# Patient Record
Sex: MALE | Race: WHITE | NOT HISPANIC OR LATINO | Employment: FULL TIME | URBAN - METROPOLITAN AREA
[De-identification: names, ages, dates, MRNs, and addresses within clinical notes are randomized per-mention and may not be internally consistent; named-entity substitution may affect disease eponyms.]

---

## 2017-03-07 ENCOUNTER — OFFICE VISIT (OUTPATIENT)
Dept: MEDICAL GROUP | Age: 57
End: 2017-03-07
Payer: COMMERCIAL

## 2017-03-07 VITALS
OXYGEN SATURATION: 95 % | DIASTOLIC BLOOD PRESSURE: 76 MMHG | WEIGHT: 209 LBS | HEIGHT: 71 IN | BODY MASS INDEX: 29.26 KG/M2 | HEART RATE: 102 BPM | TEMPERATURE: 98.4 F | SYSTOLIC BLOOD PRESSURE: 134 MMHG

## 2017-03-07 DIAGNOSIS — E03.9 HYPOTHYROIDISM, UNSPECIFIED TYPE: ICD-10-CM

## 2017-03-07 DIAGNOSIS — M25.532 LEFT WRIST PAIN: ICD-10-CM

## 2017-03-07 DIAGNOSIS — K58.0 IRRITABLE BOWEL SYNDROME WITH DIARRHEA: ICD-10-CM

## 2017-03-07 DIAGNOSIS — F51.01 PRIMARY INSOMNIA: ICD-10-CM

## 2017-03-07 DIAGNOSIS — J30.1 SEASONAL ALLERGIC RHINITIS DUE TO POLLEN: ICD-10-CM

## 2017-03-07 DIAGNOSIS — K90.0 CELIAC DISEASE: ICD-10-CM

## 2017-03-07 DIAGNOSIS — K21.9 GASTROESOPHAGEAL REFLUX DISEASE WITHOUT ESOPHAGITIS: ICD-10-CM

## 2017-03-07 PROCEDURE — 99204 OFFICE O/P NEW MOD 45 MIN: CPT | Performed by: FAMILY MEDICINE

## 2017-03-07 RX ORDER — PRAVASTATIN SODIUM 40 MG
40 TABLET ORAL NIGHTLY
COMMUNITY
End: 2017-04-03 | Stop reason: SDUPTHER

## 2017-03-07 RX ORDER — UBIDECARENONE 75 MG
100 CAPSULE ORAL DAILY
COMMUNITY
End: 2021-03-23

## 2017-03-07 RX ORDER — FLUTICASONE PROPIONATE 50 MCG
1 SPRAY, SUSPENSION (ML) NASAL DAILY
COMMUNITY
End: 2018-01-29

## 2017-03-07 RX ORDER — FENOFIBRATE 160 MG/1
160 TABLET ORAL DAILY
COMMUNITY
End: 2017-03-29

## 2017-03-07 RX ORDER — SODIUM PHOSPHATE,MONO-DIBASIC 19G-7G/118
500 ENEMA (ML) RECTAL 2 TIMES DAILY WITH MEALS
COMMUNITY

## 2017-03-07 RX ORDER — LEVOTHYROXINE SODIUM 0.1 MG/1
100 TABLET ORAL
COMMUNITY
End: 2017-03-29 | Stop reason: SDUPTHER

## 2017-03-07 RX ORDER — ALBUTEROL SULFATE 90 UG/1
2 AEROSOL, METERED RESPIRATORY (INHALATION) EVERY 6 HOURS PRN
COMMUNITY
End: 2018-01-29

## 2017-03-07 RX ORDER — NAPROXEN 500 MG/1
500 TABLET ORAL 2 TIMES DAILY WITH MEALS
Qty: 28 TAB | Refills: 0 | Status: SHIPPED | OUTPATIENT
Start: 2017-03-07 | End: 2017-03-21

## 2017-03-07 RX ORDER — LORATADINE 10 MG/1
10 CAPSULE, LIQUID FILLED ORAL DAILY
Qty: 60 CAP | Refills: 0 | Status: SHIPPED | OUTPATIENT
Start: 2017-03-07 | End: 2018-01-29

## 2017-03-07 ASSESSMENT — PATIENT HEALTH QUESTIONNAIRE - PHQ9: CLINICAL INTERPRETATION OF PHQ2 SCORE: 1

## 2017-03-07 NOTE — MR AVS SNAPSHOT
"        Rafy Morrison   3/7/2017 3:00 PM   Office Visit   MRN: [id number]    Department:  [address] [place] [address]   Dept Phone:  [phone]    Description:  Male : 1960   Provider:  Mary Carmen Monroy M.D.           Reason for Visit     Establish Care left wrist pain off and on for a couple of years but this past week it has been worse    Cough dry cough x 3 months      Allergies as of 3/7/2017     Allergen Noted Reactions    Nkda [No Known Drug Allergy] 2011         You were diagnosed with     Celiac disease   [579.0.ICD-9-CM]       Seasonal allergic rhinitis due to pollen   [3939554]       Gastroesophageal reflux disease without esophagitis   [091540]       Irritable bowel syndrome with diarrhea   [685685]       Hypothyroidism, unspecified type   [5992107]       Primary insomnia   [799892]       Left wrist pain   [335813]         Vital Signs     Blood Pressure Pulse Temperature Height Weight Body Mass Index    134/76 mmHg 102 36.9 °C (98.4 °F) 1.803 m (5' 11\") 94.802 kg (209 lb) 29.16 kg/m2    Oxygen Saturation Smoking Status                95% Never Smoker           Basic Information     Date Of Birth Sex Race Ethnicity Preferred Language    1960 Male White Non- English      Your appointments     Mar 29, 2017  2:40 PM   Established Patient with Raul Rowell M.D.   [address]    [address] [facility] [address] [phone]           You will be receiving a confirmation call a few days before your appointment from our automated call confirmation system.            May 11, 2017  2:35 PM   NEW TO YOU with GAUTAM Sahu   [facility])    [address] [place] [address], [address]  [facility] [address] [phone]              Problem List              ICD-10-CM Priority Class Noted - Resolved    Celiac disease K90.0   3/7/2017 - Present    Seasonal allergic rhinitis due to pollen J30.1   3/7/2017 - Present   " Gastroesophageal reflux disease without esophagitis K21.9   3/7/2017 - Present    Irritable bowel syndrome with diarrhea K58.0   3/7/2017 - Present    Hypothyroidism E03.9   3/7/2017 - Present    Primary insomnia F51.01   3/7/2017 - Present    Left wrist pain M25.532   3/7/2017 - Present      Health Maintenance        Date Due Completion Dates    IMM DTaP/Tdap/Td Vaccine (1 - Tdap) 5/16/1981 ---    COLONOSCOPY 5/16/2012 ---    IMM INFLUENZA (1) 9/1/2016 9/1/2013            Current Immunizations     Influenza TIV (IM) 9/1/2013      Below and/or attached are the medications your provider expects you to take. Review all of your home medications and newly ordered medications with your provider and/or pharmacist. Follow medication instructions as directed by your provider and/or pharmacist. Please keep your medication list with you and share with your provider. Update the information when medications are discontinued, doses are changed, or new medications (including over-the-counter products) are added; and carry medication information at all times in the event of emergency situations     Allergies:  NKDA - (reactions not documented)               Medications  Valid as of: March 23, 2017 - 12:20 PM    Generic Name Brand Name Tablet Size Instructions for use    Albuterol Sulfate (Aero Soln) albuterol 108 (90 BASE) MCG/ACT Inhale 2 Puffs by mouth every 6 hours as needed for Shortness of Breath.        Ascorbic Acid (Tab) VITAMIN C 500 MG Take 1 Tab by mouth every day.        Cholecalciferol   Take  by mouth.        Cyanocobalamin (Tab) VITAMIN B-12 100 MCG Take 100 mcg by mouth every day.        Fenofibrate (Tab) TRIGLIDE 160 MG Take 160 mg by mouth every day.        Fluticasone Propionate (Suspension) FLONASE 50 MCG/ACT Spray 1 Spray in nose every day.        Glucosamine Sulfate (Cap) glucosamine Sulfate 500 MG Take 500 mg by mouth 3 times a day, with meals.        Inulin   Take  by mouth.        Iron-Vitamin C   Take  by  mouth.        Levothyroxine Sodium (Tab) SYNTHROID 100 MCG Take 100 mcg by mouth Every morning on an empty stomach.        Loratadine (Cap) Loratadine 10 MG Take 10 mg by mouth every day.        Magnesium Citrate (Solution) magnesium citrate  Take 300 mL by mouth Once.        Omeprazole (CAPSULE DELAYED RELEASE) PRILOSEC 20 MG Take 1 Cap by mouth 2 times daily, before breakfast and dinner.        Pravastatin Sodium (Tab) PRAVACHOL 40 MG Take 40 mg by mouth every evening.        TraZODone HCl (Tab) DESYREL 100 MG Take 1 Tab by mouth every bedtime.        .                 Medicines prescribed today were sent to:     [facility] [address]  [place], [place] - [address] AT [facility] [employer]    [address] [place] [address]    Phone: [phone] Fax: [phone]    Open 24 Hours?: No      Medication refill instructions:       If your prescription bottle indicates you have medication refills left, it is not necessary to call your provider’s office. Please contact your pharmacy and they will refill your medication.    If your prescription bottle indicates you do not have any refills left, you may request refills at any time through one of the following ways: The online [employer] system (except Urgent Care), by calling your provider’s office, or by asking your pharmacy to contact your provider’s office with a refill request. Medication refills are processed only during regular business hours and may not be available until the next business day. Your provider may request additional information or to have a follow-up visit with you prior to refilling your medication.   *Please Note: Medication refills are assigned a new Rx number when refilled electronically. Your pharmacy may indicate that no refills were authorized even though a new prescription for the same medication is available at the pharmacy. Please request the medicine by name with the pharmacy before contacting your provider for a refill.                Ideedock Access Code: [email or web address]  Expires: 4/6/2017  4:27 PM    Ideedock  A secure, online tool to manage your health information     [employer]’s Ideedock® is a secure, online tool that connects you to your personalized health information from the privacy of your home -- day or night - making it very easy for you to manage your healthcare. Once the activation process is completed, you can even access your medical information using the Ideedock kyara, which is available for free in the Apple Kyara store or Google Play store.     Ideedock provides the following levels of access (as shown below):   My Chart Features   [facility] Primary Care Doctor [facility]  Specialists [facility]  Urgent  Care Non-[facility]  Primary Care  Doctor   Email your healthcare team securely and privately 24/7 X X X    Manage appointments: schedule your next appointment; view details of past/upcoming appointments X      Request prescription refills. X      View recent personal medical records, including lab and immunizations X X X X   View health record, including health history, allergies, medications X X X X   Read reports about your outpatient visits, procedures, consult and ER notes X X X X   See your discharge summary, which is a recap of your hospital and/or ER visit that includes your diagnosis, lab results, and care plan. X X       How to register for Ideedock:  1. Go to  [email or web address].  2. Click on the Sign Up Now box, which takes you to the New Member Sign Up page. You will need to provide the following information:  a. Enter your Ideedock Access Code exactly as it appears at the top of this page. (You will not need to use this code after you’ve completed the sign-up process. If you do not sign up before the expiration date, you must request a new code.)   b. Enter your date of birth.   c. Enter your home email address.   d. Click Submit, and follow the next screen’s instructions.  3. Create a Ideedock ID. This will be your  [employer] login ID and cannot be changed, so think of one that is secure and easy to remember.  4. Create a FishBrain password. You can change your password at any time.  5. Enter your Password Reset Question and Answer. This can be used at a later time if you forget your password.   6. Enter your e-mail address. This allows you to receive e-mail notifications when new information is available in FishBrain.  7. Click Sign Up. You can now view your health information.    For assistance activating your FishBrain account, call [phone]

## 2017-03-07 NOTE — PROGRESS NOTES
CC: Establish [facility], left wrist pain    HPI:     Rafy Morrison is a 56 y.o. male, new patient to the clinic, presents to [facility]. Pt has the following concerns:     1. Celiac disease  Chronic, diagnosed by blood tests and biopsy, diet controlled. Doing well.    2. Seasonal allergic rhinitis due to pollen  Chronic, worsening the winter, complaining of chronic cough for the past couple months from copious postnasal drip.   Patient is taking Flonase with mild to moderate symptom relief.    3. Gastroesophageal reflux disease without esophagitis  Chronic, has been on PPI for 15 years. Patient attempted to wean off couple times but went into relapses right after.  Denies abdominal pain, weight loss, nausea, hematochezia, melena. He had history of gastric ulcer bleeding ulcer from excessive consumption of NSAID.   Denies any excessive consumption of alcohol, spicy food, acidic food, caffeine. He sleeps on an incline, and usually has dinner at least 4 hours prior to bed time.    4. Irritable bowel syndrome with diarrhea  Chronic, diet controlled, currently asymptomatic.    5. Hypothyroidism, unspecified type  Chronic, stable on levothyroxine 100 µg per day, last thyroid function test was normal in November 2016    6. Primary insomnia  Chronic, responding well to trazodone. Patient is working on weaning off trazodone. He has been taking half a pill. He states that he does not sleep as well but he is doing okay.    7. Left wrist pain  Patient developed acute onset of left wrist pain. Symptom is worse with wrist extension. Nothing makes it better.  Pain is described as achy, non-radiating, 9/10 at its worse. Pain feels like a band around the left wrist.   Patient is right-handed, he used to work in construction and building, he used to use both hands extensively.  He denies elbow or neck pain, fever, chills, left fingers paresthesia.       Current medicines (including changes today)  Current Outpatient Prescriptions    Medication Sig Dispense Refill   • pravastatin (PRAVACHOL) 40 MG tablet Take 40 mg by mouth every evening.     • levothyroxine (SYNTHROID) 100 MCG Tab Take 100 mcg by mouth Every morning on an empty stomach.     • fenofibrate (TRIGLIDE) 160 MG tablet Take 160 mg by mouth every day.     • glucosamine Sulfate 500 MG Cap Take 500 mg by mouth 3 times a day, with meals.     • Inulin (FIBER CHOICE PO) Take  by mouth.     • Iron-Vitamin C (IRON 100/C PO) Take  by mouth.     • cyanocobalamin (VITAMIN B-12) 100 MCG Tab Take 100 mcg by mouth every day.     • VITAMIN D, CHOLECALCIFEROL, PO Take  by mouth.     • magnesium citrate Solution Take 300 mL by mouth Once.     • fluticasone (FLONASE) 50 MCG/ACT nasal spray Spray 1 Spray in nose every day.     • albuterol (PROAIR HFA) 108 (90 BASE) MCG/ACT Aero Soln inhalation aerosol Inhale 2 Puffs by mouth every 6 hours as needed for Shortness of Breath.     • naproxen (NAPROSYN) 500 MG Tab Take 1 Tab by mouth 2 times a day, with meals for 14 days. 28 Tab 0   • Loratadine 10 MG Cap Take 10 mg by mouth every day. 60 Cap 0   • omeprazole (PRILOSEC) 20 MG CPDR Take 1 Cap by mouth 2 times daily, before breakfast and dinner. 30 Cap 0   • trazodone (DESYREL) 100 MG TABS Take 1 Tab by mouth every bedtime. 30 Tab 0   • ascorbic acid (VITAMIN C) 500 MG tablet Take 1 Tab by mouth every day. 30 Tab      No current facility-administered medications for this visit.     He  has a past medical history of Arthritis; Unspecified disorder of thyroid; Indigestion; Pain; Heart burn; and Pain (5/10/12).  He  has past surgical history that includes hernia rep ing bilat (2005); rotator cuff repair (2004); debridement (11/23/2009); nerve ulnar transfer (2001); other orthopedic surgery (1979); hip arthroscopy (11/23/2009); hip arthroscopy (3/9/2011); knee arthroscopy (6/2010); hand surgery (2007); recovery (8/8/2011); hip arthroplasty total (8/15/2011); hip arthroscopy (5/18/2012); acetabular osteotomy  "(2012); synovectomy (2012); tibia plateau orif (2014); and ankle orif (2014).  Social History   Substance Use Topics   • Smoking status: Never Smoker    • Smokeless tobacco: None   • Alcohol Use: No     Social History     Social History Narrative     Family History   Problem Relation Age of Onset   • Diabetes     • Hypertension     • Cancer     • No Known Problems Mother    • Kidney Disease Father      kidney failure on dialysis   • Diabetes Sister      type 1   • GI Sister      celiac disease   • Cancer Paternal Uncle      ?   • Cancer Maternal Grandmother    • No Known Problems Maternal Grandfather    • No Known Problems Paternal Grandmother    • No Known Problems Paternal Grandfather      Family Status   Relation Status Death Age   • Mother     • Father     • Sister Alive    • Maternal Grandmother     • Maternal Grandfather     • Paternal Grandmother     • Paternal Grandfather         I personally reviewed patient's problem list, allergies, medications, family hx, social hx with patient and update EPIC.     REVIEW OF SYSTEMS:  CONSTITUTIONAL:  Denies night sweats, fatigue, malaise, lethargy, fever or chills.  RESPIRATORY:  Denies cough, wheeze, hemoptysis, or shortness of breath.  CARDIOVASCULAR:  Denies chest pains, palpitations, pedal edema  GASTROINTESTINAL:  Denies abdominal pain, nausea or vomiting, diarrhea, constipation, hematemesis, hematochezia, melena.     All other systems reviewed and are negative     Objective:     Blood pressure 134/76, pulse 102, temperature 36.9 °C (98.4 °F), height 1.803 m (5' 11\"), weight 94.802 kg (209 lb), SpO2 95 %. Body mass index is 29.16 kg/(m^2).  Physical Exam:    Constitutional: Awake, alert, in no apparent distress.  Skin: Warm, dry, good turgor, no rashes/jaundice in visible areas.  Eye: intact EOM, conjunctiva clear, lids normal.  ENMT: TM and auditory canal wnl, inflamed nasal with mild-moderate " mucoid discharge. Lips without lesions, good dentition, hyperemic oropharynx without tonsillar hypertrophy. Posterior pharynx has cobblestone appearance.   Neck: Trachea midline, no masses, no thyromegaly. No cervical or supraclavicular lymphadenopathy.  Respiratory: Unlabored respiratory effort, lungs clear to auscultation, no wheezes, no rhonchi.  Cardiovascular: Normal S1, S2, no murmur, no rubs, no gallops, no pedal edema.  Abdomen: Soft, active bowel sounds, non-tender to palpation, no masses, no hepatosplenomegaly.  Neuro: CN 2-12 grossly intact, no focal weakness/numbness.   Psych: Alert and oriented x3, affect and mood wnl, intact judgement and insight.   MSK: left wrist is non-erythematous, non-edematous. Mild-moderate pain with resisted left wrist extension. Active ROM mildly limited due to pain.   Left elbow and neck exam unremarkable.       Assessment and Plan:   The following treatment plan was discussed    1. Celiac disease  Chronic, diet controlled, currently asymptomatic, will monitor    2. Seasonal allergic rhinitis due to pollen  Chronic, worse in the winter, not improving with Flonase. Plan  - continue Flonase  - nasal saline irrigation 3-4 times per day  - Loratadine 10 MG Cap; Take 10 mg by mouth every day.  Dispense: 60 Cap; Refill: 0  - f/u in 2-3 weeks, if not improve, might need Kenalog injection.     3. Gastroesophageal reflux disease without esophagitis  Chronic, stable on Omeprazole 20 mg BID, unable to wean off due to relapses. Denies abdominal pain, weight loss, nausea, hematochezia, melena. He had history of gastric ulcer bleeding ulcer from excessive consumption of NSAID. Denies any excessive consumption of alcohol, spicy food, acidic food, caffeine. He sleeps on an incline, and usually has dinner at least 4 hours prior to bed time.Plan:   - continue Omeprazole 20 mg BID  - weight loss  - continue dietary and behavioral modification.     4. Irritable bowel syndrome with  diarrhea  Chronic, currently asymptomatic, will monitor.     5. Hypothyroidism, unspecified type  Chronic, stable on Levothyroxine 100 mcg qd, last thyroid function test in 11/2016 was normal. Plan:   - continue Levothyroxine 100 mcg qd    6. Primary insomnia  Chronic, well controlled with trazodone, will continue.     7. Left wrist pain  Hx and exam suggest osteoarthritis and/or tendinopathy. Plan :  - naproxen (NAPROSYN) 500 MG Tab; Take 1 Tab by mouth 2 times a day, with meals for 14 days.  Dispense: 28 Tab; Refill: 0  - home wrist exercises provided  - avoid activities that exacerbate the pain, rest, icing PRN  - f/u in 2 weeks.       Records requested.  Followup: Return in about 2 weeks (around 3/21/2017) for Multiple issues, Long.

## 2017-03-29 ENCOUNTER — OFFICE VISIT (OUTPATIENT)
Dept: MEDICAL GROUP | Age: 57
End: 2017-03-29
Payer: COMMERCIAL

## 2017-03-29 VITALS
OXYGEN SATURATION: 96 % | BODY MASS INDEX: 29.34 KG/M2 | TEMPERATURE: 98.1 F | WEIGHT: 209.6 LBS | SYSTOLIC BLOOD PRESSURE: 130 MMHG | DIASTOLIC BLOOD PRESSURE: 82 MMHG | HEIGHT: 71 IN | HEART RATE: 85 BPM

## 2017-03-29 DIAGNOSIS — Z23 NEED FOR VACCINATION: ICD-10-CM

## 2017-03-29 DIAGNOSIS — K90.0 CELIAC DISEASE: ICD-10-CM

## 2017-03-29 DIAGNOSIS — E03.0 CONGENITAL HYPOTHYROIDISM WITH DIFFUSE GOITER: ICD-10-CM

## 2017-03-29 DIAGNOSIS — F51.01 PRIMARY INSOMNIA: ICD-10-CM

## 2017-03-29 DIAGNOSIS — M25.532 LEFT WRIST PAIN: ICD-10-CM

## 2017-03-29 DIAGNOSIS — E78.00 PURE HYPERCHOLESTEROLEMIA: ICD-10-CM

## 2017-03-29 PROCEDURE — 90715 TDAP VACCINE 7 YRS/> IM: CPT | Performed by: FAMILY MEDICINE

## 2017-03-29 PROCEDURE — 99214 OFFICE O/P EST MOD 30 MIN: CPT | Mod: 25 | Performed by: FAMILY MEDICINE

## 2017-03-29 PROCEDURE — 90471 IMMUNIZATION ADMIN: CPT | Performed by: FAMILY MEDICINE

## 2017-03-29 RX ORDER — PRAVASTATIN SODIUM 80 MG/1
80 TABLET ORAL
Qty: 90 TAB | Refills: 0 | Status: SHIPPED | OUTPATIENT
Start: 2017-03-29 | End: 2017-03-29

## 2017-03-29 RX ORDER — LEVOTHYROXINE SODIUM 0.1 MG/1
100 TABLET ORAL
Qty: 90 TAB | Refills: 0 | Status: SHIPPED | OUTPATIENT
Start: 2017-03-29 | End: 2017-06-23 | Stop reason: SDUPTHER

## 2017-03-29 RX ORDER — EZETIMIBE AND SIMVASTATIN 10; 40 MG/1; MG/1
1 TABLET ORAL
Qty: 90 TAB | Refills: 0 | Status: SHIPPED | OUTPATIENT
Start: 2017-03-29 | End: 2017-05-11 | Stop reason: CLARIF

## 2017-03-29 RX ORDER — TRAZODONE HYDROCHLORIDE 150 MG/1
150 TABLET ORAL
Qty: 90 TAB | Refills: 0 | Status: SHIPPED | OUTPATIENT
Start: 2017-03-29 | End: 2017-06-23 | Stop reason: SDUPTHER

## 2017-03-29 NOTE — ASSESSMENT & PLAN NOTE
Patient states that he's been taking trazodone 100 mg by mouth daily at bedtime with minimal benefit. He would like to increase the dose. Denies having any adverse events.

## 2017-03-29 NOTE — Clinical Note
Lynk  GAUTAM Sahu  [address] [facility] [address]  [place] [address]  Fax: [phone]   Authorization for Release/Disclosure of   Protected Health Information   Name: RAFY SMITH : 1960 SSN: [id number]   Address: [address] [place] [address]  [place] [id number] Phone:    [phone] (home)    I authorize the entity listed below to release/disclose the PHI below to:   Lynk/GAUTAM Sahu and Raul Rowell M.D.   Provider or Entity Name:  [facility]   Address   City, State, Zip   Phone:      Fax:     Reason for request: continuity of care   Information to be released:    [ XXXX ] LAST COLONOSCOPY,  including any PATH REPORT and follow-up  [  ] LAST FIT/COLOGUARD RESULT [  ] LAST DEXA  [  ] LAST MAMMOGRAM  [  ] LAST PAP  [  ] LAST LABS [  ] RETINA EXAM REPORT  [  ] IMMUNIZATION RECORDS  [  ] Release all info      [  ] Check here and initial the line next to each item to release ALL health information INCLUDING  _____ Care and treatment for drug and / or alcohol abuse  _____ HIV testing, infection status, or AIDS  _____ Genetic Testing    DATES OF SERVICE OR TIME PERIOD TO BE DISCLOSED: _____________  I understand and acknowledge that:  * This Authorization may be revoked at any time by you in writing, except if your health information has already been used or disclosed.  * Your health information that will be used or disclosed as a result of you signing this authorization could be re-disclosed by the recipient. If this occurs, your re-disclosed health information may no longer be protected by State or Federal laws.  * You may refuse to sign this Authorization. Your refusal will not affect your ability to obtain treatment.  * This Authorization becomes effective upon signing and will  on (date) __________.      If no date is indicated, this Authorization will  one (1) year from the signature date.    Name: Rafy Smith    Signature:   Date:          3/29/2017       PLEASE FAX REQUESTED RECORDS BACK TO: [phone]

## 2017-03-29 NOTE — ASSESSMENT & PLAN NOTE
Patient states that he is here to follow-up on his left wrist pain. It started about 2 weeks ago, he was seen and given activity modification with Naprosyn twice a day. He believes the pain has significantly improved. 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

## 2017-03-29 NOTE — MR AVS SNAPSHOT
"        Rafy Morrison   3/29/2017 2:40 PM   Office Visit   MRN: [id number]    Department:  [address] [place] [address]   Dept Phone:  [phone]    Description:  Male : 1960   Provider:  Raul Rowell M.D.           Allergies as of 3/29/2017     Allergen Noted Reactions    Nkda [No Known Drug Allergy] 2011         You were diagnosed with     Need for vaccination   [316781]       Left wrist pain   [940271]       Pure hypercholesterolemia   [272.0.ICD-9-CM]       Primary insomnia   [104611]       Congenital hypothyroidism with diffuse goiter   [364382]       Celiac disease   [579.0.ICD-9-CM]         Vital Signs     Blood Pressure Pulse Temperature Height Weight Body Mass Index    130/82 mmHg 85 36.7 °C (98.1 °F) 1.803 m (5' 10.98\") 95.074 kg (209 lb 9.6 oz) 29.25 kg/m2    Oxygen Saturation Smoking Status                96% Never Smoker           Basic Information     Date Of Birth Sex Race Ethnicity Preferred Language    1960 Male White Non- English      Your appointments     May 11, 2017  2:35 PM   NEW TO YOU with GAUTAM Sahu   Formerly [facility])    [address] [place] [address], [address]  [facility] [address] [phone]              Problem List              ICD-10-CM Priority Class Noted - Resolved    Celiac disease K90.0   3/7/2017 - Present    Seasonal allergic rhinitis due to pollen J30.1   3/7/2017 - Present    Gastroesophageal reflux disease without esophagitis K21.9   3/7/2017 - Present    Irritable bowel syndrome with diarrhea K58.0   3/7/2017 - Present    Hypothyroidism E03.9   3/7/2017 - Present    Primary insomnia F51.01   3/7/2017 - Present    Left wrist pain M25.532   3/7/2017 - Present    Pure hypercholesterolemia E78.00   3/29/2017 - Present      Health Maintenance        Date Due Completion Dates    IMM DTaP/Tdap/Td Vaccine (1 - Tdap) 1981 ---    COLONOSCOPY 2012 ---    IMM INFLUENZA (1) 2016            "   Current Immunizations     Influenza TIV (IM) 9/1/2013    Tdap Vaccine 3/29/2017      Below and/or attached are the medications your provider expects you to take. Review all of your home medications and newly ordered medications with your provider and/or pharmacist. Follow medication instructions as directed by your provider and/or pharmacist. Please keep your medication list with you and share with your provider. Update the information when medications are discontinued, doses are changed, or new medications (including over-the-counter products) are added; and carry medication information at all times in the event of emergency situations     Allergies:  NKDA - (reactions not documented)               Medications  Valid as of: March 29, 2017 -  3:44 PM    Generic Name Brand Name Tablet Size Instructions for use    Albuterol Sulfate (Aero Soln) albuterol 108 (90 BASE) MCG/ACT Inhale 2 Puffs by mouth every 6 hours as needed for Shortness of Breath.        Ascorbic Acid (Tab) VITAMIN C 500 MG Take 1 Tab by mouth every day.        Cholecalciferol   Take  by mouth.        Cyanocobalamin (Tab) VITAMIN B-12 100 MCG Take 100 mcg by mouth every day.        Ezetimibe-Simvastatin (Tab) VYTORIN 10-40 MG Take 1 Tab by mouth every bedtime.        Fluticasone Propionate (Suspension) FLONASE 50 MCG/ACT Spray 1 Spray in nose every day.        Glucosamine Sulfate (Cap) glucosamine Sulfate 500 MG Take 500 mg by mouth 3 times a day, with meals.        Inulin   Take  by mouth.        Iron-Vitamin C   Take  by mouth.        Levothyroxine Sodium (Tab) SYNTHROID 100 MCG Take 1 Tab by mouth Every morning on an empty stomach.        Loratadine (Cap) Loratadine 10 MG Take 10 mg by mouth every day.        Magnesium Citrate (Solution) magnesium citrate  Take 300 mL by mouth Once.        Omeprazole (CAPSULE DELAYED RELEASE) PRILOSEC 20 MG Take 1 Cap by mouth 2 times daily, before breakfast and dinner.        TraZODone HCl (Tab) DESYREL 150 MG  Take 1 Tab by mouth every bedtime.        .                 Medicines prescribed today were sent to:     [id number] - [place], [place] - [address] AT [email or web address] [address]    [address] [place] [address]    Phone: [phone] Fax: [phone]    Open 24 Hours?: No      Medication refill instructions:       If your prescription bottle indicates you have medication refills left, it is not necessary to call your provider’s office. Please contact your pharmacy and they will refill your medication.    If your prescription bottle indicates you do not have any refills left, you may request refills at any time through one of the following ways: The online [employer] system (except Urgent Care), by calling your provider’s office, or by asking your pharmacy to contact your provider’s office with a refill request. Medication refills are processed only during regular business hours and may not be available until the next business day. Your provider may request additional information or to have a follow-up visit with you prior to refilling your medication.   *Please Note: Medication refills are assigned a new Rx number when refilled electronically. Your pharmacy may indicate that no refills were authorized even though a new prescription for the same medication is available at the pharmacy. Please request the medicine by name with the pharmacy before contacting your provider for a refill.           [employer] Access Code: [email or web address]  Expires: 4/6/2017  4:27 PM    [employer]  A secure, online tool to manage your health information     [employer]’s [employer]® is a secure, online tool that connects you to your personalized health information from the privacy of your home -- day or night - making it very easy for you to manage your healthcare. Once the activation process is completed, you can even access your medical information using the [employer] kyara, which is available for free in the Apple Kyara store or  Google Play store.     Mimub provides the following levels of access (as shown below):   My Chart Features   [facility] Doctor [facility]  Specialists [facility]  Urgent  Care Non-[facility]  Primary Care  Doctor   Email your healthcare team securely and privately 24/7 X X X    Manage appointments: schedule your next appointment; view details of past/upcoming appointments X      Request prescription refills. X      View recent personal medical records, including lab and immunizations X X X X   View health record, including health history, allergies, medications X X X X   Read reports about your outpatient visits, procedures, consult and ER notes X X X X   See your discharge summary, which is a recap of your hospital and/or ER visit that includes your diagnosis, lab results, and care plan. X X       How to register for [employer]:  1. Go to  [email or web address].  2. Click on the Sign Up Now box, which takes you to the New Member Sign Up page. You will need to provide the following information:  a. Enter your Mimub Access Code exactly as it appears at the top of this page. (You will not need to use this code after you’ve completed the sign-up process. If you do not sign up before the expiration date, you must request a new code.)   b. Enter your date of birth.   c. Enter your home email address.   d. Click Submit, and follow the next screen’s instructions.  3. Create a Mimub ID. This will be your Mimub login ID and cannot be changed, so think of one that is secure and easy to remember.  4. Create a Mimub password. You can change your password at any time.  5. Enter your Password Reset Question and Answer. This can be used at a later time if you forget your password.   6. Enter your e-mail address. This allows you to receive e-mail notifications when new information is available in Mimub.  7. Click Sign Up. You can now view your health information.    For assistance activating your Mimub account, call  [phone]

## 2017-03-29 NOTE — ASSESSMENT & PLAN NOTE
Patient has been taking fenofibrate 160 mg by mouth daily, as well as pravastatin 40 mg by mouth daily at bedtime. However his cholesterol still is not at goal.    Results for HARDY SMITH (MRN [id number]) as of 3/29/2017 15:14   Ref. Range 11/23/2016 06:15   Cholesterol,Tot Latest Ref Range: 100-199 mg/dL 181   Triglycerides Latest Ref Range: 0-149 mg/dL 268 (H)   HDL Latest Ref Range: >=40 mg/dL 33 (A)   LDL Latest Ref Range: <100 mg/dL 94

## 2017-03-29 NOTE — PROGRESS NOTES
This medical record contains text that has been entered with the assistance of computer voice recognition and dictation software.  Therefore, it may contain unintended errors in text, spelling, punctuation, or grammar    No chief complaint on file.      Rafy Smith is a 56 y.o. male here evaluation and management of: Follow-up on wrist pain, hyperlipidemia, insomnia      HPI:     Left wrist pain  Patient states that he is here to follow-up on his left wrist pain. It started about 2 weeks ago, he was seen and given activity modification with Naprosyn twice a day. He believes the pain has significantly improved. 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    Primary insomnia  Patient states that he's been taking trazodone 100 mg by mouth daily at bedtime with minimal benefit. He would like to increase the dose. Denies having any adverse events.    Pure hypercholesterolemia  Patient has been taking fenofibrate 160 mg by mouth daily, as well as pravastatin 40 mg by mouth daily at bedtime. However his cholesterol still is not at goal.    Results for RAFY SMITH (MRN [id number]) as of 3/29/2017 15:14   Ref. Range 11/23/2016 06:15   Cholesterol,Tot Latest Ref Range: 100-199 mg/dL 181   Triglycerides Latest Ref Range: 0-149 mg/dL 268 (H)   HDL Latest Ref Range: >=40 mg/dL 33 (A)   LDL Latest Ref Range: <100 mg/dL 94         Current medicines (including changes today)  Current Outpatient Prescriptions   Medication Sig Dispense Refill   • trazodone (DESYREL) 150 MG Tab Take 1 Tab by mouth every bedtime. 90 Tab 0   • levothyroxine (SYNTHROID) 100 MCG Tab Take 1 Tab by mouth Every morning on an empty stomach. 90 Tab 0   • ezetimibe-simvastatin (VYTORIN) 10-40 MG per tablet Take 1 Tab by mouth every bedtime. 90 Tab 0   • glucosamine Sulfate 500 MG Cap Take 500 mg by mouth 3 times a day, with meals.     •  Iron-Vitamin C (IRON 100/C PO) Take  by mouth.     • cyanocobalamin (VITAMIN B-12) 100 MCG Tab Take 100 mcg by mouth every day.     • VITAMIN D, CHOLECALCIFEROL, PO Take  by mouth.     • magnesium citrate Solution Take 300 mL by mouth Once.     • fluticasone (FLONASE) 50 MCG/ACT nasal spray Spray 1 Spray in nose every day.     • albuterol (PROAIR HFA) 108 (90 BASE) MCG/ACT Aero Soln inhalation aerosol Inhale 2 Puffs by mouth every 6 hours as needed for Shortness of Breath.     • Loratadine 10 MG Cap Take 10 mg by mouth every day. 60 Cap 0   • omeprazole (PRILOSEC) 20 MG CPDR Take 1 Cap by mouth 2 times daily, before breakfast and dinner. 30 Cap 0   • ascorbic acid (VITAMIN C) 500 MG tablet Take 1 Tab by mouth every day. 30 Tab    • Inulin (FIBER CHOICE PO) Take  by mouth.       No current facility-administered medications for this visit.     He  has a past medical history of Arthritis; Unspecified disorder of thyroid; Indigestion; Pain; Heart burn; and Pain (5/10/12).  He  has past surgical history that includes hernia rep ing bilat (2005); rotator cuff repair (2004); debridement (11/23/2009); nerve ulnar transfer (2001); other orthopedic surgery (1979); hip arthroscopy (11/23/2009); hip arthroscopy (3/9/2011); knee arthroscopy (6/2010); hand surgery (2007); recovery (8/8/2011); hip arthroplasty total (8/15/2011); hip arthroscopy (5/18/2012); acetabular osteotomy (5/18/2012); synovectomy (5/18/2012); tibia plateau orif (5/24/2014); and ankle orif (5/27/2014).  Social History   Substance Use Topics   • Smoking status: Never Smoker    • Smokeless tobacco: Never Used   • Alcohol Use: No     Social History     Social History Narrative     Family History   Problem Relation Age of Onset   • Diabetes     • Hypertension     • Cancer     • No Known Problems Mother    • Kidney Disease Father      kidney failure on dialysis   • Diabetes Sister      type 1   • GI Sister      celiac disease   • Cancer Paternal Uncle      ?   •  "Cancer Maternal Grandmother    • No Known Problems Maternal Grandfather    • No Known Problems Paternal Grandmother    • No Known Problems Paternal Grandfather      Family Status   Relation Status Death Age   • Mother     • Father     • Sister Alive    • Maternal Grandmother     • Maternal Grandfather     • Paternal Grandmother     • Paternal Grandfather           ROS  Please see history of present illness    All other systems reviewed and are negative     Objective:     Blood pressure 130/82, pulse 85, temperature 36.7 °C (98.1 °F), height 1.803 m (5' 10.98\"), weight 95.074 kg (209 lb 9.6 oz), SpO2 96 %. Body mass index is 29.25 kg/(m^2).  Physical Exam:    Constitutional: Alert, no distress.  Skin: Warm, dry, good turgor, no rashes in visible areas.  Eye: Equal, round and reactive, conjunctiva clear, lids normal.  ENMT: Lips without lesions, good dentition, oropharynx clear.  Neck: Trachea midline, no masses, no thyromegaly. No cervical or supraclavicular lymphadenopathy.  Respiratory: Unlabored respiratory effort, lungs clear to auscultation, no wheezes, no ronchi.  Cardiovascular: Normal S1, S2, no murmur, no edema.  Abdomen: Soft, non-tender, no masses, no hepatosplenomegaly.  Psych: Alert and oriented x3, normal affect and mood.  MSK--negative Tinel sign, negative Phalen, negative Finkelstein, normal function normal extension of the wrist, normal         Assessment and Plan:   The following treatment plan was discussed, again this medical record contains text that has been entered with the assistance of computer voice recognition and dictation software.  Therefore, it may contain unintended errors in text, spelling, punctuation, or grammar      1. Need for vaccination  Given today    - TDAP VACCINE =>6YO IM    2. Left wrist pain  Considerably improved    3. Pure hypercholesterolemia  Patient would not mind having one pill as opposed to 2  So we will start " Vytorin and repeat labs in 3 months  Stop pravastatin, stop fenofibrate    - ezetimibe-simvastatin (VYTORIN) 10-40 MG per tablet; Take 1 Tab by mouth every bedtime.  Dispense: 90 Tab; Refill: 0    4. Primary insomnia  We will try a higher dose to see if it improves his insomnia    - trazodone (DESYREL) 150 MG Tab; Take 1 Tab by mouth every bedtime.  Dispense: 90 Tab; Refill: 0    5. Congenital hypothyroidism with diffuse goiter  Patient has been stable with current management  We will make no changes for now    - levothyroxine (SYNTHROID) 100 MCG Tab; Take 1 Tab by mouth Every morning on an empty stomach.  Dispense: 90 Tab; Refill: 0    6. Celiac disease  Patient has been stable with current management  We will make no changes for now          Followup: Return in about 3 months (around 6/29/2017) for Reevaluation.

## 2017-03-31 ENCOUNTER — TELEPHONE (OUTPATIENT)
Dept: MEDICAL GROUP | Age: 57
End: 2017-03-31

## 2017-03-31 DIAGNOSIS — E78.00 PURE HYPERCHOLESTEROLEMIA: ICD-10-CM

## 2017-03-31 NOTE — TELEPHONE ENCOUNTER
1. Caller Name: Patient                                       Call Back Number: [phone] (home)       Patient approves a detailed voicemail message: yes    Patient is calling because insurance will not cover the Vytorin as it is written, it needs to be re-written for the generic. Please advise.

## 2017-04-03 RX ORDER — PRAVASTATIN SODIUM 40 MG
40 TABLET ORAL
Qty: 90 TAB | Refills: 0 | Status: SHIPPED | OUTPATIENT
Start: 2017-04-03 | End: 2018-04-12

## 2017-04-03 RX ORDER — FENOFIBRATE 145 MG/1
145 TABLET, COATED ORAL DAILY
Qty: 90 TAB | Refills: 1 | Status: SHIPPED | OUTPATIENT
Start: 2017-04-03 | End: 2018-08-10

## 2017-05-11 ENCOUNTER — OFFICE VISIT (OUTPATIENT)
Dept: MEDICAL GROUP | Facility: PHYSICIAN GROUP | Age: 57
End: 2017-05-11
Payer: COMMERCIAL

## 2017-05-11 VITALS
DIASTOLIC BLOOD PRESSURE: 72 MMHG | BODY MASS INDEX: 28.84 KG/M2 | OXYGEN SATURATION: 94 % | WEIGHT: 206 LBS | RESPIRATION RATE: 16 BRPM | HEART RATE: 80 BPM | TEMPERATURE: 98.2 F | HEIGHT: 71 IN | SYSTOLIC BLOOD PRESSURE: 118 MMHG

## 2017-05-11 DIAGNOSIS — K21.9 GASTROESOPHAGEAL REFLUX DISEASE WITHOUT ESOPHAGITIS: ICD-10-CM

## 2017-05-11 DIAGNOSIS — E03.9 HYPOTHYROIDISM, ACQUIRED: ICD-10-CM

## 2017-05-11 DIAGNOSIS — J30.1 SEASONAL ALLERGIC RHINITIS DUE TO POLLEN: ICD-10-CM

## 2017-05-11 DIAGNOSIS — Z76.89 ENCOUNTER TO ESTABLISH CARE: ICD-10-CM

## 2017-05-11 DIAGNOSIS — K90.0 CELIAC DISEASE: ICD-10-CM

## 2017-05-11 DIAGNOSIS — F51.01 PRIMARY INSOMNIA: ICD-10-CM

## 2017-05-11 DIAGNOSIS — E78.00 PURE HYPERCHOLESTEROLEMIA: ICD-10-CM

## 2017-05-11 PROCEDURE — 99215 OFFICE O/P EST HI 40 MIN: CPT | Performed by: NURSE PRACTITIONER

## 2017-05-11 NOTE — ASSESSMENT & PLAN NOTE
Managed with pravastatin 40 mg and fenofibrate 145 mg until insurance changed, not covered under current plan, neither was new RX for Vytorin.

## 2017-05-11 NOTE — MR AVS SNAPSHOT
"        Rafy Morrison   2017 2:35 PM   Office Visit   MRN: [id number]    Department:  [facility]   Dept Phone:  [phone]    Description:  Male : 1960   Provider:  GAUTAM Sahu           Reason for Visit     Establish Care NU2U prior PCP Dr. [facility] & Dr. Monroy     Foot Problem (L) foot injury x3 years still causing pain      Allergies as of 2017     Allergen Noted Reactions    Nkda [No Known Drug Allergy] 2011         You were diagnosed with     Pure hypercholesterolemia   [272.0.ICD-9-CM]       Seasonal allergic rhinitis due to pollen   [5260907]       Hypothyroidism, acquired   [658804]       Gastroesophageal reflux disease without esophagitis   [410367]       Celiac disease   [579.0.ICD-9-CM]       Primary insomnia   [144737]       Encounter to establish care   [436001]         Vital Signs     Blood Pressure Pulse Temperature Respirations Height Weight    118/72 mmHg 80 36.8 °C (98.2 °F) 16 1.803 m (5' 10.98\") 93.441 kg (206 lb)    Body Mass Index Oxygen Saturation Smoking Status             28.74 kg/m2 94% Never Smoker          Basic Information     Date Of Birth Sex Race Ethnicity Preferred Language    1960 Male White Non- English      Problem List              ICD-10-CM Priority Class Noted - Resolved    Celiac disease K90.0   3/7/2017 - Present    Seasonal allergic rhinitis due to pollen J30.1   3/7/2017 - Present    Gastroesophageal reflux disease without esophagitis K21.9   3/7/2017 - Present    Irritable bowel syndrome with diarrhea K58.0   3/7/2017 - Present    Hypothyroidism, acquired E03.9   3/7/2017 - Present    Primary insomnia F51.01   3/7/2017 - Present    Left wrist pain M25.532   3/7/2017 - Present    Pure hypercholesterolemia E78.00   3/29/2017 - Present      Health Maintenance        Date Due Completion Dates    COLONOSCOPY 2012 ---    IMM DTaP/Tdap/Td Vaccine (2 - Td) 3/29/2027 3/29/2017            Current " Immunizations     Influenza TIV (IM) 9/1/2013    Tdap Vaccine 3/29/2017      Below and/or attached are the medications your provider expects you to take. Review all of your home medications and newly ordered medications with your provider and/or pharmacist. Follow medication instructions as directed by your provider and/or pharmacist. Please keep your medication list with you and share with your provider. Update the information when medications are discontinued, doses are changed, or new medications (including over-the-counter products) are added; and carry medication information at all times in the event of emergency situations     Allergies:  NKDA - (reactions not documented)               Medications  Valid as of: May 11, 2017 -  3:23 PM    Generic Name Brand Name Tablet Size Instructions for use    Albuterol Sulfate (Aero Soln) albuterol 108 (90 BASE) MCG/ACT Inhale 2 Puffs by mouth every 6 hours as needed for Shortness of Breath.        Ascorbic Acid (Tab) VITAMIN C 500 MG Take 1 Tab by mouth every day.        Cholecalciferol   Take  by mouth.        Cyanocobalamin (Tab) VITAMIN B-12 100 MCG Take 100 mcg by mouth every day.        Fenofibrate (Tab) TRICOR 145 MG Take 1 Tab by mouth every day.        Fluticasone Propionate (Suspension) FLONASE 50 MCG/ACT Spray 1 Spray in nose every day.        Glucosamine Sulfate (Cap) glucosamine Sulfate 500 MG Take 500 mg by mouth 3 times a day, with meals.        Inulin   Take  by mouth.        Iron-Vitamin C   Take  by mouth.        Levothyroxine Sodium (Tab) SYNTHROID 100 MCG Take 1 Tab by mouth Every morning on an empty stomach.        Loratadine (Cap) Loratadine 10 MG Take 10 mg by mouth every day.        Magnesium Citrate (Solution) magnesium citrate  Take 300 mL by mouth Once.        Omeprazole (CAPSULE DELAYED RELEASE) PRILOSEC 20 MG Take 1 Cap by mouth 2 times daily, before breakfast and dinner.        Pravastatin Sodium (Tab) PRAVACHOL 40 MG Take 1 Tab by mouth every  bedtime.        TraZODone HCl (Tab) DESYREL 150 MG Take 1 Tab by mouth every bedtime.        .                 Medicines prescribed today were sent to:     [facility] [address] [place] [address] [place], [place] - [address] [facility] [address]    [facility] [address] [place] [address]    Phone: [phone] Fax: [phone]    Open 24 Hours?: No      Medication refill instructions:       If your prescription bottle indicates you have medication refills left, it is not necessary to call your provider’s office. Please contact your pharmacy and they will refill your medication.    If your prescription bottle indicates you do not have any refills left, you may request refills at any time through one of the following ways: The online [employer] system (except Urgent Care), by calling your provider’s office, or by asking your pharmacy to contact your provider’s office with a refill request. Medication refills are processed only during regular business hours and may not be available until the next business day. Your provider may request additional information or to have a follow-up visit with you prior to refilling your medication.   *Please Note: Medication refills are assigned a new Rx number when refilled electronically. Your pharmacy may indicate that no refills were authorized even though a new prescription for the same medication is available at the pharmacy. Please request the medicine by name with the pharmacy before contacting your provider for a refill.        Your To Do List     Future Labs/Procedures Complete By Expires    COMP METABOLIC PANEL  As directed 5/12/2018    Comments:    8 hour fast, plain water only    FREE THYROXINE  As directed 5/11/2018    LIPID PROFILE  As directed 5/12/2018    Comments:    10 hour fast, plain water only    MAGNESIUM  As directed 5/11/2018    TSH  As directed 5/12/2018         [employer] Access Code: Activation code not generated  Current [employer] Status: Active

## 2017-05-11 NOTE — PROGRESS NOTES
Chief Complaint   Patient presents with   • Establish Care     NU2U prior PCP Dr. Rukhsana Gonzalez [facility] & Dr. Monroy    • Foot Problem     (L) foot injury x3 years still causing pain     Rafy Morrison is a 56 y.o. male here to establish care and for evaluation and management of the following:  HPI:    Previous PCP Rukhsana Gonzalez MD    Left foot work related injury - advised to follow up with surgeon.      Pure hypercholesterolemia  Managed with pravastatin 40 mg and fenofibrate 145 mg until insurance changed, not covered under current plan, neither was new RX for Vytorin.      Seasonal allergic rhinitis due to pollen  Managed with OTC antihistamine and Flonase.     Hypothyroidism, acquired  Managed with levothyroxine 100 mcg.    Gastroesophageal reflux disease without esophagitis  Managed with omeprazole 20 mg bid.    Celiac disease  Initially diagnosed by lab results, confirmed on colonoscopy about 5 years ago. Not following gluten free diet.      Primary insomnia  Managed with trazodone 100 mg every night. Long history of sleep problem.      Current medicines (including changes today)  Current Outpatient Prescriptions   Medication Sig Dispense Refill   • fenofibrate (TRICOR) 145 MG Tab Take 1 Tab by mouth every day. 90 Tab 1   • pravastatin (PRAVACHOL) 40 MG tablet Take 1 Tab by mouth every bedtime. 90 Tab 0   • trazodone (DESYREL) 150 MG Tab Take 1 Tab by mouth every bedtime. 90 Tab 0   • levothyroxine (SYNTHROID) 100 MCG Tab Take 1 Tab by mouth Every morning on an empty stomach. 90 Tab 0   • glucosamine Sulfate 500 MG Cap Take 500 mg by mouth 3 times a day, with meals.     • Inulin (FIBER CHOICE PO) Take  by mouth.     • Iron-Vitamin C (IRON 100/C PO) Take  by mouth.     • cyanocobalamin (VITAMIN B-12) 100 MCG Tab Take 100 mcg by mouth every day.     • VITAMIN D, CHOLECALCIFEROL, PO Take  by mouth.     • magnesium citrate Solution Take 300 mL by mouth Once.     • fluticasone (FLONASE) 50 MCG/ACT nasal spray  Spray 1 Spray in nose every day.     • albuterol (PROAIR HFA) 108 (90 BASE) MCG/ACT Aero Soln inhalation aerosol Inhale 2 Puffs by mouth every 6 hours as needed for Shortness of Breath.     • omeprazole (PRILOSEC) 20 MG CPDR Take 1 Cap by mouth 2 times daily, before breakfast and dinner. 30 Cap 0   • ascorbic acid (VITAMIN C) 500 MG tablet Take 1 Tab by mouth every day. 30 Tab    • Loratadine 10 MG Cap Take 10 mg by mouth every day. 60 Cap 0     No current facility-administered medications for this visit.     He  has a past medical history of Arthritis; Unspecified disorder of thyroid; Indigestion; Pain; Heart burn; and Pain (5/10/12).  He  has past surgical history that includes hernia rep ing bilat (2005); rotator cuff repair (2004); debridement (11/23/2009); nerve ulnar transfer (2001); other orthopedic surgery (1979); hip arthroscopy (11/23/2009); hip arthroscopy (3/9/2011); knee arthroscopy (6/2010); hand surgery (2007); recovery (8/8/2011); hip arthroplasty total (8/15/2011); hip arthroscopy (5/18/2012); acetabular osteotomy (5/18/2012); synovectomy (5/18/2012); tibia plateau orif (5/24/2014); and ankle orif (5/27/2014).  Social History     Social History   • Marital Status:      Spouse Name: N/A   • Number of Children: 3   • Years of Education: N/A     Social History Main Topics   • Smoking status: Never Smoker    • Smokeless tobacco: Never Used   • Alcohol Use: No   • Drug Use: No   • Sexual Activity:     Partners: Female     Birth Control/ Protection: Surgical      Comment: wife has hysterectomy     Other Topics Concern   • None     Social History Narrative     Family History   Problem Relation Age of Onset   • Diabetes     • Hypertension     • Cancer     • No Known Problems Mother    • Kidney Disease Father      kidney failure on dialysis   • Diabetes Sister      type 1   • GI Sister      celiac disease   • Cancer Paternal Uncle      ?   • Cancer Maternal Grandmother    • No Known Problems Maternal  "Grandfather    • No Known Problems Paternal Grandmother    • No Known Problems Paternal Grandfather      Family Status   Relation Status Death Age   • Mother  82     old age   • Father  73     kidney failure   • Sister Alive    • Maternal Grandmother     • Maternal Grandfather     • Paternal Grandmother     • Paternal Grandfather     • Brother Alive    • Daughter Alive    • Daughter Alive    • Son Alive        Health maintenance reviewed and updated.     ROS  Constitutional: Negative for fever, chills, and unexplained weight loss.   HENT: Negative for ear pain, nosebleeds, and sore throat.  Eyes: Negative for blurred vision.   Respiratory: Negative for cough, sputum production, and wheezing.   Cardiovascular: Negative for chest pain, palpitations.   Gastrointestinal: Negative for nausea, vomiting, abdominal pain, constipation, diarrhea.   Genitourinary: Negative for dysuria, urgency, and frequency.   Musculoskeletal: Negative for myalgias and + joint pain.   Skin: Negative for rash and itching.   Neurological: Negative for dizziness, tremors, focal weakness.   Endo/Heme/Allergies: Does not bruise/bleed easily.   All other systems reviewed and are negative except as in HPI.     Objective:   Blood pressure 118/72, pulse 80, temperature 36.8 °C (98.2 °F), resp. rate 16, height 1.803 m (5' 10.98\"), weight 93.441 kg (206 lb), SpO2 94 %. Body mass index is 28.74 kg/(m^2).  Physical Exam:  Constitutional: Alert, oriented, in no acute distress.  Pleasant and cooperative with the examination.  Psych: Eye contact is good, speech goal directed, affect calm, normal judgement and insight.  Skin: Warm, dry, no rashes in visible areas.  HEENT: PERRL, conjunctiva and sclera clear.  Nares patent with no significant congestion or drainage.  Normal pinnae and external auditory canals. TM intact.  Oral mucous membranes pink and moist. Oropharynx clear.  Neck: Supple, trachea midline. "   Lungs: Normal effort and respirations. Clear to auscultation bilaterally.  CV: Regular rate and rhythm.  Abdomen: Soft, non-tender, no palpable masses. No CVAT  Lower extremities: no edema.  Neurological:  Grossly intact.    Pulses:  Intact    Assessment and Plan:   The following treatment plan was discussed    1. Pure hypercholesterolemia  Not currently treated, reviewed lifestyle modifications and medication options. He will contact insurance regarding available options. Will continue to monitor.    2. Seasonal allergic rhinitis due to pollen  Stable. Continue current medicines. Monitor labs regularly.        3. Hypothyroidism, acquired  Stable. Continue current medicines. Monitor labs regularly.        4. Gastroesophageal reflux disease without esophagitis  Stable. Continue current medicines. Monitor labs regularly.        5. Celiac disease  Counseled regarding importance of gluten free diet. Records requested.    6. Insomnia  Continue trazodone. Stable.         7. Encounter to establish care    He will follow up with surgeon regarding left foot pain.    Records requested. Will be reviewed upon receipt.   Followup: Return in about 3 months (around 8/11/2017).  Sooner if needed or with any new problems.       Patient was seen for 40 minutes, more than 50% of time spent in face to face review, consultation, counseling, and arranging future evaluation and follow up of medical conditions and care.     Take all medications as directed.  Report any side effects of medications.   Report any new symptoms.  Follow up as advised.  Have labs or other studies as ordered done as directed prior to follow up.  Please keep all appointments for any referrals given.    Please note this dictation was created using voice recognition software. Every reasonable attempt has been made to correct obvious errors, however there may be errors of grammar and possibly content that were not discovered before finalizing the note.

## 2017-05-11 NOTE — ASSESSMENT & PLAN NOTE
Initially diagnosed by lab results, confirmed on colonoscopy about 5 years ago. Not following gluten free diet.

## 2017-05-13 ENCOUNTER — TELEPHONE (OUTPATIENT)
Dept: MEDICAL GROUP | Facility: PHYSICIAN GROUP | Age: 57
End: 2017-05-13

## 2017-06-23 DIAGNOSIS — E03.9 HYPOTHYROIDISM, ACQUIRED: ICD-10-CM

## 2017-06-23 DIAGNOSIS — F51.01 PRIMARY INSOMNIA: ICD-10-CM

## 2017-06-23 RX ORDER — TRAZODONE HYDROCHLORIDE 150 MG/1
TABLET ORAL
Qty: 90 TAB | Refills: 0 | Status: SHIPPED | OUTPATIENT
Start: 2017-06-23 | End: 2017-09-18 | Stop reason: SDUPTHER

## 2017-06-23 RX ORDER — LEVOTHYROXINE SODIUM 0.1 MG/1
TABLET ORAL
Qty: 90 TAB | Refills: 0 | Status: SHIPPED | OUTPATIENT
Start: 2017-06-23 | End: 2017-09-18 | Stop reason: SDUPTHER

## 2017-06-24 ENCOUNTER — HOSPITAL ENCOUNTER (OUTPATIENT)
Dept: LAB | Facility: MEDICAL CENTER | Age: 57
End: 2017-06-24
Attending: NURSE PRACTITIONER
Payer: COMMERCIAL

## 2017-06-24 DIAGNOSIS — E78.00 PURE HYPERCHOLESTEROLEMIA: ICD-10-CM

## 2017-06-24 DIAGNOSIS — E03.9 HYPOTHYROIDISM, ACQUIRED: ICD-10-CM

## 2017-06-24 DIAGNOSIS — K21.9 GASTROESOPHAGEAL REFLUX DISEASE WITHOUT ESOPHAGITIS: ICD-10-CM

## 2017-06-24 LAB
ALBUMIN SERPL BCP-MCNC: 4.5 G/DL (ref 3.2–4.9)
ALBUMIN/GLOB SERPL: 1.8 G/DL
ALP SERPL-CCNC: 44 U/L (ref 30–99)
ALT SERPL-CCNC: 24 U/L (ref 2–50)
ANION GAP SERPL CALC-SCNC: 8 MMOL/L (ref 0–11.9)
AST SERPL-CCNC: 20 U/L (ref 12–45)
BILIRUB SERPL-MCNC: 0.4 MG/DL (ref 0.1–1.5)
BUN SERPL-MCNC: 15 MG/DL (ref 8–22)
CALCIUM SERPL-MCNC: 9.2 MG/DL (ref 8.5–10.5)
CHLORIDE SERPL-SCNC: 109 MMOL/L (ref 96–112)
CHOLEST SERPL-MCNC: 157 MG/DL (ref 100–199)
CO2 SERPL-SCNC: 24 MMOL/L (ref 20–33)
CREAT SERPL-MCNC: 1.07 MG/DL (ref 0.5–1.4)
GFR SERPL CREATININE-BSD FRML MDRD: >60 ML/MIN/1.73 M 2
GLOBULIN SER CALC-MCNC: 2.5 G/DL (ref 1.9–3.5)
GLUCOSE SERPL-MCNC: 95 MG/DL (ref 65–99)
HDLC SERPL-MCNC: 33 MG/DL
LDLC SERPL CALC-MCNC: 69 MG/DL
MAGNESIUM SERPL-MCNC: 2 MG/DL (ref 1.5–2.5)
POTASSIUM SERPL-SCNC: 4.2 MMOL/L (ref 3.6–5.5)
PROT SERPL-MCNC: 7 G/DL (ref 6–8.2)
SODIUM SERPL-SCNC: 141 MMOL/L (ref 135–145)
T4 FREE SERPL-MCNC: 0.88 NG/DL (ref 0.53–1.43)
TRIGL SERPL-MCNC: 277 MG/DL (ref 0–149)
TSH SERPL DL<=0.005 MIU/L-ACNC: 0.57 UIU/ML (ref 0.3–3.7)

## 2017-06-24 PROCEDURE — 80061 LIPID PANEL: CPT

## 2017-06-24 PROCEDURE — 84439 ASSAY OF FREE THYROXINE: CPT

## 2017-06-24 PROCEDURE — 84443 ASSAY THYROID STIM HORMONE: CPT

## 2017-06-24 PROCEDURE — 80053 COMPREHEN METABOLIC PANEL: CPT

## 2017-06-24 PROCEDURE — 83735 ASSAY OF MAGNESIUM: CPT

## 2017-06-24 PROCEDURE — 36415 COLL VENOUS BLD VENIPUNCTURE: CPT

## 2017-09-18 DIAGNOSIS — E03.9 HYPOTHYROIDISM, ACQUIRED: ICD-10-CM

## 2017-09-18 DIAGNOSIS — F51.01 PRIMARY INSOMNIA: ICD-10-CM

## 2017-09-19 ENCOUNTER — TELEPHONE (OUTPATIENT)
Dept: MEDICAL GROUP | Age: 57
End: 2017-09-19

## 2017-09-19 DIAGNOSIS — F51.01 PRIMARY INSOMNIA: ICD-10-CM

## 2017-09-19 RX ORDER — LEVOTHYROXINE SODIUM 0.1 MG/1
TABLET ORAL
Qty: 90 TAB | Refills: 0 | Status: SHIPPED | OUTPATIENT
Start: 2017-09-19 | End: 2017-12-13 | Stop reason: SDUPTHER

## 2017-09-19 RX ORDER — TRAZODONE HYDROCHLORIDE 150 MG/1
TABLET ORAL
Qty: 90 TAB | Refills: 0 | Status: SHIPPED | OUTPATIENT
Start: 2017-09-19 | End: 2017-12-13 | Stop reason: SDUPTHER

## 2017-09-19 RX ORDER — TRAZODONE HYDROCHLORIDE 150 MG/1
TABLET ORAL
Qty: 90 TAB | Refills: 0 | Status: SHIPPED | OUTPATIENT
Start: 2017-09-19 | End: 2017-09-19 | Stop reason: SDUPTHER

## 2017-12-13 DIAGNOSIS — E03.9 HYPOTHYROIDISM, ACQUIRED: ICD-10-CM

## 2017-12-13 DIAGNOSIS — F51.01 PRIMARY INSOMNIA: ICD-10-CM

## 2017-12-14 RX ORDER — LEVOTHYROXINE SODIUM 0.1 MG/1
TABLET ORAL
Qty: 90 TAB | Refills: 0 | Status: SHIPPED | OUTPATIENT
Start: 2017-12-14 | End: 2018-03-21 | Stop reason: SDUPTHER

## 2017-12-14 RX ORDER — TRAZODONE HYDROCHLORIDE 150 MG/1
TABLET ORAL
Qty: 90 TAB | Refills: 2 | Status: SHIPPED | OUTPATIENT
Start: 2017-12-14 | End: 2018-03-21 | Stop reason: SDUPTHER

## 2018-01-29 ENCOUNTER — RESOLUTE PROFESSIONAL BILLING HOSPITAL PROF FEE (OUTPATIENT)
Dept: HOSPITALIST | Facility: MEDICAL CENTER | Age: 58
End: 2018-01-29
Payer: COMMERCIAL

## 2018-01-29 ENCOUNTER — HOSPITAL ENCOUNTER (INPATIENT)
Facility: MEDICAL CENTER | Age: 58
LOS: 1 days | DRG: 918 | End: 2018-01-30
Attending: EMERGENCY MEDICINE | Admitting: HOSPITALIST
Payer: COMMERCIAL

## 2018-01-29 DIAGNOSIS — R45.851 SUICIDAL IDEATION: ICD-10-CM

## 2018-01-29 DIAGNOSIS — T39.1X2A INTENTIONAL ACETAMINOPHEN OVERDOSE, INITIAL ENCOUNTER (HCC): ICD-10-CM

## 2018-01-29 PROBLEM — T40.601A OPIATE OVERDOSE (HCC): Status: ACTIVE | Noted: 2018-01-29

## 2018-01-29 LAB
ALBUMIN SERPL BCP-MCNC: 4.8 G/DL (ref 3.2–4.9)
ALBUMIN/GLOB SERPL: 1.7 G/DL
ALP SERPL-CCNC: 58 U/L (ref 30–99)
ALT SERPL-CCNC: 25 U/L (ref 2–50)
AMPHET UR QL SCN: NEGATIVE
ANION GAP SERPL CALC-SCNC: 9 MMOL/L (ref 0–11.9)
APAP SERPL-MCNC: 20 UG/ML (ref 10–30)
AST SERPL-CCNC: 20 U/L (ref 12–45)
BARBITURATES UR QL SCN: NEGATIVE
BASOPHILS # BLD AUTO: 0.2 % (ref 0–1.8)
BASOPHILS # BLD: 0.02 K/UL (ref 0–0.12)
BENZODIAZ UR QL SCN: NEGATIVE
BILIRUB SERPL-MCNC: 0.7 MG/DL (ref 0.1–1.5)
BUN SERPL-MCNC: 17 MG/DL (ref 8–22)
BZE UR QL SCN: NEGATIVE
CALCIUM SERPL-MCNC: 9.9 MG/DL (ref 8.5–10.5)
CANNABINOIDS UR QL SCN: NEGATIVE
CHLORIDE SERPL-SCNC: 104 MMOL/L (ref 96–112)
CO2 SERPL-SCNC: 27 MMOL/L (ref 20–33)
CREAT SERPL-MCNC: 0.91 MG/DL (ref 0.5–1.4)
EOSINOPHIL # BLD AUTO: 0.01 K/UL (ref 0–0.51)
EOSINOPHIL NFR BLD: 0.1 % (ref 0–6.9)
ERYTHROCYTE [DISTWIDTH] IN BLOOD BY AUTOMATED COUNT: 43.6 FL (ref 35.9–50)
ETHANOL BLD-MCNC: 0 G/DL
GLOBULIN SER CALC-MCNC: 2.9 G/DL (ref 1.9–3.5)
GLUCOSE SERPL-MCNC: 145 MG/DL (ref 65–99)
HCT VFR BLD AUTO: 49 % (ref 42–52)
HGB BLD-MCNC: 16 G/DL (ref 14–18)
IMM GRANULOCYTES # BLD AUTO: 0.04 K/UL (ref 0–0.11)
IMM GRANULOCYTES NFR BLD AUTO: 0.4 % (ref 0–0.9)
INR PPP: 1.16 (ref 0.87–1.13)
LYMPHOCYTES # BLD AUTO: 0.64 K/UL (ref 1–4.8)
LYMPHOCYTES NFR BLD: 6.4 % (ref 22–41)
MCH RBC QN AUTO: 27.4 PG (ref 27–33)
MCHC RBC AUTO-ENTMCNC: 32.7 G/DL (ref 33.7–35.3)
MCV RBC AUTO: 84 FL (ref 81.4–97.8)
METHADONE UR QL SCN: NEGATIVE
MONOCYTES # BLD AUTO: 0.41 K/UL (ref 0–0.85)
MONOCYTES NFR BLD AUTO: 4.1 % (ref 0–13.4)
NEUTROPHILS # BLD AUTO: 8.86 K/UL (ref 1.82–7.42)
NEUTROPHILS NFR BLD: 88.8 % (ref 44–72)
NRBC # BLD AUTO: 0 K/UL
NRBC BLD-RTO: 0 /100 WBC
OPIATES UR QL SCN: POSITIVE
OXYCODONE UR QL SCN: POSITIVE
PCP UR QL SCN: NEGATIVE
PLATELET # BLD AUTO: 156 K/UL (ref 164–446)
PMV BLD AUTO: 10.7 FL (ref 9–12.9)
POTASSIUM SERPL-SCNC: 3.8 MMOL/L (ref 3.6–5.5)
PROPOXYPH UR QL SCN: NEGATIVE
PROT SERPL-MCNC: 7.7 G/DL (ref 6–8.2)
PROTHROMBIN TIME: 14.5 SEC (ref 12–14.6)
RBC # BLD AUTO: 5.83 M/UL (ref 4.7–6.1)
SALICYLATES SERPL-MCNC: 1 MG/DL (ref 15–25)
SODIUM SERPL-SCNC: 140 MMOL/L (ref 135–145)
WBC # BLD AUTO: 10 K/UL (ref 4.8–10.8)

## 2018-01-29 PROCEDURE — 770001 HCHG ROOM/CARE - MED/SURG/GYN PRIV*

## 2018-01-29 PROCEDURE — 96366 THER/PROPH/DIAG IV INF ADDON: CPT

## 2018-01-29 PROCEDURE — 99223 1ST HOSP IP/OBS HIGH 75: CPT | Performed by: INTERNAL MEDICINE

## 2018-01-29 PROCEDURE — 700105 HCHG RX REV CODE 258: Performed by: EMERGENCY MEDICINE

## 2018-01-29 PROCEDURE — 700102 HCHG RX REV CODE 250 W/ 637 OVERRIDE(OP): Performed by: INTERNAL MEDICINE

## 2018-01-29 PROCEDURE — 85610 PROTHROMBIN TIME: CPT

## 2018-01-29 PROCEDURE — 96365 THER/PROPH/DIAG IV INF INIT: CPT

## 2018-01-29 PROCEDURE — 36415 COLL VENOUS BLD VENIPUNCTURE: CPT

## 2018-01-29 PROCEDURE — 80053 COMPREHEN METABOLIC PANEL: CPT

## 2018-01-29 PROCEDURE — 80307 DRUG TEST PRSMV CHEM ANLYZR: CPT

## 2018-01-29 PROCEDURE — 85025 COMPLETE CBC W/AUTO DIFF WBC: CPT

## 2018-01-29 PROCEDURE — 93005 ELECTROCARDIOGRAM TRACING: CPT | Performed by: EMERGENCY MEDICINE

## 2018-01-29 PROCEDURE — 700111 HCHG RX REV CODE 636 W/ 250 OVERRIDE (IP): Performed by: EMERGENCY MEDICINE

## 2018-01-29 PROCEDURE — 700101 HCHG RX REV CODE 250: Performed by: EMERGENCY MEDICINE

## 2018-01-29 PROCEDURE — 96375 TX/PRO/DX INJ NEW DRUG ADDON: CPT

## 2018-01-29 PROCEDURE — A9270 NON-COVERED ITEM OR SERVICE: HCPCS | Performed by: INTERNAL MEDICINE

## 2018-01-29 PROCEDURE — 99285 EMERGENCY DEPT VISIT HI MDM: CPT

## 2018-01-29 RX ORDER — ONDANSETRON 2 MG/ML
4 INJECTION INTRAMUSCULAR; INTRAVENOUS EVERY 4 HOURS PRN
Status: DISCONTINUED | OUTPATIENT
Start: 2018-01-29 | End: 2018-01-30 | Stop reason: HOSPADM

## 2018-01-29 RX ORDER — LEVOTHYROXINE SODIUM 0.1 MG/1
100 TABLET ORAL
Status: DISCONTINUED | OUTPATIENT
Start: 2018-01-30 | End: 2018-01-30 | Stop reason: HOSPADM

## 2018-01-29 RX ORDER — ALBUTEROL SULFATE 90 UG/1
2 AEROSOL, METERED RESPIRATORY (INHALATION) EVERY 6 HOURS PRN
Status: DISCONTINUED | OUTPATIENT
Start: 2018-01-29 | End: 2018-01-30 | Stop reason: HOSPADM

## 2018-01-29 RX ORDER — UBIDECARENONE 75 MG
100 CAPSULE ORAL DAILY
Status: DISCONTINUED | OUTPATIENT
Start: 2018-01-30 | End: 2018-01-30 | Stop reason: HOSPADM

## 2018-01-29 RX ORDER — BISACODYL 10 MG
10 SUPPOSITORY, RECTAL RECTAL
Status: DISCONTINUED | OUTPATIENT
Start: 2018-01-29 | End: 2018-01-30 | Stop reason: HOSPADM

## 2018-01-29 RX ORDER — SODIUM CHLORIDE 9 MG/ML
INJECTION, SOLUTION INTRAVENOUS CONTINUOUS
Status: DISCONTINUED | OUTPATIENT
Start: 2018-01-29 | End: 2018-01-30 | Stop reason: HOSPADM

## 2018-01-29 RX ORDER — LIDOCAINE 50 MG/G
1 PATCH TOPICAL EVERY 24 HOURS
Status: DISCONTINUED | OUTPATIENT
Start: 2018-01-29 | End: 2018-01-30 | Stop reason: HOSPADM

## 2018-01-29 RX ORDER — FLUTICASONE PROPIONATE 50 MCG
1 SPRAY, SUSPENSION (ML) NASAL DAILY
Status: DISCONTINUED | OUTPATIENT
Start: 2018-01-29 | End: 2018-01-29

## 2018-01-29 RX ORDER — PROMETHAZINE HYDROCHLORIDE 25 MG/1
12.5-25 TABLET ORAL EVERY 4 HOURS PRN
Status: DISCONTINUED | OUTPATIENT
Start: 2018-01-29 | End: 2018-01-30 | Stop reason: HOSPADM

## 2018-01-29 RX ORDER — POLYETHYLENE GLYCOL 3350 17 G/17G
1 POWDER, FOR SOLUTION ORAL
Status: DISCONTINUED | OUTPATIENT
Start: 2018-01-29 | End: 2018-01-30 | Stop reason: HOSPADM

## 2018-01-29 RX ORDER — TRAZODONE HYDROCHLORIDE 150 MG/1
150 TABLET ORAL
Status: DISCONTINUED | OUTPATIENT
Start: 2018-01-29 | End: 2018-01-30 | Stop reason: HOSPADM

## 2018-01-29 RX ORDER — AMOXICILLIN 250 MG
2 CAPSULE ORAL 2 TIMES DAILY
Status: DISCONTINUED | OUTPATIENT
Start: 2018-01-29 | End: 2018-01-30 | Stop reason: HOSPADM

## 2018-01-29 RX ORDER — M-VIT,TX,IRON,MINS/CALC/FOLIC 27MG-0.4MG
1 TABLET ORAL DAILY
COMMUNITY

## 2018-01-29 RX ORDER — ONDANSETRON 2 MG/ML
4 INJECTION INTRAMUSCULAR; INTRAVENOUS ONCE
Status: COMPLETED | OUTPATIENT
Start: 2018-01-29 | End: 2018-01-29

## 2018-01-29 RX ORDER — PROMETHAZINE HYDROCHLORIDE 25 MG/1
12.5-25 SUPPOSITORY RECTAL EVERY 4 HOURS PRN
Status: DISCONTINUED | OUTPATIENT
Start: 2018-01-29 | End: 2018-01-30 | Stop reason: HOSPADM

## 2018-01-29 RX ORDER — ONDANSETRON 4 MG/1
4 TABLET, ORALLY DISINTEGRATING ORAL EVERY 4 HOURS PRN
Status: DISCONTINUED | OUTPATIENT
Start: 2018-01-29 | End: 2018-01-30 | Stop reason: HOSPADM

## 2018-01-29 RX ORDER — TRAZODONE HYDROCHLORIDE 150 MG/1
150 TABLET ORAL NIGHTLY
COMMUNITY
End: 2018-01-29

## 2018-01-29 RX ORDER — OMEPRAZOLE 20 MG/1
20 CAPSULE, DELAYED RELEASE ORAL
Status: DISCONTINUED | OUTPATIENT
Start: 2018-01-29 | End: 2018-01-30 | Stop reason: HOSPADM

## 2018-01-29 RX ADMIN — ACETYLCYSTEINE 13600 MG: 200 SOLUTION ORAL; RESPIRATORY (INHALATION) at 10:54

## 2018-01-29 RX ADMIN — ACETYLCYSTEINE 4540 MG: 200 SOLUTION ORAL; RESPIRATORY (INHALATION) at 11:53

## 2018-01-29 RX ADMIN — TRAZODONE HYDROCHLORIDE 150 MG: 150 TABLET ORAL at 21:52

## 2018-01-29 RX ADMIN — ACETYLCYSTEINE 9080 MG: 200 SOLUTION ORAL; RESPIRATORY (INHALATION) at 18:51

## 2018-01-29 RX ADMIN — STANDARDIZED SENNA CONCENTRATE AND DOCUSATE SODIUM 2 TABLET: 8.6; 5 TABLET, FILM COATED ORAL at 21:52

## 2018-01-29 RX ADMIN — ONDANSETRON 4 MG: 2 INJECTION INTRAMUSCULAR; INTRAVENOUS at 11:02

## 2018-01-29 ASSESSMENT — LIFESTYLE VARIABLES: EVER_SMOKED: NEVER

## 2018-01-29 ASSESSMENT — PAIN SCALES - WONG BAKER: WONGBAKER_NUMERICALRESPONSE: DOESN'T HURT AT ALL

## 2018-01-29 ASSESSMENT — PAIN SCALES - GENERAL
PAINLEVEL_OUTOF10: 0
PAINLEVEL_OUTOF10: 0

## 2018-01-29 NOTE — ED NOTES
"Pt immediately roomed to 25 for below complaint.   Chief Complaint   Patient presents with   • Suicidal Ideation     Pt took \"a lot of norco\". Pts wife handed over bottles and pt took unknown amount of oxycodone , morphine sulf 15 mg, norco 5-325. Pt took them yesterday at 1530 and was hoping to just sleep them off and not tell his wife. Pt was having worsening vomiting and told his wife, but did not vomit any pills. Pt is A&Ox4. Pt on 1.5 L of O2 via NC.      /73   Pulse 65   Temp 35.8 °C (96.5 °F)   Resp 16   Ht 1.829 m (6')   Wt 90.7 kg (200 lb)   SpO2 94%   BMI 27.12 kg/m²   Triage complete. Pt given gown. Pt/Family educated on NPO status. Pt  On monitor. No needs. Will continue to monitor.    "

## 2018-01-29 NOTE — ASSESSMENT & PLAN NOTE
ERP called poison control, started on acetylcysteine per protocol.  LFTs not elevated. INR is slightly elevated.  Plan is to continue acetylcysteine per protocol  Supportive care    Admit to the floor with suicidal precautions per protocol. Currently is not actively suicidal.  Legal hold started in emergency department  Psychiatry consult requested

## 2018-01-29 NOTE — H&P
" Hospital Medicine History and Physical    Date of Service  1/29/2018    Chief Complaint  Chief Complaint   Patient presents with   • Suicidal Ideation     Pt took \"a lot of norco\". Pts wife handed over bottles and pt took unknown amount of oxycodone , morphine sulf 15 mg, norco 5-325. Pt took them yesterday at 1530 and was hoping to just sleep them off and not tell his wife. Pt was having worsening vomiting and told his wife, but did not vomit any pills. Pt is A&Ox4. Pt on 1.5 L of O2 via NC.        History of Presenting Illness  57 y.o. Male with history of chronic pain, heartburn, hypothyroidism, celiac disease (not on a gluten-free diet), who presented 1/29/2018 with intentional overdose with pills (total about 30 pills  of Norco 5/325 , Percocet 10/325, morphine 15 mg). He took his pills yesterday at about 18 hours ago. He fell a sleep, and after she woke up he had multiple times vomiting with gastric content. He still feels nauseated. Additionally, ROS positive for nausea, cramping in the abdomen diffusely, orthostatic dizziness. He is alert and oriented ×4. Denies current suicidal plans. Calm, cooperative.  Patient states that this happened first time that she attempted suicide, and he does not have a history of depression or any other mood disorder, and is not following with any psychiatrist. he acknowledged going through some sort of crisis, but did not provide any detailed information about the cause of his behavior. His wife is present at bedside, given supplemental information.  Tylenol level is elevated at 18 hours after the ingestion. ER physician called poison control, acetylcysteine started per protocol. Legal hold started by ERP.      Primary Care Physician  JACINTA Sahu.    Consultants  psychiatry    Code Status  Full code    Review of Systems  ROS  Please see HPI, all other systems were reviewed and are negative (AMA/CMS criteria)       Past Medical History  Past Medical " History:   Diagnosis Date   • Pain 5/10/12    left hip   • Arthritis    • Heart burn    • Indigestion    • Pain     right hip   • Unspecified disorder of thyroid     hypothyroid       Surgical History  Past Surgical History:   Procedure Laterality Date   • ANKLE ORIF  5/27/2014    Performed by Ian Escalera M.D. at SURGERY [facility]   • TIBIA PLATEAU ORIF  5/24/2014    Performed by Ian Escalera M.D. at SURGERY [facility]   • HIP ARTHROSCOPY  5/18/2012    Performed by MERARY SPENCER at SURGERY [facility]   • ACETABULAR OSTEOTOMY  5/18/2012    Performed by MERARY SPENCER at SURGERY [facility]   • SYNOVECTOMY  5/18/2012    Performed by MERARY SPENCER at [facility]   • HIP ARTHROPLASTY TOTAL  8/15/2011    Performed by MERARY SPENCER at [facility]   • RECOVERY  8/8/2011    Performed by SURGERY, IR-RECOVERY at SURGERY SAME DAY [facility]   • HIP ARTHROSCOPY  3/9/2011    right; Performed by MERARY SPENCER at SURGERY [facility]   • KNEE ARTHROSCOPY  6/2010    right   • DEBRIDEMENT  11/23/2009    right   • HIP ARTHROSCOPY  11/23/2009    right   • HAND SURGERY  2007    cyst excision and debridement left hand   • HERNIA REP ING BILAT  2005    and umbilical hernia repair   • ROTATOR CUFF REPAIR  2004    left   • NERVE ULNAR TRANSFER  2001    right   • OTHER ORTHOPEDIC SURGERY  1979    left hand and small ring finger       Medications  No current facility-administered medications on file prior to encounter.      Current Outpatient Prescriptions on File Prior to Encounter   Medication Sig Dispense Refill   • levothyroxine (SYNTHROID) 100 MCG Tab TAKE 1 TABLET BY MOUTH ONCE DAILY IN THE MORNING ON AN EMPTY STOMACH 90 Tab 0   • trazodone (DESYREL) 150 MG Tab TAKE 1 TABLET BY MOUTH ONCE DAILY AT BEDTIME 90 Tab 2   • fenofibrate (TRICOR) 145 MG Tab Take 1 Tab by mouth every day. 90 Tab 1   • pravastatin (PRAVACHOL) 40 MG tablet Take 1 Tab by mouth  every bedtime. 90 Tab 0   • glucosamine Sulfate 500 MG Cap Take 500 mg by mouth 2 times a day, with meals.     • Inulin (FIBER CHOICE PO) Take 2 Caps by mouth every day.     • Iron-Vitamin C (IRON 100/C PO) Take 1 Tab by mouth every evening.     • cyanocobalamin (VITAMIN B-12) 100 MCG Tab Take 100 mcg by mouth every day.     • VITAMIN D, CHOLECALCIFEROL, PO Take 1 Cap by mouth every day.     • omeprazole (PRILOSEC) 20 MG CPDR Take 1 Cap by mouth 2 times daily, before breakfast and dinner. 30 Cap 0       Family History  Family History   Problem Relation Age of Onset   • No Known Problems Mother    • Kidney Disease Father      kidney failure on dialysis   • Cancer Maternal Grandmother    • No Known Problems Maternal Grandfather    • No Known Problems Paternal Grandmother    • No Known Problems Paternal Grandfather    • Diabetes     • Hypertension     • Cancer     • Diabetes Sister      type 1   • GI Sister      celiac disease   • Cancer Paternal Uncle      ?   Father had kidney disease, on dialysis.  Mother healthy.      Social History  Social History   Substance Use Topics   • Smoking status: Never Smoker   • Smokeless tobacco: Never Used   • Alcohol use No       Allergies  Allergies   Allergen Reactions   • Nkda [No Known Drug Allergy]         Physical Exam  Laboratory   Hemodynamics  Temp (24hrs), Av.3 °C (97.3 °F), Min:35.8 °C (96.5 °F), Max:36.7 °C (98 °F)   Temperature: 36.3 °C (97.3 °F)  Pulse  Av.4  Min: 64  Max: 79    Blood Pressure: 138/67, NIBP: 138/87      Respiratory      Respiration: 16, Pulse Oximetry: 97 %, O2 Daily Delivery Respiratory : Silicone Nasal Cannula     Work Of Breathing / Effort: Mild  RLL Breath Sounds: Diminished, LLL Breath Sounds: Diminished    Physical Exam  Vitals/ General Appearance:   Weight/BMI: Body mass index is 27.12 kg/m².  Blood pressure 138/67, pulse 79, temperature 36.3 °C (97.3 °F), resp. rate 16, height 1.829 m (6'), weight 90.7 kg (200 lb), SpO2 97 %. Vitals:     01/29/18 0905 01/29/18 1000 01/29/18 1052 01/29/18 1111   BP: 138/67      Pulse:  78 76 79   Resp: 16  16    Temp: 36.3 °C (97.3 °F)      SpO2:  98% 96% 97%   Weight:       Height:        Oxygen Therapy:  Pulse Oximetry: 97 %, O2 (LPM): 1.5, O2 Delivery: Nasal Cannula    Constitutional:  well developed, well nourished, non-toxic, no acute distress  HENMT: Normocephalic, atraumatic, b/l ears normal, nose normal  Eyes:  EOMI, conjunctiva normal, no discharge  Neck: no tracheal deviation, supple  Cardiovascular: normal heart rate,  Rhythm regular, no murmurs, no rubs or gallops; no cyanosis, clubbing or edema  Lungs: Respiratory effort is normal, normal breath sounds, breath sounds clear to auscultation b/l, no rales, rhonchi or wheezing  Abdomen: soft, non-tender, no guarding or rebound  Skin: warm, dry, no erythema, no rash  Neurologic: Alert and oriented, no focal deficits, CN II-XII normal  Psychiatric: Some anxiety or depression  Denies current suicidal ideations or plans. Denies hallucinations       Recent Labs      01/29/18   0800   SODIUM  140   POTASSIUM  3.8   CHLORIDE  104   CO2  27   GLUCOSE  145*   BUN  17   CREATININE  0.91   CALCIUM  9.9     Recent Labs      01/29/18   0800   ALTSGPT  25   ASTSGOT  20   ALKPHOSPHAT  58   TBILIRUBIN  0.7   GLUCOSE  145*     Recent Labs      01/29/18   0800   INR  1.16*             No results found for: TROPONINI  Urinalysis:    Lab Results  Component Value Date/Time   SPECGRAVITY 1.017 05/30/2014 0200   GLUCOSEUR Negative 05/30/2014 0200   KETONES Negative 05/30/2014 0200   NITRITE Negative 05/30/2014 0200        Imaging  No orders to display      Assessment/Plan     I anticipate this patient will require at least two midnights for appropriate medical management, necessitating inpatient admission.    Overdose on Tylenol, intentional self-harm, initial encounter (CMS-HCC)   Assessment & Plan    ERP called poison control, started on acetylcysteine per protocol.  LFTs not  elevated. INR is slightly elevated.  Plan is to continue acetylcysteine per protocol  Supportive care    Admit to the floor with suicidal precautions per protocol. Currently is not actively suicidal.  Legal hold started in emergency department  Psychiatry consult requested        Opiate overdose   Overview    Patient took leftovers of  his prescription medications, including oxycodone/Tylenol, hydrocodone/Tylenol, morphine, total dose is unclear, but probably around 30 pills total  Patient took a nap, but there are no signs of CNS depression 18 hours after ingestion. Most of the medication must be cleared up from the system by this time , therefore I didn't feel like he needs intensive care unit level  Pulse oximetry on the floor will suffice     Hypothyroidism, acquired- (present on admission)   Assessment & Plan    Continue home dose of levothyroxine  Recheck TSH        Gastroesophageal reflux disease without esophagitis- (present on admission)   Assessment & Plan    Stable  Continue omeprazole.        Celiac disease- (present on admission)   Assessment & Plan    Patient doesn't keep up with gluten-free diet.  I don't feel like he needs to follow gluten-free diet in the hospital            Prophylaxis: lovenox         Mr. Morrison was here with a confirmed overdose of T40.4 - Synthetic narcotics; he has other known overdoses: T40.2 - Other opioids.        I spent 80 minutes evaluating Rafy Morrison, reviewing the chart, vitals, labs and imaging, discussing the case with ED physician, medication reconciliation, placing orders and enacting the plan above.    Sections of this note have been dictated using Dragon Speech recognition software. While care and attention has been placed to ensure the accuracy of this note, there can be occasional typographical errors that may be missed and I apologize if this situation occurs. Please take these errors into context. If questions occur please do not hesitate to call or  notify the author of this note for clarification.      CC JACINTA Sahu.

## 2018-01-29 NOTE — ED NOTES
Wife previously called poison control and told to come to hospital. Case number [id number]. Speaking to poison control at this time.

## 2018-01-29 NOTE — ED NOTES
Pt began feeling warm and face reddened. No hives or swelling. Spoke to pharmacy who stated to slow down rate to 6 hour infusion. Family updated and agreeable.

## 2018-01-29 NOTE — ED NOTES
"Med rec updated and complete  Allergies reviewed  Pt states \"No antibiotics in the last 30 day\".    "

## 2018-01-29 NOTE — ASSESSMENT & PLAN NOTE
Patient doesn't keep up with gluten-free diet.  I don't feel like he needs to follow gluten-free diet in the hospital

## 2018-01-29 NOTE — ED PROVIDER NOTES
"ED Provider Note    CHIEF COMPLAINT  Chief Complaint   Patient presents with   • Suicidal Ideation     Pt took \"a lot of norco\". Pts wife handed over bottles and pt took unknown amount of oxycodone , morphine sulf 15 mg, norco 5-325. Pt took them yesterday at 1530 and was hoping to just sleep them off and not tell his wife. Pt was having worsening vomiting and told his wife, but did not vomit any pills. Pt is A&Ox4. Pt on 1.5 L of O2 via NC.        HPI  Rafy Morrison is a 57 y.o. male who presents after an overdose. The patient describes taking a lots of leftover Percocet and Vicodin yesterday. This was at 3:30. His intention had been to go to sleep and never wake up. He is presently undergoing marital issues with his wife. He describes having a addiction to pornography, and this is harmed his wife. He does not want to upset her anymore. However, he awoke. His wife points out that he seemed pretty groggy, nothing out of the ordinary for a nap. This morning he had quite a bit of nausea and vomiting. She did not think too much of it was treated with either Phenergan or Compazine. He is better after this. I will, he did finally describe what had happened. It sounds as if he took between 30 and 40 tablets of this medicine. An exact count is not possible as he did not have anywhere and it did begin with. Presently feels a little bit of nausea and is otherwise feeling fine. He presently does not feel like he wants to harm himself. He is regretful of what had happened. He has not had any previous attempts of self injury or suicide attempts. Denies any other ingestion. There is no other complaint.    PAST MEDICAL HISTORY  Past Medical History:   Diagnosis Date   • Arthritis    • Heart burn    • Indigestion    • Pain     right hip   • Pain 5/10/12    left hip   • Unspecified disorder of thyroid     hypothyroid       FAMILY HISTORY  Family History   Problem Relation Age of Onset   • No Known Problems Mother    • " Kidney Disease Father      kidney failure on dialysis   • Cancer Maternal Grandmother    • No Known Problems Maternal Grandfather    • No Known Problems Paternal Grandmother    • No Known Problems Paternal Grandfather    • Diabetes     • Hypertension     • Cancer     • Diabetes Sister      type 1   • GI Sister      celiac disease   • Cancer Paternal Uncle      ?       SOCIAL HISTORY  Social History   Substance Use Topics   • Smoking status: Never Smoker   • Smokeless tobacco: Never Used   • Alcohol use No         SURGICAL HISTORY  Past Surgical History:   Procedure Laterality Date   • ANKLE ORIF  5/27/2014    Performed by Ian Escalera M.D. at SURGERY [facility]   • TIBIA PLATEAU ORIF  5/24/2014    Performed by Ian Escalera M.D. at SURGERY [facility]   • HIP ARTHROSCOPY  5/18/2012    Performed by MERARY SPENCER at [facility]   • ACETABULAR OSTEOTOMY  5/18/2012    Performed by MERARY SPENCER at [facility]   • SYNOVECTOMY  5/18/2012    Performed by MERARY SPENCER at [facility]   • HIP ARTHROPLASTY TOTAL  8/15/2011    Performed by MERARY SPENCER at [facility]   • RECOVERY  8/8/2011    Performed by STACEY, IR-RECOVERY at SURGERY SAME DAY [facility]   • HIP ARTHROSCOPY  3/9/2011    right; Performed by MERARY SPENCER at [facility]   • KNEE ARTHROSCOPY  6/2010    right   • DEBRIDEMENT  11/23/2009    right   • HIP ARTHROSCOPY  11/23/2009    right   • HAND SURGERY  2007    cyst excision and debridement left hand   • HERNIA REP ING BILAT  2005    and umbilical hernia repair   • ROTATOR CUFF REPAIR  2004    left   • NERVE ULNAR TRANSFER  2001    right   • OTHER ORTHOPEDIC SURGERY  1979    left hand and small ring finger       CURRENT MEDICATIONS  Home Medications     Reviewed by Susan Bhatti R.N. (Registered Nurse) on 01/29/18 at 0790  Med List Status: Partial   Medication Last Dose Status   albuterol (PROAIR  HFA) 108 (90 BASE) MCG/ACT Aero Soln inhalation aerosol prn Active   ascorbic acid (VITAMIN C) 500 MG tablet  Active   cyanocobalamin (VITAMIN B-12) 100 MCG Tab 1/29/2018 Active   fenofibrate (TRICOR) 145 MG Tab  Active   fluticasone (FLONASE) 50 MCG/ACT nasal spray  Active   glucosamine Sulfate 500 MG Cap  Active   Inulin (FIBER CHOICE PO) 1/29/2018 Active   Iron-Vitamin C (IRON 100/C PO)  Active   levothyroxine (SYNTHROID) 100 MCG Tab 1/28/2018 Active   Loratadine 10 MG Cap unknown Active   magnesium citrate Solution 1/27/2018 Active   omeprazole (PRILOSEC) 20 MG CPDR 1/28/2018 Active   pravastatin (PRAVACHOL) 40 MG tablet  Active   trazodone (DESYREL) 150 MG Tab  Active   trazodone (DESYREL) 150 MG Tab 1/27/2018 Active   VITAMIN D, CHOLECALCIFEROL, PO 1/28/2018 Active                I have reviewed the nurses notes and/or the list brought with the patient.    ALLERGIES  Allergies   Allergen Reactions   • Nkda [No Known Drug Allergy]        REVIEW OF SYSTEMS  See HPI for further details. Review of systems as above, otherwise all other systems are negative.     PHYSICAL EXAM  VITAL SIGNS: /75   Pulse 64   Temp 36.7 °C (98 °F)   Resp 14   Ht 1.829 m (6')   Wt 90.7 kg (200 lb)   SpO2 94%   BMI 27.12 kg/m²   Constitutional: Well appearing patient in no acute distress.  Not toxic, nor ill in appearance.  HENT: Mucus membranes moist.  Oropharynx is clear.  Eyes: Pupils equally round.  No scleral icterus.   Neck: Full nontender range of motion.  Lymphatic: No cervical lymphadenopathy noted.   Cardiovascular: Regular heart rate and rhythm.  No murmurs, rubs, nor gallop appreciated.   Thorax & Lungs: Chest is nontender.  Lungs are clear to auscultation with good air movement bilaterally.  No wheeze, rhonchi, nor rales.   Abdomen: Soft, with no tenderness, rebound nor guarding.  No mass, pulsatile mass, nor hepatosplenomegaly appreciated.  Skin: No purpura nor petechia noted.  Extremities/Musculoskeletal: No  sign of trauma.  Calves are nontender with no cords nor edema.  No Cindy's sign.  Pulses are intact all around.   Neurologic: Alert & oriented.  Strength and sensation is intact all around.  Gait is normal.  Psychiatric: Normal affect appropriate for the clinical situation.    EKG  I interpreted this EKG myself.  This is a 12-lead study.  The rhythm is sinus with a rate of 64. Intervals are unremarkable.  There are no ST segment nor T wave abnormalities.  Interpretation: No ST segment elevation myocardial infarction.    LABS  Labs Reviewed   URINE DRUG SCREEN - Abnormal; Notable for the following:        Result Value    Opiates Positive (*)     Oxycodone Positive (*)     All other components within normal limits   COMP METABOLIC PANEL - Abnormal; Notable for the following:     Glucose 145 (*)     All other components within normal limits    Narrative:     Indicate which anticoagulants the patient is on:->NONE   SALICYLATE - Abnormal; Notable for the following:     Salicylates, Quant. 1 (*)     All other components within normal limits    Narrative:     Indicate which anticoagulants the patient is on:->NONE   PROTHROMBIN TIME - Abnormal; Notable for the following:     INR 1.16 (*)     All other components within normal limits    Narrative:     Indicate which anticoagulants the patient is on:->NONE   DIAGNOSTIC ALCOHOL    Narrative:     Indicate which anticoagulants the patient is on:->NONE   ACETAMINOPHEN    Narrative:     Indicate which anticoagulants the patient is on:->NONE   ESTIMATED GFR    Narrative:     Indicate which anticoagulants the patient is on:->NONE   CBC WITH DIFFERENTIAL       MEDICAL RECORD  I have reviewed patient's medical record and pertinent results are listed above.    COURSE & MEDICAL DECISION MAKING  I have reviewed any medical record information, laboratory studies and radiographic results as noted above.  This patient presents with a significant overdose. By my calculations is possible he  had over 140 mg/kg ingestion. He still has a markedly elevated Tylenol level, many hours after his ingestion. I spoke with poison control center, who agrees that the patient requires treatment with Mucomyst. We will initiate this. In the interim, the patient is placed on the start of the legal hold, obviously this point he is not medically cleared. His laboratories do show mild elevation of his INR, however his AST and ALT are normal. We will follow this. I discussed the patient's case with the pharmacist, as well as with the admitting hospitalist. He is admitted. Please note the Poison Control Center case number is [id number]      FINAL IMPRESSION  1. Intentional acetaminophen overdose, initial encounter (CMS-[facility])    2. Suicidal ideation    . Mr. Morrison was here with a confirmed overdose of T40.2 - Other opioids; he has no other known overdoses.       This dictation was created using voice recognition software.    Electronically signed by: Jj Maurer, 1/29/2018 8:22 AM

## 2018-01-30 VITALS
TEMPERATURE: 97.8 F | DIASTOLIC BLOOD PRESSURE: 66 MMHG | RESPIRATION RATE: 18 BRPM | WEIGHT: 195.11 LBS | SYSTOLIC BLOOD PRESSURE: 118 MMHG | HEIGHT: 72 IN | BODY MASS INDEX: 26.43 KG/M2 | OXYGEN SATURATION: 96 % | HEART RATE: 86 BPM

## 2018-01-30 PROBLEM — T40.601A OPIATE OVERDOSE (HCC): Status: RESOLVED | Noted: 2018-01-29 | Resolved: 2018-01-30

## 2018-01-30 PROBLEM — T39.1X2A OVERDOSE ON TYLENOL, INTENTIONAL SELF-HARM, INITIAL ENCOUNTER (HCC): Status: RESOLVED | Noted: 2018-01-29 | Resolved: 2018-01-30

## 2018-01-30 LAB
ALBUMIN SERPL BCP-MCNC: 4.5 G/DL (ref 3.2–4.9)
ALBUMIN SERPL BCP-MCNC: 5 G/DL (ref 3.2–4.9)
ALBUMIN/GLOB SERPL: 2 G/DL
ALBUMIN/GLOB SERPL: 2.6 G/DL
ALP SERPL-CCNC: 46 U/L (ref 30–99)
ALP SERPL-CCNC: 54 U/L (ref 30–99)
ALT SERPL-CCNC: 19 U/L (ref 2–50)
ALT SERPL-CCNC: 19 U/L (ref 2–50)
ANION GAP SERPL CALC-SCNC: 10 MMOL/L (ref 0–11.9)
ANION GAP SERPL CALC-SCNC: 11 MMOL/L (ref 0–11.9)
APAP SERPL-MCNC: <10 UG/ML (ref 10–30)
AST SERPL-CCNC: 15 U/L (ref 12–45)
AST SERPL-CCNC: 18 U/L (ref 12–45)
BASOPHILS # BLD AUTO: 0.2 % (ref 0–1.8)
BASOPHILS # BLD: 0.02 K/UL (ref 0–0.12)
BILIRUB SERPL-MCNC: 0.7 MG/DL (ref 0.1–1.5)
BILIRUB SERPL-MCNC: 0.7 MG/DL (ref 0.1–1.5)
BUN SERPL-MCNC: 11 MG/DL (ref 8–22)
BUN SERPL-MCNC: 12 MG/DL (ref 8–22)
CALCIUM SERPL-MCNC: 8.9 MG/DL (ref 8.5–10.5)
CALCIUM SERPL-MCNC: 9.4 MG/DL (ref 8.5–10.5)
CHLORIDE SERPL-SCNC: 103 MMOL/L (ref 96–112)
CHLORIDE SERPL-SCNC: 103 MMOL/L (ref 96–112)
CO2 SERPL-SCNC: 24 MMOL/L (ref 20–33)
CO2 SERPL-SCNC: 26 MMOL/L (ref 20–33)
CREAT SERPL-MCNC: 0.81 MG/DL (ref 0.5–1.4)
CREAT SERPL-MCNC: 0.82 MG/DL (ref 0.5–1.4)
EOSINOPHIL # BLD AUTO: 0.04 K/UL (ref 0–0.51)
EOSINOPHIL NFR BLD: 0.5 % (ref 0–6.9)
ERYTHROCYTE [DISTWIDTH] IN BLOOD BY AUTOMATED COUNT: 43.6 FL (ref 35.9–50)
GLOBULIN SER CALC-MCNC: 1.9 G/DL (ref 1.9–3.5)
GLOBULIN SER CALC-MCNC: 2.3 G/DL (ref 1.9–3.5)
GLUCOSE SERPL-MCNC: 103 MG/DL (ref 65–99)
GLUCOSE SERPL-MCNC: 107 MG/DL (ref 65–99)
HCT VFR BLD AUTO: 49.5 % (ref 42–52)
HGB BLD-MCNC: 16.3 G/DL (ref 14–18)
IMM GRANULOCYTES # BLD AUTO: 0.03 K/UL (ref 0–0.11)
IMM GRANULOCYTES NFR BLD AUTO: 0.4 % (ref 0–0.9)
INR PPP: 1.14 (ref 0.87–1.13)
LYMPHOCYTES # BLD AUTO: 1.39 K/UL (ref 1–4.8)
LYMPHOCYTES NFR BLD: 16.8 % (ref 22–41)
MCH RBC QN AUTO: 27.4 PG (ref 27–33)
MCHC RBC AUTO-ENTMCNC: 32.9 G/DL (ref 33.7–35.3)
MCV RBC AUTO: 83.2 FL (ref 81.4–97.8)
MONOCYTES # BLD AUTO: 0.51 K/UL (ref 0–0.85)
MONOCYTES NFR BLD AUTO: 6.2 % (ref 0–13.4)
NEUTROPHILS # BLD AUTO: 6.29 K/UL (ref 1.82–7.42)
NEUTROPHILS NFR BLD: 75.9 % (ref 44–72)
NRBC # BLD AUTO: 0 K/UL
NRBC BLD-RTO: 0 /100 WBC
PLATELET # BLD AUTO: 166 K/UL (ref 164–446)
PMV BLD AUTO: 9.8 FL (ref 9–12.9)
POTASSIUM SERPL-SCNC: 3.6 MMOL/L (ref 3.6–5.5)
POTASSIUM SERPL-SCNC: 3.6 MMOL/L (ref 3.6–5.5)
PROT SERPL-MCNC: 6.8 G/DL (ref 6–8.2)
PROT SERPL-MCNC: 6.9 G/DL (ref 6–8.2)
PROTHROMBIN TIME: 14.3 SEC (ref 12–14.6)
RBC # BLD AUTO: 5.95 M/UL (ref 4.7–6.1)
SODIUM SERPL-SCNC: 137 MMOL/L (ref 135–145)
SODIUM SERPL-SCNC: 140 MMOL/L (ref 135–145)
TSH SERPL DL<=0.005 MIU/L-ACNC: 2.84 UIU/ML (ref 0.38–5.33)
WBC # BLD AUTO: 8.3 K/UL (ref 4.8–10.8)

## 2018-01-30 PROCEDURE — 700102 HCHG RX REV CODE 250 W/ 637 OVERRIDE(OP): Performed by: INTERNAL MEDICINE

## 2018-01-30 PROCEDURE — 85610 PROTHROMBIN TIME: CPT

## 2018-01-30 PROCEDURE — 36415 COLL VENOUS BLD VENIPUNCTURE: CPT

## 2018-01-30 PROCEDURE — 700101 HCHG RX REV CODE 250: Performed by: INTERNAL MEDICINE

## 2018-01-30 PROCEDURE — 80053 COMPREHEN METABOLIC PANEL: CPT | Mod: 91

## 2018-01-30 PROCEDURE — 84443 ASSAY THYROID STIM HORMONE: CPT

## 2018-01-30 PROCEDURE — 700111 HCHG RX REV CODE 636 W/ 250 OVERRIDE (IP): Performed by: INTERNAL MEDICINE

## 2018-01-30 PROCEDURE — 99239 HOSP IP/OBS DSCHRG MGMT >30: CPT | Performed by: INTERNAL MEDICINE

## 2018-01-30 PROCEDURE — 80307 DRUG TEST PRSMV CHEM ANLYZR: CPT

## 2018-01-30 PROCEDURE — 700105 HCHG RX REV CODE 258: Performed by: INTERNAL MEDICINE

## 2018-01-30 PROCEDURE — 85025 COMPLETE CBC W/AUTO DIFF WBC: CPT

## 2018-01-30 PROCEDURE — A9270 NON-COVERED ITEM OR SERVICE: HCPCS | Performed by: INTERNAL MEDICINE

## 2018-01-30 RX ADMIN — LIDOCAINE 1 PATCH: 50 PATCH TOPICAL at 08:37

## 2018-01-30 RX ADMIN — ENOXAPARIN SODIUM 40 MG: 100 INJECTION SUBCUTANEOUS at 08:33

## 2018-01-30 RX ADMIN — SODIUM CHLORIDE: 9 INJECTION, SOLUTION INTRAVENOUS at 02:00

## 2018-01-30 RX ADMIN — OMEPRAZOLE 20 MG: 20 CAPSULE, DELAYED RELEASE ORAL at 06:00

## 2018-01-30 RX ADMIN — VITAMIN B12 0.1 MG ORAL TABLET 100 MCG: 0.1 TABLET ORAL at 09:00

## 2018-01-30 RX ADMIN — LEVOTHYROXINE SODIUM 100 MCG: 100 TABLET ORAL at 06:00

## 2018-01-30 RX ADMIN — STANDARDIZED SENNA CONCENTRATE AND DOCUSATE SODIUM 2 TABLET: 8.6; 5 TABLET, FILM COATED ORAL at 08:33

## 2018-01-30 ASSESSMENT — PATIENT HEALTH QUESTIONNAIRE - PHQ9
1. LITTLE INTEREST OR PLEASURE IN DOING THINGS: SEVERAL DAYS
SUM OF ALL RESPONSES TO PHQ QUESTIONS 1-9: 5
8. MOVING OR SPEAKING SO SLOWLY THAT OTHER PEOPLE COULD HAVE NOTICED. OR THE OPPOSITE, BEING SO FIGETY OR RESTLESS THAT YOU HAVE BEEN MOVING AROUND A LOT MORE THAN USUAL: NOT AT ALL
9. THOUGHTS THAT YOU WOULD BE BETTER OFF DEAD, OR OF HURTING YOURSELF: NOT AT ALL
3. TROUBLE FALLING OR STAYING ASLEEP OR SLEEPING TOO MUCH: NEARLY EVERY DAY
4. FEELING TIRED OR HAVING LITTLE ENERGY: NOT AT ALL
2. FEELING DOWN, DEPRESSED, IRRITABLE, OR HOPELESS: SEVERAL DAYS
SUM OF ALL RESPONSES TO PHQ9 QUESTIONS 1 AND 2: 2
5. POOR APPETITE OR OVEREATING: NOT AT ALL
7. TROUBLE CONCENTRATING ON THINGS, SUCH AS READING THE NEWSPAPER OR WATCHING TELEVISION: NOT AT ALL
6. FEELING BAD ABOUT YOURSELF - OR THAT YOU ARE A FAILURE OR HAVE LET YOURSELF OR YOUR FAMILY DOWN: NOT AL ALL

## 2018-01-30 ASSESSMENT — LIFESTYLE VARIABLES
ALCOHOL_USE: NO
EVER_SMOKED: NEVER

## 2018-01-30 ASSESSMENT — PAIN SCALES - GENERAL: PAINLEVEL_OUTOF10: 4

## 2018-01-30 NOTE — PROGRESS NOTES
Received report from night shift RN, assumed care. Pt. Is awake, on bed. A&Ox4, pleasant, denies SI. Up stand by assist , pt. denies pain. On continuous IV mucomyst at 63 cc/hr , tolerating well. Pt. On 1L O2 via NC, tolerating well. Plan of care was safety, comfort and rest. Discussed plan of care. Bed alarm in use, call light and personal belongings within reach, bed kept low, treaded socks on. Assisted as necessary. Kept rested and comfortable at all times.

## 2018-01-30 NOTE — ED NOTES
RECEIVED REPORT FROM Rn, PT IS AAOX4, PT IS IN NAD. PT IS ON A L2K HOLD, PT IS BEING ADMITTED. PT WAS TOLD OF POC. SERCUITY  WAS CALLED TO GET PTS BELONGS. PT WAS TOLD POC.

## 2018-01-30 NOTE — DISCHARGE SUMMARY
Hospital Medicine Discharge Note     Patient ID:  Rafy Morrison  [id number]  57 y.o.male  1960    Admit Date:  1/29/2018       Discharge Date:   1/30/2018    Primary Care Provider: GAUTAM Sahu    Admitting Physician: Alexy Hu M.D.  Discharging Physician: Ash Palacio M.D.    Chief Complaint: Overdose, SI     Discharge Diagnoses:   Active Problems:    Overdose on Tylenol, intentional self-harm, initial encounter (CMS-HCC)- patient evaluated by inpatient psychiatry, patient cleared from legal hold. Patient provided Mucomyst Iv.     Celiac disease- continue outpatient management follow-up with PCP    Gastroesophageal reflux disease without esophagitis- continue outpatient management follow-up with GI and PCP    Hypothyroidism, acquired- continue outpatient medication follow-up with PCP    Opiate overdose- psych evaluate patient, patient cleared at this time. Follow-up with PCP or pain management provider      Chronic Medical Problems:  Past Medical History:   Diagnosis Date   • Arthritis    • Heart burn    • Indigestion    • Pain     right hip   • Pain 5/10/12    left hip   • Unspecified disorder of thyroid     hypothyroid       Code Status: Full Code    Hospital Summary:       Please refer to H&P by Alexy Hu M.D. on 1/29/2018 for full details.      In brief, Rafy Morrison is a 57 y.o. male who was admitted 1/29/2018 for suicidal ideation and narcotic overdose.   Patient has history of arthritis, heartburn, indigestion, pain to his right hip and hypothyroid.   Patient presented to the emergency room with his wife after approximately taking 30 tablets of narcotics in an attempt to commit suicide. Patient was admitted, and poison control was notified. Patient's Tylenol level was elevated approximately 18 hours after ingestion therefore patient was started on acetylcysteine per protocol. The patient was placed on legal hold. Psychiatry was consulted. Patient's labs were  monitored closely as well as electrolytes. LFTs remained baseline. Patient was monitored on continuous pulse oximetry with frequent vital signs. Patient was continued on his home dose of omeprazole as well as levothyroxine. Lovenox was initiated for Prophylaxis for DVT.    Patient was evaluated by psychiatry who discontinued legal hold. Patient's electrolytes and labs remained normal with the exception of elevated glucose. Patient states he is under significant amount of stress at home and made a poor decision. Patient states he is not suicidal at this time. Patient will follow-up with his PCP. Patient is discharged at this time with close follow-up. Patient's lungs are clear bilaterally on auscultation, patient's heart rate regular on auscultation. Patient is adequately eating and drinking at this time with no complaints of nausea or emesis. Patient is adequately able to hydrate himself. Patient will follow-up with PCP for referral to psychiatrist.   Therefore, he is discharged in good and stable condition with close outpatient follow-up.    Consultants:      Psych-  Dr. Aguilar    Studies:    Imaging/ Testing:      No orders to display         Laboratory:   Recent Labs      01/29/18   0800  01/30/18   0153   WBC  10.0  8.3   RBC  5.83  5.95   HEMOGLOBIN  16.0  16.3   HEMATOCRIT  49.0  49.5   MCV  84.0  83.2   MCH  27.4  27.4   MCHC  32.7*  32.9*   RDW  43.6  43.6   PLATELETCT  156*  166   MPV  10.7  9.8       Recent Labs      01/29/18   0800  01/30/18   0153  01/30/18   0547   SODIUM  140  137  140   POTASSIUM  3.8  3.6  3.6   CHLORIDE  104  103  103   CO2  27  24  26   GLUCOSE  145*  103*  107*   BUN  17  12  11   CREATININE  0.91  0.82  0.81   CALCIUM  9.9  8.9  9.4       Recent Labs      01/29/18   0800  01/30/18   0153  01/30/18   0547   ALTSGPT  25  19  19   ASTSGOT  20  18  15   ALKPHOSPHAT  58  46  54   TBILIRUBIN  0.7  0.7  0.7   GLUCOSE  145*  103*  107*          Recent Labs      01/30/18   0153    TSHULTRASEN  2.840       Procedures/Surgeries:        None     Disposition:    Discharge home    Condition:  Stable    Instructions:   Activity: As tolerated.  Diet: as tolerated   Followup: with PCP   Instructions:  -Please follow up with PCP.   - Avoid Tylenol products for a few days.   -Given instructions to return to the ER if any new or worsening symptoms, worsening condition, or failure to improve  -Call PCP for followup  -No smoking, no alcohol, no caffeine  -Encourage risk factor reduction with tobacco and alcohol abstinence, diet changes, weight loss, and exercise.     Follow-Up  GAUTAM Sahu  [address]  [place] [id number] [phone]    Schedule an appointment as soon as possible for a visit in 2 weeks  for hospital follow up and referrals         Discharge Medications:           Medication List      ASK your doctor about these medications      Instructions   cyanocobalamin 100 MCG Tabs  Commonly known as:  VITAMIN B-12   Take 100 mcg by mouth every day.  Dose:  100 mcg     fenofibrate 145 MG Tabs  Commonly known as:  TRICOR   Take 1 Tab by mouth every day.  Dose:  145 mg     FIBER CHOICE PO   Take 2 Caps by mouth every day.  Dose:  2 Cap     glucosamine Sulfate 500 MG Caps   Take 500 mg by mouth 2 times a day, with meals.  Dose:  500 mg     IRON 100/C PO   Take 1 Tab by mouth every evening.  Dose:  1 Tab     levothyroxine 100 MCG Tabs  Commonly known as:  SYNTHROID   TAKE 1 TABLET BY MOUTH ONCE DAILY IN THE MORNING ON AN EMPTY STOMACH     MAGNESIUM PO   Take 1 Cap by mouth every evening.  Dose:  1 Cap     omeprazole 20 MG delayed-release capsule  Commonly known as:  PRILOSEC   Take 1 Cap by mouth 2 times daily, before breakfast and dinner.  Dose:  20 mg     pravastatin 40 MG tablet  Commonly known as:  PRAVACHOL   Take 1 Tab by mouth every bedtime.  Dose:  40 mg     therapeutic multivitamin-minerals Tabs   Take 1 Tab by mouth every day.  Dose:  1 Tab     trazodone  150 MG Tabs  Commonly known as:  DESYREL   TAKE 1 TABLET BY MOUTH ONCE DAILY AT BEDTIME     VITAMIN D (CHOLECALCIFEROL) PO   Take 1 Cap by mouth every day.  Dose:  1 Cap              Total time of the discharge process exceeds 38 minutes. This included face to face with the patient, medication reconciliation, care co ordination with Nursing  involved in patient care and discussion and co ordination with case management.     Please CC the above physicians    GAUTAM Deng  1/30/2018  12:57 PM

## 2018-01-30 NOTE — DISCHARGE INSTRUCTIONS
Discharge Instructions    Discharged to home by car with relative. Discharged via wheelchair, hospital escort: Refused.  Special equipment needed: Not Applicable    Be sure to schedule a follow-up appointment with your primary care doctor or any specialists as instructed.     Discharge Plan:   Diet Plan: Discussed (Hepatic)  Activity Level: Discussed (Activity as Tolerated)  Confirmed Follow up Appointment: Patient to Call and Schedule Appointment  Confirmed Symptoms Management: Discussed  Medication Reconciliation Updated: Yes  Influenza Vaccine Indication: Not indicated: Previously immunized this influenza season and > 8 years of age    I understand that a diet low in cholesterol, fat, and sodium is recommended for good health. Unless I have been given specific instructions below for another diet, I accept this instruction as my diet prescription.   Other diet: Hepatic    Special Instructions: None    · Is patient discharged on Warfarin / Coumadin?   No     Depression / Suicide Risk    As you are discharged from this [facility] facility, it is important to learn how to keep safe from harming yourself.    Recognize the warning signs:  · Abrupt changes in personality, positive or negative- including increase in energy   · Giving away possessions  · Change in eating patterns- significant weight changes-  positive or negative  · Change in sleeping patterns- unable to sleep or sleeping all the time   · Unwillingness or inability to communicate  · Depression  · Unusual sadness, discouragement and loneliness  · Talk of wanting to die  · Neglect of personal appearance   · Rebelliousness- reckless behavior  · Withdrawal from people/activities they love  · Confusion- inability to concentrate     If you or a loved one observes any of these behaviors or has concerns about self-harm, here's what you can do:  · Talk about it- your feelings and reasons for harming yourself  · Remove any means that you might use to hurt  "yourself (examples: pills, rope, extension cords, firearm)  · Get professional help from the community (Mental Health, Substance Abuse, psychological counseling)  · Do not be alone:Call your Safe Contact- someone whom you trust who will be there for you.  · Call your local CRISIS HOTLINE 893-3872 or 441-983-6510  · Call your local Children's Mobile Crisis Response Team [place] (816) 177-9796 or www[employer] [place]  · Call the toll free National Suicide Prevention Hotlines   · National Suicide Prevention Lifeline 232-546-SJLM (2177)  · [employer] Hope Line Network 800-SUICIDE (888-7104)      Overdose, Adult  A person can overdose on alcohol, drugs or both by accident or on purpose. If it was on purpose, it is a serious matter. Professional help should be sought. If the overdose was an accident, certain steps should be taken to make sure that it never happens again.  ACCIDENTAL OVERDOSE  Overdosing on prescription medications can be a result of:  · Not understanding the instructions.  · Misreading the label.  · Forgetting that you took a dose and then taking another by mistake. This situation happens a lot.  To make sure this does not happen again:  · Clarify the correct dosage with your caregiver.  · Place the correct dosage in a \"pill-minder\" container (labeled for each day and time of day).  · Have someone dispense your medicine.  Please be sure to follow-up with your primary care doctor as directed.  INTENTIONAL OVERDOSE  If the overdose was on purpose, it is a serious situation. Taking more than the prescribed amount of medications (including taking someone else's prescription), abusing street drugs or drinking an amount of alcohol that requires medical treatment can show a variety of possible problems. These may indicate you:  · Are depressed or suicidal.  · Are abusing drugs, took too much or combined different drugs to experiment with the effects.  · Mixed alcohol with drugs and did not realize the danger " "of doing so (this is drug abuse).  · Are suffering addiction to drugs and/or alcohol (also known as chemical dependency).  · Binge drink.  If you have not been referred to a mental health professional for help, it is important that you get help right away. Only a professional can determine which problems may exist and what the best course of treatment may be. It is your responsibility to follow-up with further evaluation or treatment as directed.   Alcohol is responsible for a large number of overdoses and unintended deaths among college-age young adults. Binge drinking is consuming 4-5 drinks in a short period of time. The amount of alcohol in standard servings of wine (5 oz.), beer (12 oz.) and distilled spirits (1.5 oz., 80 proof) is the same. Beer or wine can be just as dangerous to the binge drinker as \"hard\" liquor can be.   CONSEQUENCES OF BINGE DRINKING  Alcohol poisoning is the most serious consequence of binge drinking. This is a severe and potentially fatal physical reaction to an alcohol overdose. When too much alcohol is consumed, the brain does not get enough oxygen. The lack of oxygen will eventually cause the brain to shut down the voluntary functions that regulate breathing and heart rate. Symptoms of alcohol poisoning include:  · Vomiting.  · Passing out (unconsciousness).  · Cold, clammy, pale or bluish skin.  · Slow or irregular breathing.  WHAT SHOULD I DO NEXT?  If you have a history of drug abuse or suffer chemical dependency (alcoholism, drug addiction or both), you might consider the following:  · Talk with a qualified substance abuse counselor and consider entering a treatment program.  · Go to a detox facility if necessary.  · If you were attending self-help group meetings, consider returning to them and go often.  · Explore other resources located near you (see sources listed below).  If you are unsure if you have a substance abuse problem, ask yourself the following questions:  · Have " "you been told by friends or family that drugs/alcohol has become a problem?  · Do you get into fights when drinking or using drugs?  · Do you have blackouts (not remembering what you do while using)?  · Do you lie about use or amounts of drugs or alcohol you consume?  · Do you need chemicals to get you going?  · Do you suffer in work or school performance because of drug or alcohol use?  · Do you get sick from drug or alcohol use but continue to use anyway?  · Do you need drugs or alcohol to relate to people or feel comfortable in social situations?  · Do you use drugs or alcohol to forget problems?  If you answered \"Yes\" to any of the above questions, it means you show signs of chemical dependency and a professional evaluation is suggested. The longer the use of drugs and alcohol continues, the problems will become greater.  SEEK IMMEDIATE MEDICAL CARE IF:   · You feel like you might repeat your problematic behavior.  · You need someone to talk to and feel that it should not wait.  · You feel you are a danger to yourself or someone else.  · You feel like you are having a new reaction to medications you are taking, or you are getting worse after leaving a care center.  · You have an overwhelming urge to drink or use drugs.  Addiction cannot be cured, but it can be treated successfully. Treatment centers are listed in the yellow pages under: Cocaine, Narcotics, and Alcoholics Anonymous. Most hospitals and clinics can refer you to a specialized care center. The  government maintains a toll-free number for obtaining treatment referrals: 1-712.525.8654 or 1-833.403.7706 (TDD) and maintains a website: http://findtreatment.samhsa.gov. Other websites for additional information are: www.mentalhealth.samhsa.gov. and www.[place].gov.  In [place] treatment resources are listed in each Province with listings available under [employer] [facility] [employer] [facility] or similar titles.  Document Released: 12/20/2004 Document Revised: " 03/11/2013 Document Reviewed: 11/11/2009  [employer]® Patient Information ©2014 [employer].

## 2018-01-30 NOTE — CARE PLAN
Problem: Communication  Goal: The ability to communicate needs accurately and effectively will improve  Encouraging patient to voice feelings especially those of hurting himself     Problem: Safety  Goal: Will remain free from injury  q15m close obs, all un neccessary medical equipment removed from room, personal belongings removed from room, pt door open call light within reach, bed locked and in low position

## 2018-01-30 NOTE — CARE PLAN
Problem: Safety  Goal: Will remain free from injury  Outcome: PROGRESSING AS EXPECTED  Pt. Admitted because of OD. Denies SI. Q15 checks in place.     Problem: Discharge Barriers/Planning  Goal: Patient's continuum of care needs will be met  Outcome: PROGRESSING AS EXPECTED  Pt. On legal hold, pending psych consult.

## 2018-01-30 NOTE — ED NOTES
PT HAS A BED, REPORT WAS CALLED. RN WAS TOLD PT IS ON A L2K. PT WILL BE COMING WITH SECURITY AND PT HAS 2 BAGS OF BELONGS.

## 2018-01-30 NOTE — PROGRESS NOTES
2 RN skin check done with RN Rukhsana. All bony prominences are intact and blanching. Redness noted to the back but blanching. Healed surgical scars noted. No open wounds noted.

## 2018-01-30 NOTE — PROGRESS NOTES
Pt. With D/C order. Discussed D/C instructions. Removed PIV tip intact. Wife at bedside. Pt. Toileted before D/C. Refused flu vaccination.

## 2018-01-30 NOTE — PROGRESS NOTES
Late entry 2130: Received report and assumed care. Patient AAO x 4. Legal hold initiated in the ED and pt on close obs q15m. Removed belongings and security placed them in the locked closet. Only medically necessary equipment left in the room. 2nd IV to be started in order to run the continuous NS. Pt took medications whole with water and tolerated well. States he does not wear O2 at home but he was on 3L when he came up from the ED. Currently on 1 L of O2 via nasal cannula as his O2 saturation was going down to 87 % on RA. No other needs discussed. Bed locked and in low position, call light within reach, hourly rounding in place.

## 2018-01-30 NOTE — PROGRESS NOTES
Patient requesting that we call his wife and inform her of his location. Will call and leave a message if she doesn't answer.

## 2018-01-30 NOTE — PSYCHIATRY
"PSYCHIATRIC CONSULTATION:  Reason for admission: Overdose on Percocet and Vicodin   Reason for consult:  Suicide attempt    Requesting Physician:  Samuel Murillo M.D.  Supervising Physician:  Aliya Salcido MD   Legal status:  Legal hold discontinued  Chief Complaint:  \"I let my wife down\"    HPI:   Pt was hospitalized for a suicide attempt.  He took an unknown quantity of Percocet and Vicodin the afternoon of 1/28/18.  He states he fully meant to die, but it was an \"impulsive\" act with no prior plan.  He denies prior attempts or psychiatric history.  He currently denies suicidal ideation.    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  Approx 1 week ago, his wife kicked him out of their bedroom.  Since that time, he has been feeling \"worse and worse.\"  On the afternoon of 1/28, he states \"I just looked at my wife taking a nap, and I realized all the harm I had done to her.  I just didn't want to live so I took the pills.\"         Pt has spoken to his wife since his hospitalization.  She is supportive and will allow him to return home.  He is a Hinduism, and has been attending porn addiction groups through his [employer].  He has suffered from insomnia for many years but states \"last night I slept better than I have in years.  I feel like I have been living a lie.  I feel at peace now.\"  He acknowledges he is \"happy to be alive\" and \"has a lot to live for.\"  He is future oriented.  He is a full time student after a career changes and wishes to move with his wife to [place] after he graduates.      Psychiatric Review of Systems:current symptoms as reported by pt.  Depression:  Confirms symptoms of guilt and worthlessness       Krysta: denies     Anxiety/Panic Attacks denies     PTSD symptom: denies     Psychosis: denies        Medical Review of Systems: as reported by pt. All systems reviewed. Only those found to be + are noted below. All others " "are negative.   Neurological:    TBIs: denies      Szs: denies      Strokes: denies     Other medical symptoms:   Thyroid: confirms; under control with meds       Diabetes: denies      Cardiovascular disease: denies       Psychiatric Examination:  Vitals: Blood pressure 125/70, pulse 85, temperature 37.3 °C (99.2 °F), resp. rate 17, height 1.829 m (6'), weight 88.5 kg (195 lb 1.7 oz), SpO2 94 %.  Musculoskeletal: normal psychomotor activity, no tics or unusual mannerisms noted  Appearance: WDWN, appropriate dress and grooming. Behavior is calm, cooperative,  appropriate eye contact  Thoughts:  Linear, logical, no blocking of thought noted, no delusional content noted, not obviously responding to internal stimuli.  Speech: Normal rate and tahir, no pressuring of speech noted  Mood:  \"peaceful\"       Affect:  Euthymic; normal range of affect          SI/HI:  denies, no evidence of active SI/HI noted   Attention/Alertness: Alert and attentive   Memory: Recent and remote memory grossly intact     Orientation: A&Ox3     Fund of Knowledge:  Did not test   Insight/Judgement into symptoms:  Good   Neurological Testing: Not formally tested    Past Psychiatric Hx:   Diagnosis:  Denies   Medications:  Trazodone 150mg for sleep; Pt tried an unknown antidepressant over 10years ago for approx 1 week   Hospitalizations:  Denies   Suicide:  Denies   Outpatient:  Denies     Family Psychiatric Hx:  Denies     Social Hx:  Pt is originally from [place].  He moved to [place] approx 20 years ago for work.  He recently retired and went back to school for a career change.  He has 3 grown children.  He has been  30 years.  He is active in the [employer].      Drug/Alcohol/Tobacco Hx:  Drugs:  Denies   Alcohol:  Denies   Tobacco:  Denies     Medical Hx: labs, MARS, medications, etc were reviewed. Only those findings of potential interest to psychiatry are noted below:  Past Medical History:   Diagnosis Date   • Arthritis    • Heart " burn    • Indigestion    • Pain     right hip   • Pain 5/10/12    left hip   • Unspecified disorder of thyroid     hypothyroid     Allergies: Nkda [no known drug allergy]     Medications (currently prescribed at [facility]):    Labs:  TSH 2.840 uIU/mL     ECG:   Qtc 413    Cranial Imaging:   No recent imaging     ASSESSMENT:  Mr. Morrison is a 58 y/o who was hospitalized for a suicide attempt via overdose.  He primarily cites pornography addiction and recent fights with his spouse and the primary stressor.  Suicide attempt was not planned.  He currently denies suicidal ideation or symptoms of depression.      #Adjustment Disorder with disturbance in behavior and emotions   #Hypothyroidism   #pornography addiction     PLAN:  -Pt denies suicidal ideation, has full affect, is future oriented, and will discharge to care of his spouse  -Recommend providing script for zoloft 50mg Po qday on discharge   -counseled on outpatient follow-up     Legal status:  Legal hold discontinued    Signing off   Thank you for the consult.

## 2018-01-31 LAB — EKG IMPRESSION: NORMAL

## 2018-03-21 ENCOUNTER — OFFICE VISIT (OUTPATIENT)
Dept: MEDICAL GROUP | Facility: CLINIC | Age: 58
End: 2018-03-21
Payer: COMMERCIAL

## 2018-03-21 VITALS
SYSTOLIC BLOOD PRESSURE: 108 MMHG | BODY MASS INDEX: 29.68 KG/M2 | HEIGHT: 71 IN | TEMPERATURE: 98 F | RESPIRATION RATE: 16 BRPM | DIASTOLIC BLOOD PRESSURE: 64 MMHG | WEIGHT: 212 LBS | HEART RATE: 85 BPM | OXYGEN SATURATION: 94 %

## 2018-03-21 DIAGNOSIS — R10.2 PELVIC PAIN: ICD-10-CM

## 2018-03-21 DIAGNOSIS — R31.29 MICROHEMATURIA: ICD-10-CM

## 2018-03-21 LAB
APPEARANCE UR: CLEAR
BILIRUB UR STRIP-MCNC: ABNORMAL MG/DL
COLOR UR AUTO: YELLOW
GLUCOSE UR STRIP.AUTO-MCNC: ABNORMAL MG/DL
KETONES UR STRIP.AUTO-MCNC: ABNORMAL MG/DL
LEUKOCYTE ESTERASE UR QL STRIP.AUTO: ABNORMAL
NITRITE UR QL STRIP.AUTO: ABNORMAL
PH UR STRIP.AUTO: 5 [PH] (ref 5–8)
PROT UR QL STRIP: ABNORMAL MG/DL
RBC UR QL AUTO: ABNORMAL
SP GR UR STRIP.AUTO: 1.01
UROBILINOGEN UR STRIP-MCNC: 0.2 MG/DL

## 2018-03-21 PROCEDURE — 81002 URINALYSIS NONAUTO W/O SCOPE: CPT | Performed by: FAMILY MEDICINE

## 2018-03-21 PROCEDURE — 99213 OFFICE O/P EST LOW 20 MIN: CPT | Performed by: FAMILY MEDICINE

## 2018-03-21 RX ORDER — TRAZODONE HYDROCHLORIDE 150 MG/1
TABLET ORAL
Qty: 90 TAB | Refills: 0 | Status: SHIPPED | OUTPATIENT
Start: 2018-03-21 | End: 2019-02-13 | Stop reason: SDUPTHER

## 2018-03-21 RX ORDER — LORATADINE 10 MG/1
10 TABLET ORAL DAILY
COMMUNITY

## 2018-03-21 RX ORDER — LEVOTHYROXINE SODIUM 0.1 MG/1
TABLET ORAL
Qty: 90 TAB | Refills: 0 | Status: SHIPPED | OUTPATIENT
Start: 2018-03-21 | End: 2018-06-22 | Stop reason: SDUPTHER

## 2018-03-21 ASSESSMENT — PATIENT HEALTH QUESTIONNAIRE - PHQ9: CLINICAL INTERPRETATION OF PHQ2 SCORE: 0

## 2018-03-21 NOTE — PROGRESS NOTES
CC: Pelvic pain with urination for 2 weeks    HPI:   Rafy is a 57-year-old white male who presents today with the following.    Lower abdominal pain with urination for the past 2 weeks. He says it feels like sharp pains. He says he feels it when he is not urinating but it is very low grade. Does not keeping him from doing any of his usual activities. He denies change in bowel habits. Denies nausea or vomiting. Denies fever or chills. Denies groin pain or urethral burning with urination. Denies urgency or frequency. Denies urethral discharge.      Patient Active Problem List    Diagnosis Date Noted   • Pure hypercholesterolemia 03/29/2017   • Celiac disease 03/07/2017   • Seasonal allergic rhinitis due to pollen 03/07/2017   • Gastroesophageal reflux disease without esophagitis 03/07/2017   • Irritable bowel syndrome with diarrhea 03/07/2017   • Hypothyroidism, acquired 03/07/2017   • Primary insomnia 03/07/2017   • Left wrist pain 03/07/2017       Current Outpatient Prescriptions   Medication Sig Dispense Refill   • loratadine (CLARITIN) 10 MG Tab Take 10 mg by mouth every day.     • therapeutic multivitamin-minerals (THERAGRAN-M) Tab Take 1 Tab by mouth every day.     • MAGNESIUM PO Take 1 Cap by mouth every evening.     • levothyroxine (SYNTHROID) 100 MCG Tab TAKE 1 TABLET BY MOUTH ONCE DAILY IN THE MORNING ON AN EMPTY STOMACH 90 Tab 0   • trazodone (DESYREL) 150 MG Tab TAKE 1 TABLET BY MOUTH ONCE DAILY AT BEDTIME 90 Tab 2   • glucosamine Sulfate 500 MG Cap Take 500 mg by mouth 2 times a day, with meals.     • Inulin (FIBER CHOICE PO) Take 2 Caps by mouth every day.     • Iron-Vitamin C (IRON 100/C PO) Take 1 Tab by mouth every evening.     • cyanocobalamin (VITAMIN B-12) 100 MCG Tab Take 100 mcg by mouth every day.     • omeprazole (PRILOSEC) 20 MG CPDR Take 1 Cap by mouth 2 times daily, before breakfast and dinner. 30 Cap 0   • fenofibrate (TRICOR) 145 MG Tab Take 1 Tab by mouth every day. 90 Tab 1   •  "pravastatin (PRAVACHOL) 40 MG tablet Take 1 Tab by mouth every bedtime. 90 Tab 0   • VITAMIN D, CHOLECALCIFEROL, PO Take 1 Cap by mouth every day.       No current facility-administered medications for this visit.          Allergies as of 03/21/2018 - Reviewed 03/21/2018   Allergen Reaction Noted   • Nkda [no known drug allergy]  02/25/2011        /64   Pulse 85   Temp 36.7 °C (98 °F)   Resp 16   Ht 1.803 m (5' 11\")   Wt 96.2 kg (212 lb)   SpO2 94%   BMI 29.57 kg/m²       Physical Exam:  Gen:         Alert and oriented, No apparent distress.  Neck:        No Lymphadenopathy .  Lungs:     Clear to auscultation bilaterally  CV:          Regular rate and rhythm. No murmurs, rubs or gallops.   Abd:         Abdomen soft, scant tenderness to palpation across the lower abdomen, no guarding or rebound, positive bowel sounds, no hepatosplenomegaly.              Ext:          No clubbing, cyanosis, edema.    Office Visit on 03/21/2018   Component Date Value Ref Range Status   • POC Color 03/21/2018 yellow  Negative Final   • POC Appearance 03/21/2018 clear  Negative Final   • POC Leukocyte Esterase 03/21/2018 neg  Negative Final   • POC Nitrites 03/21/2018 neg  Negative Final   • POC Urobiligen 03/21/2018 0.2  Negative (0.2) mg/dL Final   • POC Protein 03/21/2018 neg  Negative mg/dL Final   • POC Urine PH 03/21/2018 5.0  5.0 - 8.0 Final   • POC Blood 03/21/2018 trace  Negative Final   • POC Specific Gravity 03/21/2018 1.010  <1.005 - >1.030 Final   • POC Ketones 03/21/2018 neg  Negative mg/dL Final   • POC Bilirubin 03/21/2018 neg  Negative mg/dL Final   • POC Glucose 03/21/2018 neg  Negative mg/dL Final     Trazodone      Assessment and Plan.   57 y.o. male with the following issues.    1. Pelvic pain    - URINALYSIS,CULTURE IF INDICATED; Future  - CHLAMYDIA/GC PCR URINE OR SWAB; Future    2. Microhematuria    - URINALYSIS,CULTURE IF INDICATED; Future      Follow-up pending results or when necessary.  We will " also schedule him a follow-up with his PCP.

## 2018-03-21 NOTE — LETTER
March 21, 2018     Rafy Morrison  [address]  [place] [address]      Dear Rafy:    Thank you for enrolling in [facility]. Please follow the instructions below to securely access your online medical record. [facility] allows you to send messages to your healthcare team, view certain test results, renew your prescriptions, schedule appointments, and more.     How Do I Sign Up?  1. In your Internet browser, go to  [email or web address]  2. Click on the Enter Code link in the Sign In box. You will see the New Member Sign Up page.  3. Enter your [facility] Access Code exactly as it appears below (case sensitive). You will not need to use this code after you’ve completed the sign-up process. If you do not sign up before the expiration date, you must request a new code.  [facility] Access Code: [email or web address]  Expires: 4/17/2018  3:47 AM    4. Enter your Email address and Date of Birth (mm/dd/yyyy) as indicated and click Submit. You will be taken to the next sign-up page.  5. Create a [facility] ID (case sensitive) . This will be your [facility] login ID and cannot be changed, so think of one that is secure and easy to remember.  6. Create a [facility] password  (case sensitive).   · Your password must be a length of at least 6 characters/digits.  · It must include at least 1 numeric.  · You can change your password at any time.  7. Enter your Password Reset Question and Answer. This can be used at a later time if you forget your password.   8. Enter your e-mail address. You will receive e-mail notification when new information is available in [facility].  9. Click Sign Up. You can now view your medical record.     Additional Information  Please contact [facility] Customer Support at [phone] for any questions . Remember, [facility] is NOT to be used for urgent needs. For medical emergencies, dial 911.          Introducing [facility]    [facility]’s Secure, Online Health Connection      [facility] now offers  convenient, online access to your healthcare team and personal health information.  [email or web address] makes managing your healthcare easier than ever, and allows you to:  • Email your healthcare provider securely and privately  • Access your test results  • Request prescription refills 24 hours a day  • View your personal medical records from home  • Schedule or change your appointments  • View your Insurance and Billing Information  • Pay bills online ? Coming soon!        Sign below to get started:  I hereby request access to [facility] application.  I agree to abide by the [email or web address] Terms and Conditions, which will be provided to me upon activating my account.       __________________________________        _________________________  Name (Please Print)          Date of Birth     __________________________________       _________________________  Signature          Primary Care Provider      _______________  Date                          *For Internal Use Only: Please scan this form into the patient’s chart. Click on  - Select Patient - Attach to Encounter:  - Document Type: Consent   - Document Description: MyChart Consent

## 2018-04-04 ENCOUNTER — TELEPHONE (OUTPATIENT)
Dept: MEDICAL GROUP | Facility: CLINIC | Age: 58
End: 2018-04-04

## 2018-04-04 DIAGNOSIS — R10.2 PELVIC PAIN: ICD-10-CM

## 2018-04-04 DIAGNOSIS — R31.29 MICROHEMATURIA: ICD-10-CM

## 2018-04-04 NOTE — TELEPHONE ENCOUNTER
I was working MA weekly in-clinic lab reports and came across a UA and C /GC that was ordered for clinic collect. I had to delete them and re-order for lab collect. May I have a them signed please? I will then prompt pt to complete them.

## 2018-04-12 ENCOUNTER — OFFICE VISIT (OUTPATIENT)
Dept: MEDICAL GROUP | Facility: PHYSICIAN GROUP | Age: 58
End: 2018-04-12
Payer: COMMERCIAL

## 2018-04-12 VITALS
SYSTOLIC BLOOD PRESSURE: 108 MMHG | WEIGHT: 200 LBS | HEIGHT: 71 IN | HEART RATE: 78 BPM | BODY MASS INDEX: 28 KG/M2 | OXYGEN SATURATION: 95 % | DIASTOLIC BLOOD PRESSURE: 62 MMHG | TEMPERATURE: 98.1 F

## 2018-04-12 DIAGNOSIS — R10.2 PELVIC PAIN: ICD-10-CM

## 2018-04-12 DIAGNOSIS — R10.32 LEFT GROIN PAIN: ICD-10-CM

## 2018-04-12 PROCEDURE — 99213 OFFICE O/P EST LOW 20 MIN: CPT | Performed by: NURSE PRACTITIONER

## 2018-04-13 NOTE — ASSESSMENT & PLAN NOTE
Patient has new constant pain over pelvic area for the past month. It came on one morning.     It can hurt worse during urination, but there is no actual dysuria. The pain is aching. There is improvement after urination.     States urine does seem darker. Otherwise denies any frequency, urgency, incontinence, or hematuria. No F/C, flank pain.     Denies any other abdominal pain, N/V, change in diarrhea baseline, constipation, or blood in stool or black stool.     UA normal 3 weeks ago. He is  with one sexual partner

## 2018-04-20 NOTE — PROGRESS NOTES
Subjective:     Chief Complaint   Patient presents with   • Pelvic Pain       HPI  Rafy Morrison is a 57 y.o. male here today for pelvic pain. With his wife today     Pelvic pain  Patient has new constant pain over pelvic area for the past month. It came on one morning.     It can hurt worse during urination, but there is no actual dysuria. The pain is aching. There is improvement after urination.     States urine does seem darker. Otherwise denies any frequency, urgency, incontinence, or hematuria. No F/C, flank pain.     Denies any other abdominal pain, N/V, change in diarrhea baseline, constipation, or blood in stool or black stool.     UA normal 3 weeks ago. He is  with one sexual partner       Diagnoses of Pelvic pain and Left groin pain were pertinent to this visit.    Allergies: Nkda [no known drug allergy]  Current medicines (including changes today)  Current Outpatient Prescriptions   Medication Sig Dispense Refill   • loratadine (CLARITIN) 10 MG Tab Take 10 mg by mouth every day.     • levothyroxine (SYNTHROID) 100 MCG Tab TAKE 1 TABLET BY MOUTH ONCE DAILY IN THE MORNING ON AN EMPTY STOMACH 90 Tab 0   • traZODone (DESYREL) 150 MG Tab TAKE 1 TABLET BY MOUTH ONCE DAILY AT BEDTIME 90 Tab 0   • therapeutic multivitamin-minerals (THERAGRAN-M) Tab Take 1 Tab by mouth every day.     • MAGNESIUM PO Take 1 Cap by mouth every evening.     • Inulin (FIBER CHOICE PO) Take 2 Caps by mouth every day.     • Iron-Vitamin C (IRON 100/C PO) Take 1 Tab by mouth every evening.     • cyanocobalamin (VITAMIN B-12) 100 MCG Tab Take 100 mcg by mouth every day.     • VITAMIN D, CHOLECALCIFEROL, PO Take 1 Cap by mouth every day.     • omeprazole (PRILOSEC) 20 MG CPDR Take 1 Cap by mouth 2 times daily, before breakfast and dinner. 30 Cap 0   • fenofibrate (TRICOR) 145 MG Tab Take 1 Tab by mouth every day. 90 Tab 1   • glucosamine Sulfate 500 MG Cap Take 500 mg by mouth 2 times a day, with meals.       No current  "facility-administered medications for this visit.        He  has a past medical history of Arthritis; Heart burn; Indigestion; Pain; Pain (5/10/12); and Unspecified disorder of thyroid.    Health Maintenance: UTD    ROS  As stated in HPI      Objective:     Blood pressure 108/62, pulse 78, temperature 36.7 °C (98.1 °F), height 1.803 m (5' 11\"), weight 90.7 kg (200 lb), SpO2 95 %. Body mass index is 27.89 kg/m².  Physical Exam:  General: Alert, oriented, in no acute distress.  Eye contact is good, speech goal directed, affect calm  CNs grossly intact.  Gross hearing intact.  Abdomen: Soft, ND, TTP over suprapubic area, and more over left groin region. No hepatosplenomegaly. Bowel sounds active.   : No visible hernia. Scrotal sac without edema, erythema, warmth, or palpable masses   Ext: no edema, normal color and temperature.   Skin: No rashes or lesions in visible areas  Gait steady.     Assessment and Plan:   Assessment/Plan:  1-2: On exam, symptoms actually appear to be more left groin pain that then pelvic pain. Urinalysis normal today, but we will culture it just to be on the safe side. Patient declined prostate exam today. We will obtain ultrasound as I am curious as to whether there is a hernia present here. Monitor for results.  1. Pelvic pain  - POCT Urinalysis  - URINE CULTURE(NEW); Future  - US-RENAL  - US-INGUINAL HERNIA; Future    2. Left groin pain  - POCT Urinalysis  - URINE CULTURE(NEW); Future  - US-RENAL  - US-INGUINAL HERNIA; Future       Follow up:  Return pending results.    Educated in proper administration of medication(s) ordered today including safety, possible SE, risks, benefits, rationale and alternatives to therapy.   Supportive care, differential diagnoses, and indications for immediate follow-up discussed with patient.    Pathogenesis of diagnosis discussed including typical length and natural progression.    Instructed to return to clinic or nearest emergency department for any " change in condition, further concerns, or worsening of symptoms.  Patient states understanding of the plan of care and discharge instructions.      Please note that this dictation was created using voice recognition software. I have made every reasonable attempt to correct obvious errors, but I expect that there are errors of grammar and possibly content that I did not discover before finalizing the note.    Followup: Return pending results. sooner should new symptoms or problems arise.

## 2018-04-23 ENCOUNTER — HOSPITAL ENCOUNTER (OUTPATIENT)
Dept: RADIOLOGY | Facility: MEDICAL CENTER | Age: 58
End: 2018-04-23
Attending: NURSE PRACTITIONER
Payer: COMMERCIAL

## 2018-04-23 DIAGNOSIS — R10.32 LEFT GROIN PAIN: ICD-10-CM

## 2018-04-23 DIAGNOSIS — R10.2 PELVIC PAIN: ICD-10-CM

## 2018-04-23 PROCEDURE — 76857 US EXAM PELVIC LIMITED: CPT

## 2018-04-23 PROCEDURE — 76775 US EXAM ABDO BACK WALL LIM: CPT

## 2018-04-25 ENCOUNTER — PATIENT MESSAGE (OUTPATIENT)
Dept: MEDICAL GROUP | Facility: PHYSICIAN GROUP | Age: 58
End: 2018-04-25

## 2018-04-25 DIAGNOSIS — R10.2 PELVIC PAIN: ICD-10-CM

## 2018-04-25 DIAGNOSIS — R10.32 LLQ PAIN: ICD-10-CM

## 2018-05-08 ENCOUNTER — HOSPITAL ENCOUNTER (OUTPATIENT)
Dept: RADIOLOGY | Facility: MEDICAL CENTER | Age: 58
End: 2018-05-08
Attending: NURSE PRACTITIONER
Payer: COMMERCIAL

## 2018-05-08 DIAGNOSIS — R10.2 PELVIC PAIN: ICD-10-CM

## 2018-05-08 DIAGNOSIS — R10.32 LLQ PAIN: ICD-10-CM

## 2018-05-08 PROCEDURE — 700117 HCHG RX CONTRAST REV CODE 255: Performed by: NURSE PRACTITIONER

## 2018-05-08 PROCEDURE — 74177 CT ABD & PELVIS W/CONTRAST: CPT

## 2018-05-08 RX ADMIN — IOHEXOL 50 ML: 240 INJECTION, SOLUTION INTRATHECAL; INTRAVASCULAR; INTRAVENOUS; ORAL at 15:11

## 2018-05-08 RX ADMIN — IOHEXOL 100 ML: 350 INJECTION, SOLUTION INTRAVENOUS at 15:11

## 2018-05-09 ENCOUNTER — PATIENT MESSAGE (OUTPATIENT)
Dept: MEDICAL GROUP | Facility: PHYSICIAN GROUP | Age: 58
End: 2018-05-09

## 2018-05-09 DIAGNOSIS — R39.89 BLADDER PAIN: ICD-10-CM

## 2018-05-18 ENCOUNTER — PATIENT MESSAGE (OUTPATIENT)
Dept: MEDICAL GROUP | Facility: PHYSICIAN GROUP | Age: 58
End: 2018-05-18

## 2018-05-18 DIAGNOSIS — M25.551 RIGHT HIP PAIN: ICD-10-CM

## 2018-05-18 DIAGNOSIS — Z96.641 HISTORY OF TOTAL RIGHT HIP REPLACEMENT: ICD-10-CM

## 2018-06-06 ENCOUNTER — PATIENT MESSAGE (OUTPATIENT)
Dept: MEDICAL GROUP | Facility: PHYSICIAN GROUP | Age: 58
End: 2018-06-06

## 2018-06-06 DIAGNOSIS — M25.551 RIGHT HIP PAIN: ICD-10-CM

## 2018-06-22 RX ORDER — LEVOTHYROXINE SODIUM 0.1 MG/1
TABLET ORAL
Qty: 90 TAB | Refills: 3 | Status: SHIPPED | OUTPATIENT
Start: 2018-06-22

## 2018-06-28 ENCOUNTER — OFFICE VISIT (OUTPATIENT)
Dept: MEDICAL GROUP | Facility: PHYSICIAN GROUP | Age: 58
End: 2018-06-28
Payer: COMMERCIAL

## 2018-06-28 VITALS
DIASTOLIC BLOOD PRESSURE: 74 MMHG | BODY MASS INDEX: 28.42 KG/M2 | HEIGHT: 71 IN | OXYGEN SATURATION: 98 % | HEART RATE: 82 BPM | WEIGHT: 203 LBS | SYSTOLIC BLOOD PRESSURE: 102 MMHG | TEMPERATURE: 98.5 F

## 2018-06-28 DIAGNOSIS — F43.10 PTSD (POST-TRAUMATIC STRESS DISORDER): ICD-10-CM

## 2018-06-28 DIAGNOSIS — F32.9 REACTIVE DEPRESSION: ICD-10-CM

## 2018-06-28 PROBLEM — F32.A DEPRESSION: Status: ACTIVE | Noted: 2018-06-28

## 2018-06-28 PROCEDURE — 99214 OFFICE O/P EST MOD 30 MIN: CPT | Performed by: NURSE PRACTITIONER

## 2018-06-28 RX ORDER — FLUOXETINE 20 MG/1
TABLET, FILM COATED ORAL
Qty: 45 TAB | Refills: 0 | Status: SHIPPED | OUTPATIENT
Start: 2018-06-28 | End: 2018-08-06 | Stop reason: SDUPTHER

## 2018-06-28 ASSESSMENT — PATIENT HEALTH QUESTIONNAIRE - PHQ9
1. LITTLE INTEREST OR PLEASURE IN DOING THINGS: 2
SUM OF ALL RESPONSES TO PHQ QUESTIONS 1-9: 11
8. MOVING OR SPEAKING SO SLOWLY THAT OTHER PEOPLE COULD HAVE NOTICED. OR THE OPPOSITE, BEING SO FIGETY OR RESTLESS THAT YOU HAVE BEEN MOVING AROUND A LOT MORE THAN USUAL: 0
4. FEELING TIRED OR HAVING LITTLE ENERGY: 2
6. FEELING BAD ABOUT YOURSELF - OR THAT YOU ARE A FAILURE OR HAVE LET YOURSELF OR YOUR FAMILY DOWN: 2
5. POOR APPETITE OR OVEREATING: 0
2. FEELING DOWN, DEPRESSED, IRRITABLE, OR HOPELESS: 1
SUM OF ALL RESPONSES TO PHQ9 QUESTIONS 1 AND 2: 3
9. THOUGHTS THAT YOU WOULD BE BETTER OFF DEAD, OR OF HURTING YOURSELF: 2
3. TROUBLE FALLING OR STAYING ASLEEP OR SLEEPING TOO MUCH: 1
7. TROUBLE CONCENTRATING ON THINGS, SUCH AS READING THE NEWSPAPER OR WATCHING TELEVISION: 1

## 2018-06-28 NOTE — ASSESSMENT & PLAN NOTE
Symptoms initially started with nightmares over the past few years after a work related injury with broken right knee, broken ankles, and  shoulder. This occurred May 2014. He is now on 19% disability, but is in constant pain related to this accident. He requested to go to see a therapist, and workman's comp denied this.   Symptoms worsened in January this year. States he has addiction that he is overcoming separate from his PTSD from his accident. This caused problems in his personal life with family members and depression worsened. This was trigger for suicide attempt in January 2018. After hospitalization he did not have a PCP therefore there was no follow-up  He continues to experience depression. He feels down, like he has let family member down. Nightmares are worsening.   Has not had any suicidal thoughts or thoughts of hurting himself since March. They do have weapons in the home, the patient reports he has never thought about weapons in the past when he has thought about suicide. He does have pain medication at home, but wife has this locked away where he cannot get to it.  In the past he was on Lexapro for a previous episode of situational depression and it made his body feel strange    NATANAEL 7 Score: 13    Patient Health Questionaire  Little interest or pleasure in doing things?: 2   Feeling down, depressed, or hopeless?: 1  Trouble falling or staying asleep, or sleeping too much? : 1  Feeling tired or having little energy? : 2  Poor appetite or overeating? : 0  Feeling bad about yourself - or that you are a failure or have let yourself or your family down? : 2  Trouble concentrating on things, such as reading the newspaper or watching television? : 1  Moving or speaking so slowly that other people could have noticed.  Or the opposite - being so fidgety or restless that you have been moving around alot more than usual. : 0  Thoughts that you would be better off dead, or of hurting yourself?:  2  Patient Health Questionnaire Score: 11    Interpretation of PHQ-9 Total Score   Score Severity   1-4 No Depression   5-9 Mild Depression   10-14 Moderate Depression   15-19 Moderately Severe Depression   20-27 Severe Depression

## 2018-06-28 NOTE — PROGRESS NOTES
Subjective:     Chief Complaint   Patient presents with   • Depression     x2 months but ongoing due to PST-       HPI  Rafy Morrison is a 58 y.o. male here today for PTSD and depression, here with his wife today     Reactive depression  Symptoms initially started with nightmares over the past few years after a work related injury with broken right knee, broken ankles, and  shoulder. This occurred May 2014. He is now on 19% disability, but is in constant pain related to this accident. He requested to go to see a therapist, and workman's comp denied this.   Symptoms worsened in January this year. States he has addiction that he is overcoming separate from his PTSD from his accident. This caused problems in his personal life with family members and depression worsened. This was trigger for suicide attempt in January 2018. After hospitalization he did not have a PCP therefore there was no follow-up  He continues to experience depression. He feels down, like he has let family member down. Nightmares are worsening.   Has not had any suicidal thoughts or thoughts of hurting himself since March. They do have weapons in the home, the patient reports he has never thought about weapons in the past when he has thought about suicide. He does have pain medication at home, but wife has this locked away where he cannot get to it.  In the past he was on Lexapro for a previous episode of situational depression and it made his body feel strange    NATANAEL 7 Score: 13    Patient Health Questionaire  Little interest or pleasure in doing things?: 2   Feeling down, depressed, or hopeless?: 1  Trouble falling or staying asleep, or sleeping too much? : 1  Feeling tired or having little energy? : 2  Poor appetite or overeating? : 0  Feeling bad about yourself - or that you are a failure or have let yourself or your family down? : 2  Trouble concentrating on things, such as reading the newspaper or watching television? : 1  Moving or  speaking so slowly that other people could have noticed.  Or the opposite - being so fidgety or restless that you have been moving around alot more than usual. : 0  Thoughts that you would be better off dead, or of hurting yourself?: 2  Patient Health Questionnaire Score: 11    Interpretation of PHQ-9 Total Score   Score Severity   1-4 No Depression   5-9 Mild Depression   10-14 Moderate Depression   15-19 Moderately Severe Depression   20-27 Severe Depression         Diagnoses of Reactive depression and PTSD (post-traumatic stress disorder) were pertinent to this visit.    Allergies: Nkda [no known drug allergy]  Current medicines (including changes today)  Current Outpatient Prescriptions   Medication Sig Dispense Refill   • fluoxetine (PROZAC) 20 MG tablet Week 1: take 0.5 tab po daily x7 days. Week 2: increase to 1 tab daily 45 Tab 0   • levothyroxine (SYNTHROID) 100 MCG Tab TAKE 1 TABLET BY MOUTH EVERY DAY IN THE MORNING ON AN EMPTY STOMACH 90 Tab 3   • loratadine (CLARITIN) 10 MG Tab Take 10 mg by mouth every day.     • traZODone (DESYREL) 150 MG Tab TAKE 1 TABLET BY MOUTH ONCE DAILY AT BEDTIME 90 Tab 0   • therapeutic multivitamin-minerals (THERAGRAN-M) Tab Take 1 Tab by mouth every day.     • MAGNESIUM PO Take 1 Cap by mouth every evening.     • fenofibrate (TRICOR) 145 MG Tab Take 1 Tab by mouth every day. 90 Tab 1   • glucosamine Sulfate 500 MG Cap Take 500 mg by mouth 2 times a day, with meals.     • Inulin (FIBER CHOICE PO) Take 2 Caps by mouth every day.     • Iron-Vitamin C (IRON 100/C PO) Take 1 Tab by mouth every evening.     • cyanocobalamin (VITAMIN B-12) 100 MCG Tab Take 100 mcg by mouth every day.     • VITAMIN D, CHOLECALCIFEROL, PO Take 1 Cap by mouth every day.     • omeprazole (PRILOSEC) 20 MG CPDR Take 1 Cap by mouth 2 times daily, before breakfast and dinner. 30 Cap 0     No current facility-administered medications for this visit.        He  has a past medical history of Arthritis; Heart  "burn; Indigestion; Pain; Pain (5/10/12); Suicide attempt (HCC); and Unspecified disorder of thyroid.    Health Maintenance: UTD    ROS  As stated in HPI      Objective:     Blood pressure 102/74, pulse 82, temperature 36.9 °C (98.5 °F), height 1.803 m (5' 11\"), weight 92.1 kg (203 lb), SpO2 98 %. Body mass index is 28.31 kg/m².  Physical Exam:  General: Alert, oriented, in no acute distress.  Eye contact is good, speech goal directed, affect calm and pleasant   CNs grossly intact.  Gait steady.     Assessment and Plan:   Assessment/Plan:  1. Reactive depression  Not controlled. Medium suicide risk. We will start with fluoxetine as this could benefit PTSD and depression with anxiety. I discussed that sertraline or Celexa may be other options to consider as well pending his response to Prozac. I am encouraged wife to remove all weapons from the home. I will see patient in 4 weeks  - fluoxetine (PROZAC) 20 MG tablet; Week 1: take 0.5 tab po daily x7 days. Week 2: increase to 1 tab daily  Dispense: 45 Tab; Refill: 0    2. PTSD (post-traumatic stress disorder)  We have discussed today that although medication can be very beneficial for PTSD, the main treatment is CBT. Have highly encouraged him to consider doing therapy for management of this. Their concern at this time is finances and whether insurance has this option to go to psychology for him. They are going to look into this over the next couple of weeks as well as speak with their [employer] and we will follow up in 4 weeks to determine whether they need any assistance from myself to get him into therapy    The total time spent seeing the patient in consultation, and formulating an action plan for this visit was 30 minutes.  Greater than 50% of this time was spent face to face counseling, discussing problems documented above, coordinating care and answering questions by the provider.        Follow up:  Return in about 4 weeks (around 7/26/2018).    Educated in proper " administration of medication(s) ordered today including safety, possible SE, risks, benefits, rationale and alternatives to therapy.   Supportive care, differential diagnoses, and indications for immediate follow-up discussed with patient.    Pathogenesis of diagnosis discussed including typical length and natural progression.    Instructed to return to clinic or nearest emergency department for any change in condition, further concerns, or worsening of symptoms.  Patient states understanding of the plan of care and discharge instructions.      Please note that this dictation was created using voice recognition software. I have made every reasonable attempt to correct obvious errors, but I expect that there are errors of grammar and possibly content that I did not discover before finalizing the note.    Followup: Return in about 4 weeks (around 7/26/2018). sooner should new symptoms or problems arise.

## 2018-06-28 NOTE — LETTER
June 28, 2018          Dear Rafy:    I have started you on fluoxetine, a medication to help with your mood. In 10-21 days, you may see improvement in sleep, appetite, and daytime energy levels. In 6 weeks, you should start to see improvement in mood and emotion control.  In the first 1-2 weeks you may experience flu-like symptoms of nausea, headache, diarrhea. This is your body's response to becoming used to the change in serotonin levels. These symptoms most often subside after 1-2 weeks. If you develop severe diarrhea, vomiting, or severe headache, please let me know.  It is very important to not discontinue the medication in the initial 6 weeks. If you do not see improvement in 6 weeks, we can discuss increasing the dose or changing the medication at this time.   The length of time we will treat you with this will be for a period of 6 months beyond the point of therapeutic response.   Antidepressants are not addictive. They do not cause tolerance; you will not require higher and higher doses once an adequate dose for you is determined.   You should not drink alcohol while taking antidepressants. It can block the effects of the medication, making them less effective. Alcohol is also a depressant, therefore this does not help your recovery from depression and/or anxiety.   Exercise is a synergistic effect. It makes your antidepressant more effective. A healthy diet, with moderate carbohydrate intake, low sugar intake, minimal caffeine, and eating every 4-6 hours, will augment your efforts to treat depression.  Good sleep hygiene will help as well, going to bed at a regular time, and sleeping 8 hours a night. You may try things such as yoga, meditation, pilates, or other cardiovascular exercise to help.   Do not stop taking this medication cold turkey. If you have a desire to stop, please contact me so we can plan a taper to decrease to avoid symptoms.   ER warning signs: If you experience any suicidal thoughts,  call 9-1-1 or go to ER immediately.        If you have any questions or concerns, please don't hesitate to call.        Sincerely,        JACINTA Valerio.    Electronically Signed

## 2018-08-02 ENCOUNTER — APPOINTMENT (OUTPATIENT)
Dept: MEDICAL GROUP | Facility: PHYSICIAN GROUP | Age: 58
End: 2018-08-02
Payer: COMMERCIAL

## 2018-08-06 DIAGNOSIS — F32.9 REACTIVE DEPRESSION: ICD-10-CM

## 2018-08-07 RX ORDER — FLUOXETINE 20 MG/1
20 TABLET, FILM COATED ORAL DAILY
Qty: 90 TAB | Refills: 3 | Status: SHIPPED | OUTPATIENT
Start: 2018-08-07 | End: 2019-03-27

## 2018-08-10 ENCOUNTER — OFFICE VISIT (OUTPATIENT)
Dept: MEDICAL GROUP | Facility: PHYSICIAN GROUP | Age: 58
End: 2018-08-10
Payer: COMMERCIAL

## 2018-08-10 VITALS
HEIGHT: 71 IN | OXYGEN SATURATION: 96 % | BODY MASS INDEX: 27.69 KG/M2 | DIASTOLIC BLOOD PRESSURE: 78 MMHG | TEMPERATURE: 98.3 F | SYSTOLIC BLOOD PRESSURE: 126 MMHG | WEIGHT: 197.8 LBS | HEART RATE: 81 BPM

## 2018-08-10 DIAGNOSIS — F51.01 PRIMARY INSOMNIA: ICD-10-CM

## 2018-08-10 DIAGNOSIS — F32.9 REACTIVE DEPRESSION: ICD-10-CM

## 2018-08-10 PROCEDURE — 99213 OFFICE O/P EST LOW 20 MIN: CPT | Performed by: NURSE PRACTITIONER

## 2018-08-10 ASSESSMENT — PATIENT HEALTH QUESTIONNAIRE - PHQ9
4. FEELING TIRED OR HAVING LITTLE ENERGY: 1
SUM OF ALL RESPONSES TO PHQ QUESTIONS 1-9: 8
2. FEELING DOWN, DEPRESSED, IRRITABLE, OR HOPELESS: 1
9. THOUGHTS THAT YOU WOULD BE BETTER OFF DEAD, OR OF HURTING YOURSELF: 0
8. MOVING OR SPEAKING SO SLOWLY THAT OTHER PEOPLE COULD HAVE NOTICED. OR THE OPPOSITE, BEING SO FIGETY OR RESTLESS THAT YOU HAVE BEEN MOVING AROUND A LOT MORE THAN USUAL: 1
5. POOR APPETITE OR OVEREATING: 0
3. TROUBLE FALLING OR STAYING ASLEEP OR SLEEPING TOO MUCH: 2
7. TROUBLE CONCENTRATING ON THINGS, SUCH AS READING THE NEWSPAPER OR WATCHING TELEVISION: 1
6. FEELING BAD ABOUT YOURSELF - OR THAT YOU ARE A FAILURE OR HAVE LET YOURSELF OR YOUR FAMILY DOWN: 1
1. LITTLE INTEREST OR PLEASURE IN DOING THINGS: 1
SUM OF ALL RESPONSES TO PHQ9 QUESTIONS 1 AND 2: 2

## 2018-08-10 NOTE — PROGRESS NOTES
"Subjective:     Chief Complaint   Patient presents with   • Depression     1 month fv       HPI  Rafy Morrison is a 58 y.o. male here today for 6 week f/u on depression. Alone today.     Reactive depression  Symptoms initially started with nightmares over the past few years after a work related injury with broken right knee, broken ankles, and  shoulder. This occurred May 2014. He is now on 19% disability, but is in constant pain related to this accident. He requested to go to see a therapist, and workman's comp denied this.   Symptoms worsened in January this year. States he has addiction that he is overcoming separate from his PTSD from his accident. This caused problems in his personal life with family members and depression worsened. This was trigger for suicide attempt in January 2018. After hospitalization he did not have a PCP therefore there was no follow-up  He presented to me 6 weeks ago with worsening nightmares and depression. There was no suicidal ideation or plan. I started him on fluoxetine, which would benefit both PTSD and depression. I had enc him to speak with his [employer] about therapy as they were concerned about finances for psychology referral for CBT.     He has been busy with school and looking for a job, so he has not spoken with his [employer] on therapy. He feels significantly better stating he is \"borderline perfect.\"  He is not having nightmares any longer. Sometimes having insomnia issues waking up about 3 am. (Still using trazodone for sleep). He is in his final 2 weeks of school and trying to line up a job. He accepted a job, and is in process of the paperwork in background check for completion.     NATANAEL 7 7 (down from 13)  PHQ 9 8 (down from 11)        Diagnoses of Reactive depression and Primary insomnia were pertinent to this visit.    Allergies: Nkda [no known drug allergy]  Current medicines (including changes today)  Current Outpatient Prescriptions   Medication Sig " "Dispense Refill   • fluoxetine (PROZAC) 20 MG tablet Take 1 Tab by mouth every day. 90 Tab 3   • levothyroxine (SYNTHROID) 100 MCG Tab TAKE 1 TABLET BY MOUTH EVERY DAY IN THE MORNING ON AN EMPTY STOMACH 90 Tab 3   • loratadine (CLARITIN) 10 MG Tab Take 10 mg by mouth every day.     • traZODone (DESYREL) 150 MG Tab TAKE 1 TABLET BY MOUTH ONCE DAILY AT BEDTIME 90 Tab 0   • therapeutic multivitamin-minerals (THERAGRAN-M) Tab Take 1 Tab by mouth every day.     • MAGNESIUM PO Take 1 Cap by mouth every evening.     • glucosamine Sulfate 500 MG Cap Take 500 mg by mouth 2 times a day, with meals.     • Inulin (FIBER CHOICE PO) Take 2 Caps by mouth every day.     • Iron-Vitamin C (IRON 100/C PO) Take 1 Tab by mouth every evening.     • cyanocobalamin (VITAMIN B-12) 100 MCG Tab Take 100 mcg by mouth every day.     • VITAMIN D, CHOLECALCIFEROL, PO Take 1 Cap by mouth every day.     • omeprazole (PRILOSEC) 20 MG CPDR Take 1 Cap by mouth 2 times daily, before breakfast and dinner. 30 Cap 0     No current facility-administered medications for this visit.        He  has a past medical history of Arthritis; Heart burn; Indigestion; Pain; Pain (5/10/12); Suicide attempt (HCC); and Unspecified disorder of thyroid.        ROS  As stated in HPI and additionally       Objective:     Blood pressure 126/78, pulse 81, temperature 36.8 °C (98.3 °F), height 1.803 m (5' 11\"), weight 89.7 kg (197 lb 12.8 oz), SpO2 96 %. Body mass index is 27.59 kg/m².  Physical Exam:  General: Alert, oriented, in no acute distress.  Eye contact is good, speech goal directed, affect calm  CNs grossly intact.  HEENT: conjunctiva non-injected, sclera non-icteric, EOMs intact.   Gross hearing intact.  Ext: no edema, normal color   Skin: No rashes or lesions in visible areas  Gait steady.     Assessment and Plan:   Assessment/Plan:  1. Reactive depression  Improving and stable. Discussed option of increasing fluoxetine, but we decided to have him complete school " and see what happens with the paperwork for the job. Once the school stressor is removed, we will see how he does over the next couple of weeks thereafter to determine if we do need to increase fluoxetine to 30 mg daily or not. I have encouraged him to still speak with his [employer] about therapy even though he is improving now, CBT is the #1 treatment     2. Primary insomnia  Borderline controlled. Continue trazodone. We will see how he is doing once the stressor of school is removed and then therefore determine if we need any further follow-up on insomnia or not       Follow up:  Return in about 6 months (around 2/10/2019).    Educated in proper administration of medication(s) ordered today including safety, possible SE, risks, benefits, rationale and alternatives to therapy.   Supportive care, differential diagnoses, and indications for immediate follow-up discussed with patient.    Pathogenesis of diagnosis discussed including typical length and natural progression.    Instructed to return to clinic or nearest emergency department for any change in condition, further concerns, or worsening of symptoms.  Patient states understanding of the plan of care and discharge instructions.      Please note that this dictation was created using voice recognition software. I have made every reasonable attempt to correct obvious errors, but I expect that there are errors of grammar and possibly content that I did not discover before finalizing the note.    Followup: Return in about 6 months (around 2/10/2019). sooner should new symptoms or problems arise.

## 2018-08-10 NOTE — ASSESSMENT & PLAN NOTE
"Symptoms initially started with nightmares over the past few years after a work related injury with broken right knee, broken ankles, and  shoulder. This occurred May 2014. He is now on 19% disability, but is in constant pain related to this accident. He requested to go to see a therapist, and workman's comp denied this.   Symptoms worsened in January this year. States he has addiction that he is overcoming separate from his PTSD from his accident. This caused problems in his personal life with family members and depression worsened. This was trigger for suicide attempt in January 2018. After hospitalization he did not have a PCP therefore there was no follow-up  He presented to me 6 weeks ago with worsening nightmares and depression. There was no suicidal ideation or plan. I started him on fluoxetine, which would benefit both PTSD and depression. I had enc him to speak with his [employer] about therapy as they were concerned about finances for psychology referral for CBT.     He has been busy with school and looking for a job, so he has not spoken with his [employer] on therapy. He feels significantly better stating he is \"borderline perfect.\"  He is not having nightmares any longer. Sometimes having insomnia issues waking up about 3 am. (Still using trazodone for sleep). He is in his final 2 weeks of school and trying to line up a job. He accepted a job, and is in process of the paperwork in background check for completion.     NATANAEL 7 7 (down from 13)  PHQ 9 8 (down from 11)   "

## 2018-11-07 ENCOUNTER — APPOINTMENT (OUTPATIENT)
Dept: RADIOLOGY | Facility: MEDICAL CENTER | Age: 58
End: 2018-11-07
Attending: EMERGENCY MEDICINE
Payer: COMMERCIAL

## 2018-11-07 ENCOUNTER — HOSPITAL ENCOUNTER (EMERGENCY)
Facility: MEDICAL CENTER | Age: 58
End: 2018-11-07
Attending: EMERGENCY MEDICINE
Payer: COMMERCIAL

## 2018-11-07 VITALS
TEMPERATURE: 98.6 F | RESPIRATION RATE: 18 BRPM | OXYGEN SATURATION: 96 % | HEART RATE: 74 BPM | SYSTOLIC BLOOD PRESSURE: 133 MMHG | HEIGHT: 71 IN | BODY MASS INDEX: 27.81 KG/M2 | WEIGHT: 198.63 LBS | DIASTOLIC BLOOD PRESSURE: 78 MMHG

## 2018-11-07 DIAGNOSIS — R60.9 DEPENDENT EDEMA: ICD-10-CM

## 2018-11-07 DIAGNOSIS — R60.9 PERIPHERAL EDEMA: ICD-10-CM

## 2018-11-07 LAB
ALBUMIN SERPL BCP-MCNC: 5.1 G/DL (ref 3.2–4.9)
ALBUMIN/GLOB SERPL: 2.7 G/DL
ALP SERPL-CCNC: 54 U/L (ref 30–99)
ALT SERPL-CCNC: 18 U/L (ref 2–50)
ANION GAP SERPL CALC-SCNC: 6 MMOL/L (ref 0–11.9)
AST SERPL-CCNC: 16 U/L (ref 12–45)
BASOPHILS # BLD AUTO: 0.5 % (ref 0–1.8)
BASOPHILS # BLD: 0.03 K/UL (ref 0–0.12)
BILIRUB SERPL-MCNC: 0.5 MG/DL (ref 0.1–1.5)
BUN SERPL-MCNC: 11 MG/DL (ref 8–22)
CALCIUM SERPL-MCNC: 9.2 MG/DL (ref 8.5–10.5)
CHLORIDE SERPL-SCNC: 105 MMOL/L (ref 96–112)
CO2 SERPL-SCNC: 27 MMOL/L (ref 20–33)
CREAT SERPL-MCNC: 1.04 MG/DL (ref 0.5–1.4)
EOSINOPHIL # BLD AUTO: 0.04 K/UL (ref 0–0.51)
EOSINOPHIL NFR BLD: 0.7 % (ref 0–6.9)
ERYTHROCYTE [DISTWIDTH] IN BLOOD BY AUTOMATED COUNT: 42.7 FL (ref 35.9–50)
GLOBULIN SER CALC-MCNC: 1.9 G/DL (ref 1.9–3.5)
GLUCOSE SERPL-MCNC: 98 MG/DL (ref 65–99)
HCT VFR BLD AUTO: 40.5 % (ref 42–52)
HGB BLD-MCNC: 13.5 G/DL (ref 14–18)
IMM GRANULOCYTES # BLD AUTO: 0.01 K/UL (ref 0–0.11)
IMM GRANULOCYTES NFR BLD AUTO: 0.2 % (ref 0–0.9)
LYMPHOCYTES # BLD AUTO: 1.08 K/UL (ref 1–4.8)
LYMPHOCYTES NFR BLD: 19.3 % (ref 22–41)
MCH RBC QN AUTO: 27.7 PG (ref 27–33)
MCHC RBC AUTO-ENTMCNC: 33.3 G/DL (ref 33.7–35.3)
MCV RBC AUTO: 83.2 FL (ref 81.4–97.8)
MONOCYTES # BLD AUTO: 0.3 K/UL (ref 0–0.85)
MONOCYTES NFR BLD AUTO: 5.4 % (ref 0–13.4)
NEUTROPHILS # BLD AUTO: 4.13 K/UL (ref 1.82–7.42)
NEUTROPHILS NFR BLD: 73.9 % (ref 44–72)
NRBC # BLD AUTO: 0 K/UL
NRBC BLD-RTO: 0 /100 WBC
PLATELET # BLD AUTO: 143 K/UL (ref 164–446)
PMV BLD AUTO: 10.1 FL (ref 9–12.9)
POTASSIUM SERPL-SCNC: 4.1 MMOL/L (ref 3.6–5.5)
PROT SERPL-MCNC: 7 G/DL (ref 6–8.2)
RBC # BLD AUTO: 4.87 M/UL (ref 4.7–6.1)
SODIUM SERPL-SCNC: 138 MMOL/L (ref 135–145)
WBC # BLD AUTO: 5.6 K/UL (ref 4.8–10.8)

## 2018-11-07 PROCEDURE — 80053 COMPREHEN METABOLIC PANEL: CPT

## 2018-11-07 PROCEDURE — 99284 EMERGENCY DEPT VISIT MOD MDM: CPT

## 2018-11-07 PROCEDURE — 93005 ELECTROCARDIOGRAM TRACING: CPT | Performed by: EMERGENCY MEDICINE

## 2018-11-07 PROCEDURE — 93971 EXTREMITY STUDY: CPT | Mod: RT

## 2018-11-07 PROCEDURE — 71045 X-RAY EXAM CHEST 1 VIEW: CPT

## 2018-11-07 PROCEDURE — 85025 COMPLETE CBC W/AUTO DIFF WBC: CPT

## 2018-11-07 RX ORDER — HYDROCHLOROTHIAZIDE 25 MG/1
25 TABLET ORAL DAILY
Qty: 10 TAB | Refills: 2 | Status: SHIPPED | OUTPATIENT
Start: 2018-11-07 | End: 2018-11-13 | Stop reason: SDUPTHER

## 2018-11-07 NOTE — LETTER
Emergency Services     November 7, 2018    Patient: Rafy Morrison   YOB: 1960   Date of Visit: 11/7/2018       To Whom It May Concern:    Rafy Morrison was seen and treated in our emergency department on 11/7/2018. He  Will need to keep his feet elevated at work as much as possible .    Sincerely,     PANTERA SANTACRUZ M.D.  [facility], EMERGENCY DEPT  Dept: [phone]

## 2018-11-07 NOTE — ED TRIAGE NOTES
Chief Complaint   Patient presents with   • Leg Swelling       Patient wheeled to room with above complaint. Patient states that last night after work his lower legs were swollen. Patient states that the swelling went down after he slept. Patient states that swelling started again after being at work. Numbness and tingling in both feet. +1 pitting edema in bilateral legs.   Patient placed back out in lobby and educated on triage process.

## 2018-11-08 LAB — EKG IMPRESSION: NORMAL

## 2018-11-08 NOTE — DISCHARGE INSTRUCTIONS
Edema     Elevate the legs when not walking.  Wear compression stockings.  Elevate the foot of the bed one inch if you do not have heartburn.  Minimize salt intake.  Take hydrochlorothiazide for 10 days.    You had a borderline or high normal blood pressure reading today.  This does not necessarily mean you have hypertension.  Please followup with your/a primary physician for comprehensive blood pressure evaluation and yearly fasting cholesterol assessment.  BP Readings from Last 3 Encounters:   11/07/18 143/92   08/10/18 126/78   06/28/18 102/74         Edema is an abnormal build-up of fluids in tissues. Because this is partly dependent on gravity (water flows to the lowest place), it is more common in the lower extremities (legs and thighs). It is also common in the looser tissues, like around the eyes. Painless swelling of the feet and ankles is common and increases as a person ages. It may affect both legs and may include the calves or even thighs. When squeezed, the fluid may move out of the affected area and may leave a dent for a few moments.      CAUSES  Ø Prolonged standing or sitting in one place for extended periods of time. Movement helps pump tissue fluid into the veins, and absence of movement prevents this, resulting in edema.   Ø Varicose veins. The valves in the veins do not work as well as they should. This causes fluid to leak into the tissues.  Ø Fluid and salt overload.  Ø Injury, burn, or surgery to the leg, ankle, or foot, may damage veins and allow fluid to leak out.  Ø Sunburn damages vessels. Leaky vessels allow fluid to go out into the sunburned tissues.  Ø Allergies (from insect bites or stings, medications or chemicals) cause swelling by allowing vessels to become leaky.  Ø Protein in the blood helps keep fluid in your vessels. Low protein, as in malnutrition, allows fluid to leak out.   Ø Hormonal changes, including pregnancy and menstruation, cause fluid retention. This fluid may leak  out of vessels and cause edema.  Ø Medications that cause fluid retention. Examples are sex hormones, blood pressure medications, steroid treatment, or anti-depressants.  Ø Some illnesses cause edema, especially congestive heart failure, kidney disease, or liver disease.   Ø Surgery that cuts veins or lymph nodes, such as surgery done for the heart or for breast cancer, may result in edema.      DIAGNOSIS  Your caregiver is usually easily able to determine what is causing your swelling (edema) by simply asking what is wrong (getting a history) and examining you (doing a physical). Sometimes x-rays, EKG (electrocardiogram or heart tracing), and blood work may be done to evaluate for underlying medical illness.     TREATMENT  General treatment includes:  Ø Leg elevation (or elevation of the affected body part).  Ø Restriction of fluid intake.   Ø Prevention of fluid overload.   Ø Compression of the affected body part. Compression with elastic bandages or support stockings squeezes the tissues, preventing fluid from entering and forcing it back into the blood vessels.   Ø Diuretics (also called water pills or fluid pills) pull fluid out of your body in the form of increased urination. These are effective in reducing the swelling, but can have side effects and must be used only under your caregiver?s supervision. Diuretics are appropriate only for some types of edema.     The specific treatment can be directed at any underlying causes discovered. Heart, liver, or kidney disease should be treated appropriately.      HOME CARE INSTRUCTIONS  Ø Elevate the legs (or affected body part) above the level of the heart, while lying down.  Ø Avoid sitting or standing still for prolonged periods of time.  Ø Avoid putting anything directly under the knees when lying down, and do not wear constricting clothing or garters on the upper legs.  Ø Exercising the legs causes the fluid to work back into the veins and lymphatic channels.  This may help the swelling go down.  Ø The pressure applied by elastic bandages or support stockings can help reduce ankle swelling.  Ø A low-salt diet may help reduce fluid retention and decrease the ankle swelling.  Ø Take any medications exactly as prescribed.     SEEK MEDICAL CARE IF:  Ø Your edema is not responding to recommended treatments.     SEEK IMMEDIATE MEDICAL CARE IF:  Ø You develop shortness of breath or chest pain.  Ø You cannot breathe when you lay down; or if, while lying down, you have to get up and go to the window to get your breath.  Ø You are having increasing swelling without relief from treatment.  Ø You develop a fever over 102°F (38.9°C).  Ø You develop pain or redness in the areas that are swollen.  Ø You notice a  weight gain of five pounds or more over several days.     Document Released: 04/07/2004  Document Re-Released: 10/14/2008  [employer]® Patient Information ©2009 [employer], [place].

## 2018-11-08 NOTE — ED NOTES
Discharge orders received from ERP. New prescriptions received x 1. Provided education and patient demonstrated understanding. Pt ambulatory off unit. All personal belonging with patient.   Pt given work note.  Pt understands need for follow up.

## 2018-11-08 NOTE — ED PROVIDER NOTES
ED Provider Note    Scribed for Walter Rosen M.D. by Santiago Keyes. 11/7/2018  4:22 PM    Primary care provider: GAUTAM Valerio  Means of arrival: Wheel chair  History obtained from: Patient  History limited by: None    CHIEF COMPLAINT  Chief Complaint   Patient presents with   • Leg Swelling       HPI  Rafy Morrison is a 58 y.o. male who presents to the Emergency Department complaining of bilateral leg swelling and associated pain onset yesterday evening. His swelling resolved after lying down for a while, but the swelling returned this morning and has remained constant. He states he stands and sits frequently at work. He had a subtalar effusion in his left foot 4.5 years ago after tibial plateau fracture. He denies any coughing, hemoptysis, shortness of breath, abdominal pain, chest pain, or other symptoms. He denies any history of heart failure, kidney disease, varicose veins, or cirrhosis. He states he takes Synthroid, Trazodone, Prozac, and is taking a course of Amoxicillin secondary to a root canal last week.    REVIEW OF SYSTEMS  Pertinent positives include: bilateral leg swelling and pain.  Pertinent negatives include: No coughing, hemoptysis, shortness of breath, abdominal pain, or chest pain.  10+ systems reviewed and negative.      PAST MEDICAL HISTORY  Past Medical History:   Diagnosis Date   • Arthritis    • Heart burn    • Indigestion    • Pain     right hip   • Pain 5/10/12    left hip   • Suicide attempt (HCC)    • Unspecified disorder of thyroid     hypothyroid       FAMILY HISTORY  Family History   Problem Relation Age of Onset   • No Known Problems Mother    • Kidney Disease Father         kidney failure on dialysis   • Cancer Maternal Grandmother    • No Known Problems Maternal Grandfather    • No Known Problems Paternal Grandmother    • No Known Problems Paternal Grandfather    • Diabetes Unknown    • Hypertension Unknown    • Cancer Unknown    • Diabetes Sister         type 1   •  GI Sister         celiac disease   • Cancer Paternal Uncle         ?       SOCIAL HISTORY  Social History   Substance Use Topics   • Smoking status: Never Smoker   • Smokeless tobacco: Never Used   • Alcohol use No     History   Drug Use No       SURGICAL HISTORY  Past Surgical History:   Procedure Laterality Date   • ANKLE ORIF  5/27/2014    Performed by Ian Escalera M.D. at SURGERY [facility]   • TIBIA PLATEAU ORIF  5/24/2014    Performed by Ian Escalera M.D. at SURGERY [facility]   • HIP ARTHROSCOPY  5/18/2012    Performed by MERARY SPENCER at SURGERY [facility]   • ACETABULAR OSTEOTOMY  5/18/2012    Performed by MERARY SPENCER at SURGERY [facility]   • SYNOVECTOMY  5/18/2012    Performed by MERARY SPENCER at [facility]   • HIP ARTHROPLASTY TOTAL  8/15/2011    Performed by MERARY SPENCER at [facility]   • RECOVERY  8/8/2011    Performed by SURGERY, IR-RECOVERY at SURGERY SAME DAY [facility]   • HIP ARTHROSCOPY  3/9/2011    right; Performed by MERARY SPENCER at SURGERY [facility]   • KNEE ARTHROSCOPY  6/2010    right   • DEBRIDEMENT  11/23/2009    right   • HIP ARTHROSCOPY  11/23/2009    right   • HAND SURGERY  2007    cyst excision and debridement left hand   • HERNIA REP ING BILAT  2005    and umbilical hernia repair   • ROTATOR CUFF REPAIR  2004    left   • NERVE ULNAR TRANSFER  2001    right   • OTHER ORTHOPEDIC SURGERY  1979    left hand and small ring finger       CURRENT MEDICATIONS  Home Medications     Reviewed by Sandra Dougherty R.N. (Registered Nurse) on 11/07/18 at 1327  Med List Status: Complete   Medication Last Dose Status   cyanocobalamin (VITAMIN B-12) 100 MCG Tab 11/7/2018 Active   fluoxetine (PROZAC) 20 MG tablet 11/6/2018 Active   glucosamine Sulfate 500 MG Cap 11/7/2018 Active   Inulin (FIBER CHOICE PO) 11/7/2018 Active   Iron-Vitamin C (IRON 100/C PO) 11/6/2018 Active   levothyroxine (SYNTHROID) 100 MCG Tab  "11/7/2018 Active   loratadine (CLARITIN) 10 MG Tab 11/7/2018 Active   MAGNESIUM PO not taking Active   omeprazole (PRILOSEC) 20 MG CPDR 11/7/2018 Active   therapeutic multivitamin-minerals (THERAGRAN-M) Tab 11/7/2018 Active   traZODone (DESYREL) 150 MG Tab 11/6/2018 Active   VITAMIN D, CHOLECALCIFEROL, PO 11/7/2018 Active                ALLERGIES  Allergies   Allergen Reactions   • Nkda [No Known Drug Allergy]        PHYSICAL EXAM  VITAL SIGNS: /92   Pulse 73   Temp 37.1 °C (98.8 °F)   Resp 19   Ht 1.803 m (5' 11\")   Wt 90.1 kg (198 lb 10.2 oz)   SpO2 96%   BMI 27.70 kg/m²   Reviewed and elevated blood pressure  Constitutional: Well developed, Well nourished.  HENT: Normocephalic, atraumatic, bilateral external ears normal, oropharynx moist, No exudates or erythema.   Eyes: PERRLA, conjunctiva pink, no scleral icterus.   Cardiovascular: Regular rate and rhythm. No murmurs, rubs or gallops.  Respiratory: Lungs clear to auscultation bilaterally. No wheezes, rales, or rhonchi.  Abdominal:  Abdomen soft, non-tender, non distended. No rebound, or guarding.   Skin: No erythema, no rash.   Genitourinary: No costovertebral angle tenderness.   Musculoskeletal: 2+ right, 1+ left pitting edema.   Neurologic: Alert & oriented x 3, cranial nerves 2-12 intact by passive exam.  No focal deficit noted.  Psychiatric: Affect normal, Judgment normal, Mood normal.     DIFFERENTIAL DIAGNOSIS:  DVT, low albumin, cirrhosis.    EKG Interpretation:  Interpreted by me    Rhythm:  Normal sinus rhythm   Rate: 64  Axis: normal  Ectopy: none  Conduction: normal  ST Segments: no acute change  T Waves: no acute change  Q Waves: none  Clinical Impression: Normal EKG without acute changes      RADIOLOGY/PROCEDURES  US-EXTREMITY VENOUS LOWER UNILAT RIGHT   Final Result      DX-CHEST-LIMITED (1 VIEW)   Final Result      No acute cardiopulmonary disease. No evidence of volume overload or congestive failure.      Ultrasound negative for " DVT  Radiologist interpretation have been reviewed by me.     LABORATORY:  Results for orders placed or performed during the hospital encounter of 11/07/18   CBC WITH DIFFERENTIAL   Result Value Ref Range    WBC 5.6 4.8 - 10.8 K/uL    RBC 4.87 4.70 - 6.10 M/uL    Hemoglobin 13.5 (L) 14.0 - 18.0 g/dL    Hematocrit 40.5 (L) 42.0 - 52.0 %    MCV 83.2 81.4 - 97.8 fL    MCH 27.7 27.0 - 33.0 pg    MCHC 33.3 (L) 33.7 - 35.3 g/dL    RDW 42.7 35.9 - 50.0 fL    Platelet Count 143 (L) 164 - 446 K/uL    MPV 10.1 9.0 - 12.9 fL    Neutrophils-Polys 73.90 (H) 44.00 - 72.00 %    Lymphocytes 19.30 (L) 22.00 - 41.00 %    Monocytes 5.40 0.00 - 13.40 %    Eosinophils 0.70 0.00 - 6.90 %    Basophils 0.50 0.00 - 1.80 %    Immature Granulocytes 0.20 0.00 - 0.90 %    Nucleated RBC 0.00 /100 WBC    Neutrophils (Absolute) 4.13 1.82 - 7.42 K/uL    Lymphs (Absolute) 1.08 1.00 - 4.80 K/uL    Monos (Absolute) 0.30 0.00 - 0.85 K/uL    Eos (Absolute) 0.04 0.00 - 0.51 K/uL    Baso (Absolute) 0.03 0.00 - 0.12 K/uL    Immature Granulocytes (abs) 0.01 0.00 - 0.11 K/uL    NRBC (Absolute) 0.00 K/uL   COMP METABOLIC PANEL   Result Value Ref Range    Sodium 138 135 - 145 mmol/L    Potassium 4.1 3.6 - 5.5 mmol/L    Chloride 105 96 - 112 mmol/L    Co2 27 20 - 33 mmol/L    Anion Gap 6.0 0.0 - 11.9    Glucose 98 65 - 99 mg/dL    Bun 11 8 - 22 mg/dL    Creatinine 1.04 0.50 - 1.40 mg/dL    Calcium 9.2 8.5 - 10.5 mg/dL    AST(SGOT) 16 12 - 45 U/L    ALT(SGPT) 18 2 - 50 U/L    Alkaline Phosphatase 54 30 - 99 U/L    Total Bilirubin 0.5 0.1 - 1.5 mg/dL    Albumin 5.1 (H) 3.2 - 4.9 g/dL    Total Protein 7.0 6.0 - 8.2 g/dL    Globulin 1.9 1.9 - 3.5 g/dL    A-G Ratio 2.7 g/dL      Lab results reviewed by me.         ED COURSE:    4:22 PM - Patient seen and examined at bedside. Ordered US Venous Bilateral Lower Extremities, X-ray Chest, Estimated GFR, CBC, CMP to evaluate.     6:56 PM Patient re-evaluated at bedside. Patient reports feeling improved. Discussed  patient's lab and imaging results, which do not indicate any sign of a clot or other acute processes. Patient is stable for discharge at this time with prescription for Hydrodiuril. Patient given strict return precautions with any new or worsening symptoms. Patient understands and agrees to plan of care and discharge at this time.        MEDICAL DECISION MAKING:  This patient presents with right greater than left leg edema and a history of trauma to the right leg.  DVT needed to be excluded and fortunately is not present.  There is no evidence of low albumin, cirrhosis, nephrotic syndrome or CHF.  This is likely dependent edema possibly related to venous insufficiency.    The patient is referred to a primary physician for blood pressure management, diabetic screening, and for all other preventive health concerns.     PLAN:  New Prescriptions    HYDROCHLOROTHIAZIDE (HYDRODIURIL) 25 MG TAB    Take 1 Tab by mouth every day.     Leg elevation  Compression stockings  Edema handout given  Return for shortness of breath, chest pain    KAMILA ValerioP.RKiaraN.  [address] [id number] [phone]    Schedule an appointment as soon as possible for a visit   As needed if not better one week      CONDITION: Stable.     FINAL IMPRESSION  1. Dependent edema          Santiago MOHAN (Scribe), am scribing for, and in the presence of, Walter Rosen M.D..    Electronically signed by: Santiago Keyes (Scribe), 11/7/2018    Walter MOHAN M.D. personally performed the services described in this documentation, as scribed by Santiago Keyes in my presence, and it is both accurate and complete. C.    The note accurately reflects work and decisions made by me.  Walter Rosen  11/7/2018  7:48 PM

## 2018-11-13 ENCOUNTER — OFFICE VISIT (OUTPATIENT)
Dept: MEDICAL GROUP | Facility: PHYSICIAN GROUP | Age: 58
End: 2018-11-13
Payer: COMMERCIAL

## 2018-11-13 VITALS
OXYGEN SATURATION: 96 % | BODY MASS INDEX: 26.18 KG/M2 | WEIGHT: 187 LBS | TEMPERATURE: 97.5 F | SYSTOLIC BLOOD PRESSURE: 124 MMHG | HEIGHT: 71 IN | DIASTOLIC BLOOD PRESSURE: 86 MMHG | HEART RATE: 83 BPM

## 2018-11-13 DIAGNOSIS — R60.0 BILATERAL LOWER EXTREMITY EDEMA: ICD-10-CM

## 2018-11-13 DIAGNOSIS — E03.9 HYPOTHYROIDISM, ACQUIRED: ICD-10-CM

## 2018-11-13 DIAGNOSIS — Z09 HOSPITAL DISCHARGE FOLLOW-UP: ICD-10-CM

## 2018-11-13 DIAGNOSIS — E78.00 PURE HYPERCHOLESTEROLEMIA: ICD-10-CM

## 2018-11-13 PROCEDURE — 99214 OFFICE O/P EST MOD 30 MIN: CPT | Performed by: NURSE PRACTITIONER

## 2018-11-13 RX ORDER — HYDROCHLOROTHIAZIDE 25 MG/1
25 TABLET ORAL
Qty: 30 TAB | Refills: 3 | Status: SHIPPED | OUTPATIENT
Start: 2018-11-13 | End: 2019-03-27

## 2018-11-13 NOTE — LETTER
November 13, 2018         Patient: Rafy Morrison   YOB: 1960   Date of Visit: 11/13/2018           To Whom it May Concern:    Rafy Morrison was seen in my clinic on 11/13/2018. He has chronic medical condition in his lower extremities that require frequent elevation of legs to prevent dependent edema.  I understand that patient is sitting for most of his time, so please make accommodations to allow for him to elevate both legs while seated to prevent worsening of this chronic condition.    If you have any questions or concerns, please don't hesitate to call.        Sincerely,           JACINTA Valerio.  Electronically Signed

## 2018-11-13 NOTE — PROGRESS NOTES
Subjective:     Chief Complaint   Patient presents with   • Hospital Follow-up     x1week //swelling in both legs        HPI  Rafy Morrison is a 58 y.o. male here today with his wife for f/u from ER.    Seen in [facility] ER 11/7/18 for bilateral leg swelling and associated pain onset 11/6. His swelling resolved after lying down for a while, but the swelling returned the following morning and remained constant. RLE edema > LLE edema, therefore US completed to r/o DVT. There was no evidence on w/u r/t low albumin, cirrhosis, nephrotic syndrome, or CHF. Chest x-ray was WNL. Provider assumed this was dependent edema r/t venous insufficiency. He was started on hctz daily, enc to elevate, and given compression stockings. Swelling has resolved.   He used to have legs up a lot, but his new desk did not allow for room to elevate legs. He has been wearing compression stockings at work, but not at home because he does elevate more.  Working 8-10 hour work days. There is more sitting than walking or standing. He knows that he needs to get up and walk around at least     Has hx of dyslipidemia managed with no mediation at this time. Due for annual labs.    Ref. Range 11/23/2016 06:15 6/24/2017 08:28   Cholesterol,Tot Latest Ref Range: 100 - 199 mg/dL 181 157   Triglycerides Latest Ref Range: 0 - 149 mg/dL 268 (H) 277 (H)   HDL Latest Ref Range: >=40 mg/dL 33 (A) 33 (A)   LDL Latest Ref Range: <100 mg/dL 94 69     On levothyroxine for replacement   Ref. Range 6/24/2017 08:28 1/30/2018 01:53   TSH Latest Ref Range: 0.380 - 5.330 uIU/mL 0.570 2.840   Free T-4 Latest Ref Range: 0.53 - 1.43 ng/dL 0.88        Diagnoses of Hospital discharge follow-up, Bilateral lower extremity edema, Pure hypercholesterolemia, and Hypothyroidism, acquired were pertinent to this visit.    Allergies: Nkda [no known drug allergy]  Current medicines (including changes today)  Current Outpatient Prescriptions   Medication Sig Dispense Refill   •  "hydroCHLOROthiazide (HYDRODIURIL) 25 MG Tab Take 1 Tab by mouth 1 time daily as needed (leg edema). 30 Tab 3   • fluoxetine (PROZAC) 20 MG tablet Take 1 Tab by mouth every day. 90 Tab 3   • levothyroxine (SYNTHROID) 100 MCG Tab TAKE 1 TABLET BY MOUTH EVERY DAY IN THE MORNING ON AN EMPTY STOMACH 90 Tab 3   • loratadine (CLARITIN) 10 MG Tab Take 10 mg by mouth every day.     • traZODone (DESYREL) 150 MG Tab TAKE 1 TABLET BY MOUTH ONCE DAILY AT BEDTIME 90 Tab 0   • therapeutic multivitamin-minerals (THERAGRAN-M) Tab Take 1 Tab by mouth every day.     • glucosamine Sulfate 500 MG Cap Take 500 mg by mouth 2 times a day, with meals.     • Inulin (FIBER CHOICE PO) Take 2 Caps by mouth every day.     • Iron-Vitamin C (IRON 100/C PO) Take 1 Tab by mouth every evening.     • cyanocobalamin (VITAMIN B-12) 100 MCG Tab Take 100 mcg by mouth every day.     • VITAMIN D, CHOLECALCIFEROL, PO Take 1 Cap by mouth every day.     • omeprazole (PRILOSEC) 20 MG CPDR Take 1 Cap by mouth 2 times daily, before breakfast and dinner. 30 Cap 0   • MAGNESIUM PO Take 1 Cap by mouth every evening.       No current facility-administered medications for this visit.        He  has a past medical history of Arthritis; Heart burn; Indigestion; Pain; Pain (5/10/12); Suicide attempt (HCC); and Unspecified disorder of thyroid.    ROS  As stated in HPI and additionally  Gen: No weight gain or fatigue  Neuro: No unusual HA or dizziness  CV: No chest pain, SIGALA, near-syncope, PND, nocturia  Pulm: No sob ror dyspnea     Objective:     Blood pressure 124/86, pulse 83, temperature 36.4 °C (97.5 °F), temperature source Temporal, height 1.803 m (5' 11\"), weight 84.8 kg (187 lb), SpO2 96 %. Body mass index is 26.08 kg/m².  Physical Exam:  General: Alert, oriented, in no acute distress.  Eye contact is good, speech goal directed, affect calm  CNs grossly intact.  HEENT: conjunctiva non-injected, sclera non-icteric, EOMs intact.    Gross hearing intact.  Neck: " Supple. No adenopathy or masses in the cervical or supraclavicular regions. No thyromegaly  Lungs: unlabored. clear to auscultation bilaterally with good excursion.  CV: regular rate and rhythm. No murmurs. No carotid bruits.   Ext: no edema, normal color and temperature.   Skin: No rashes or lesions in visible areas  Gait steady.     Assessment and Plan:   Assessment/Plan:  1. Hospital discharge follow-up  Stable. I have reviewed all hospital records including lab results, imaging studies, EKG, progress notes, and admission and discharge summaries. I have discussed with patient possible factors that contributed to hospitalization and how we can prevent patient from ending up in the hospital again. Educated patient on s/sx to observe for and what action to take when these occur. Reviewed current medications and educated on importance of compliance with these medications all appropriate follow-up visits are scheduled.     2. Bilateral lower extremity edema  Stable. Continue hctz on PRN basis. Elevate LE while seated. Letter provided for his place of work. Use compression stockings when prolonged standing or sitting will occur  - hydroCHLOROthiazide (HYDRODIURIL) 25 MG Tab; Take 1 Tab by mouth 1 time daily as needed (leg edema).  Dispense: 30 Tab; Refill: 3    3. Pure hypercholesterolemia  Check labs in January   - Lipid Profile; Future    4. Hypothyroidism, acquired  Check labs in January   - TSH WITH REFLEX TO FT4; Future       Follow up:  Return in about 8 months (around 7/13/2019).    Educated in proper administration of medication(s) ordered today including safety, possible SE, risks, benefits, rationale and alternatives to therapy.   Supportive care, differential diagnoses, and indications for immediate follow-up discussed with patient.    Pathogenesis of diagnosis discussed including typical length and natural progression.    Instructed to return to clinic or nearest emergency department for any change in  condition, further concerns, or worsening of symptoms.  Patient states understanding of the plan of care and discharge instructions.      Please note that this dictation was created using voice recognition software. I have made every reasonable attempt to correct obvious errors, but I expect that there are errors of grammar and possibly content that I did not discover before finalizing the note.    Followup: Return in about 8 months (around 7/13/2019). sooner should new symptoms or problems arise.

## 2018-12-28 ENCOUNTER — PATIENT MESSAGE (OUTPATIENT)
Dept: MEDICAL GROUP | Facility: PHYSICIAN GROUP | Age: 58
End: 2018-12-28

## 2019-01-02 ENCOUNTER — PATIENT MESSAGE (OUTPATIENT)
Dept: MEDICAL GROUP | Facility: PHYSICIAN GROUP | Age: 59
End: 2019-01-02

## 2019-01-02 DIAGNOSIS — M25.562 CHRONIC PAIN OF BOTH KNEES: ICD-10-CM

## 2019-01-02 DIAGNOSIS — M25.552 BILATERAL HIP PAIN: ICD-10-CM

## 2019-01-02 DIAGNOSIS — M54.50 CHRONIC LOW BACK PAIN WITHOUT SCIATICA, UNSPECIFIED BACK PAIN LATERALITY: ICD-10-CM

## 2019-01-02 DIAGNOSIS — G89.29 CHRONIC PAIN OF BOTH KNEES: ICD-10-CM

## 2019-01-02 DIAGNOSIS — M25.561 CHRONIC PAIN OF BOTH KNEES: ICD-10-CM

## 2019-01-02 DIAGNOSIS — M25.551 BILATERAL HIP PAIN: ICD-10-CM

## 2019-01-02 DIAGNOSIS — G89.29 CHRONIC LOW BACK PAIN WITHOUT SCIATICA, UNSPECIFIED BACK PAIN LATERALITY: ICD-10-CM

## 2019-01-26 ENCOUNTER — HOSPITAL ENCOUNTER (OUTPATIENT)
Dept: LAB | Facility: MEDICAL CENTER | Age: 59
End: 2019-01-26
Attending: NURSE PRACTITIONER
Payer: COMMERCIAL

## 2019-01-26 DIAGNOSIS — E03.9 HYPOTHYROIDISM, ACQUIRED: ICD-10-CM

## 2019-01-26 DIAGNOSIS — E78.00 PURE HYPERCHOLESTEROLEMIA: ICD-10-CM

## 2019-01-26 LAB
CHOLEST SERPL-MCNC: 172 MG/DL (ref 100–199)
FASTING STATUS PATIENT QL REPORTED: NORMAL
HDLC SERPL-MCNC: 34 MG/DL
LDLC SERPL CALC-MCNC: 64 MG/DL
TRIGL SERPL-MCNC: 369 MG/DL (ref 0–149)
TSH SERPL DL<=0.005 MIU/L-ACNC: 0.59 UIU/ML (ref 0.38–5.33)

## 2019-01-26 PROCEDURE — 80061 LIPID PANEL: CPT

## 2019-01-26 PROCEDURE — 36415 COLL VENOUS BLD VENIPUNCTURE: CPT

## 2019-01-26 PROCEDURE — 84443 ASSAY THYROID STIM HORMONE: CPT

## 2019-02-13 ENCOUNTER — OFFICE VISIT (OUTPATIENT)
Dept: MEDICAL GROUP | Facility: PHYSICIAN GROUP | Age: 59
End: 2019-02-13
Payer: COMMERCIAL

## 2019-02-13 VITALS
SYSTOLIC BLOOD PRESSURE: 132 MMHG | HEIGHT: 71 IN | OXYGEN SATURATION: 95 % | WEIGHT: 200 LBS | TEMPERATURE: 98.1 F | DIASTOLIC BLOOD PRESSURE: 82 MMHG | HEART RATE: 80 BPM | BODY MASS INDEX: 28 KG/M2

## 2019-02-13 DIAGNOSIS — F51.01 PRIMARY INSOMNIA: ICD-10-CM

## 2019-02-13 DIAGNOSIS — E78.00 PURE HYPERCHOLESTEROLEMIA: ICD-10-CM

## 2019-02-13 DIAGNOSIS — K21.9 GASTROESOPHAGEAL REFLUX DISEASE WITHOUT ESOPHAGITIS: ICD-10-CM

## 2019-02-13 PROCEDURE — 99213 OFFICE O/P EST LOW 20 MIN: CPT | Performed by: NURSE PRACTITIONER

## 2019-02-13 RX ORDER — PRAVASTATIN SODIUM 40 MG
40 TABLET ORAL
Qty: 90 TAB | Refills: 3 | Status: SHIPPED | OUTPATIENT
Start: 2019-02-13

## 2019-02-13 RX ORDER — TRAZODONE HYDROCHLORIDE 150 MG/1
TABLET ORAL
Qty: 90 TAB | Refills: 3 | Status: ON HOLD | OUTPATIENT
Start: 2019-02-13 | End: 2021-04-07

## 2019-02-13 RX ORDER — OMEPRAZOLE 20 MG/1
20 CAPSULE, DELAYED RELEASE ORAL
Qty: 180 CAP | Refills: 3 | Status: SHIPPED | OUTPATIENT
Start: 2019-02-13

## 2019-02-14 NOTE — ASSESSMENT & PLAN NOTE
Chronic issue. Not controlled. On pravastatin and fenofibrate before until insurance stopped covering this. Fish oil caused excessive burping. Often drinking 32 ounces/day. Not exercising.    Ref. Range 1/26/2019 07:44   Cholesterol,Tot Latest Ref Range: 100 - 199 mg/dL 172   Triglycerides Latest Ref Range: 0 - 149 mg/dL 369 (H)   HDL Latest Ref Range: >=40 mg/dL 34 (A)   LDL Latest Ref Range: <100 mg/dL 64

## 2019-02-14 NOTE — ASSESSMENT & PLAN NOTE
Long-standing problem that has been managed with omeprazole 20 mg BID for many years. It is working well for him. He did have EGD in the past but cannot remember if there was esophagitis.

## 2019-02-14 NOTE — PROGRESS NOTES
Subjective:     Chief Complaint   Patient presents with   • Medication Management     med refill//lab review       HPI  Rafy Morrison is a 58 y.o. male here today for lab review     Pure hypercholesterolemia  Chronic issue. Not controlled. On pravastatin and fenofibrate before until insurance stopped covering this. Fish oil caused excessive burping. Often drinking 32 ounces/day. Not exercising.    Ref. Range 1/26/2019 07:44   Cholesterol,Tot Latest Ref Range: 100 - 199 mg/dL 172   Triglycerides Latest Ref Range: 0 - 149 mg/dL 369 (H)   HDL Latest Ref Range: >=40 mg/dL 34 (A)   LDL Latest Ref Range: <100 mg/dL 64       Gastroesophageal reflux disease without esophagitis  Long-standing problem that has been managed with omeprazole 20 mg BID for many years. It is working well for him. He did have EGD in the past but cannot remember if there was esophagitis.     Primary insomnia  Long-standing problem managed with trazodone 150 mg every bedtime, which works well for him.        Diagnoses of Pure hypercholesterolemia, Gastroesophageal reflux disease without esophagitis, and Primary insomnia were pertinent to this visit.    Allergies: Nkda [no known drug allergy]  Current medicines (including changes today)  Current Outpatient Prescriptions   Medication Sig Dispense Refill   • pravastatin (PRAVACHOL) 40 MG tablet Take 1 Tab by mouth every bedtime. 90 Tab 3   • traZODone (DESYREL) 150 MG Tab TAKE 1 TABLET BY MOUTH ONCE DAILY AT BEDTIME 90 Tab 3   • omeprazole (PRILOSEC) 20 MG delayed-release capsule Take 1 Cap by mouth 2 times daily, before breakfast and dinner. 180 Cap 3   • hydroCHLOROthiazide (HYDRODIURIL) 25 MG Tab Take 1 Tab by mouth 1 time daily as needed (leg edema). 30 Tab 3   • fluoxetine (PROZAC) 20 MG tablet Take 1 Tab by mouth every day. 90 Tab 3   • levothyroxine (SYNTHROID) 100 MCG Tab TAKE 1 TABLET BY MOUTH EVERY DAY IN THE MORNING ON AN EMPTY STOMACH 90 Tab 3   • loratadine (CLARITIN) 10 MG Tab Take 10 mg  "by mouth every day.     • therapeutic multivitamin-minerals (THERAGRAN-M) Tab Take 1 Tab by mouth every day.     • MAGNESIUM PO Take 1 Cap by mouth every evening.     • glucosamine Sulfate 500 MG Cap Take 500 mg by mouth 2 times a day, with meals.     • Inulin (FIBER CHOICE PO) Take 2 Caps by mouth every day.     • Iron-Vitamin C (IRON 100/C PO) Take 1 Tab by mouth every evening.     • cyanocobalamin (VITAMIN B-12) 100 MCG Tab Take 100 mcg by mouth every day.     • VITAMIN D, CHOLECALCIFEROL, PO Take 1 Cap by mouth every day.       No current facility-administered medications for this visit.        He  has a past medical history of Arthritis; Depression; Heart burn; Indigestion; Pain; Pain (5/10/12); Suicide attempt (HCC); and Unspecified disorder of thyroid.      ROS  As stated in HPI   Gen: No weight loss or malaise   GI: No hoarseness, dysphagia, globus sensation, epigastric pain      Objective:     Blood pressure 132/82, pulse 80, temperature 36.7 °C (98.1 °F), temperature source Temporal, height 1.803 m (5' 11\"), weight 90.7 kg (200 lb), SpO2 95 %. Body mass index is 27.89 kg/m².  Physical Exam:  General: Alert, oriented, in no acute distress.  Eye contact is good, speech goal directed, affect calm  CNs grossly intact.  HEENT: conjunctiva non-injected, sclera non-icteric, EOMs intact.   Gross hearing intact.  Ext: no edema  Gait steady.     Assessment and Plan:   Assessment/Plan:  1. Pure hypercholesterolemia  Not controlled. Start pravastatin. Enc trial of freezing fish oil to prevent fish burps. Lipids due in 3 months   - pravastatin (PRAVACHOL) 40 MG tablet; Take 1 Tab by mouth every bedtime.  Dispense: 90 Tab; Refill: 3  - Lipid Profile; Future    2. Gastroesophageal reflux disease without esophagitis  Stable. Will request records from GI to see when EGD due next. Continue omeprazole   - omeprazole (PRILOSEC) 20 MG delayed-release capsule; Take 1 Cap by mouth 2 times daily, before breakfast and dinner.  " Dispense: 180 Cap; Refill: 3    3. Primary insomnia  Stable with trazodone        Follow up:  Return in about 1 year (around 2/13/2020).    Educated in proper administration of medication(s) ordered today including safety, possible SE, risks, benefits, rationale and alternatives to therapy.   Supportive care, differential diagnoses, and indications for immediate follow-up discussed with patient.    Pathogenesis of diagnosis discussed including typical length and natural progression.    Instructed to return to clinic or nearest emergency department for any change in condition, further concerns, or worsening of symptoms.  Patient states understanding of the plan of care and discharge instructions.      Please note that this dictation was created using voice recognition software. I have made every reasonable attempt to correct obvious errors, but I expect that there are errors of grammar and possibly content that I did not discover before finalizing the note.    Followup: Return in about 1 year (around 2/13/2020). sooner should new symptoms or problems arise.

## 2019-03-27 ENCOUNTER — OFFICE VISIT (OUTPATIENT)
Dept: MEDICAL GROUP | Facility: PHYSICIAN GROUP | Age: 59
End: 2019-03-27
Payer: COMMERCIAL

## 2019-03-27 VITALS
OXYGEN SATURATION: 95 % | HEIGHT: 72 IN | SYSTOLIC BLOOD PRESSURE: 102 MMHG | WEIGHT: 194 LBS | DIASTOLIC BLOOD PRESSURE: 66 MMHG | HEART RATE: 74 BPM | BODY MASS INDEX: 26.28 KG/M2 | TEMPERATURE: 97.3 F

## 2019-03-27 DIAGNOSIS — F32.A ANXIETY AND DEPRESSION: ICD-10-CM

## 2019-03-27 DIAGNOSIS — F41.9 ANXIETY AND DEPRESSION: ICD-10-CM

## 2019-03-27 PROBLEM — M54.50 CHRONIC LOWER BACK PAIN: Status: ACTIVE | Noted: 2019-03-27

## 2019-03-27 PROBLEM — G89.29 CHRONIC LOWER BACK PAIN: Status: ACTIVE | Noted: 2019-03-27

## 2019-03-27 PROCEDURE — 99213 OFFICE O/P EST LOW 20 MIN: CPT | Performed by: NURSE PRACTITIONER

## 2019-03-27 RX ORDER — PROPRANOLOL HYDROCHLORIDE 10 MG/1
10 TABLET ORAL
Qty: 10 TAB | Refills: 0 | Status: SHIPPED | OUTPATIENT
Start: 2019-03-27 | End: 2021-03-23

## 2019-03-27 RX ORDER — SERTRALINE HYDROCHLORIDE 100 MG/1
100 TABLET, FILM COATED ORAL DAILY
Qty: 60 TAB | Refills: 0 | Status: SHIPPED | OUTPATIENT
Start: 2019-03-27 | End: 2019-04-19 | Stop reason: SDUPTHER

## 2019-03-27 RX ORDER — HYDROXYZINE HYDROCHLORIDE 25 MG/1
25 TABLET, FILM COATED ORAL
Qty: 10 TAB | Refills: 0 | Status: SHIPPED | OUTPATIENT
Start: 2019-03-27

## 2019-03-27 NOTE — ASSESSMENT & PLAN NOTE
"Symptoms initially started with nightmares over the past few years after a work related injury with broken right knee, broken ankles, and  shoulder. This occurred May 2014. He is now on 19% disability, but is in constant pain related to this accident. He requested to go to see a therapist, and workman's comp denied this. He developed PTSD from this accident.   Symptoms of depression worsened in January 2018. He has addiction that he is overcoming separate from his PTSD accident issues. This caused problems in his personal life with family members and depression worsened. This was trigger for suicide attempt in January 2018. After hospitalization he did not have a PCP therefore there was no follow-up.    He presented to me 9 months ago with worsening nightmares and depression. There was no suicidal ideation or plan. I started him on fluoxetine, which would benefit both PTSD and depression. I had enc him to speak with his [employer] about therapy as they were concerned about finances for psychology referral for CBT.     Upon re-eval 6 weeks after starting fluoxetine, he reported feeling significantly better stating he was \"borderline perfect.\"  He was not having nightmares any longer.     Now today c/o worsening anxiety. States things have gotten out of control again with his addiction and there is guilt and shame. His [employer] is working with him, and he is starting therapy Monday with Etienne BENITEZ. Wife keeps medications locked up and there are no weapons in home.   "

## 2019-03-27 NOTE — PROGRESS NOTES
"Subjective:     Chief Complaint   Patient presents with   • Anxiety     fv getting worst       HPI  Rafy Morrison is a 58 y.o. male here today for anxiety. Here with wife today.     Reactive depression  Symptoms initially started with nightmares over the past few years after a work related injury with broken right knee, broken ankles, and  shoulder. This occurred May 2014. He is now on 19% disability, but is in constant pain related to this accident. He requested to go to see a therapist, and workman's comp denied this. He developed PTSD from this accident.   Symptoms of depression worsened in January 2018. He has addiction that he is overcoming separate from his PTSD accident issues. This caused problems in his personal life with family members and depression worsened. This was trigger for suicide attempt in January 2018. After hospitalization he did not have a PCP therefore there was no follow-up.    He presented to me 9 months ago with worsening nightmares and depression. There was no suicidal ideation or plan. I started him on fluoxetine, which would benefit both PTSD and depression. I had enc him to speak with his Mandaeism about therapy as they were concerned about finances for psychology referral for CBT.     Upon re-eval 6 weeks after starting fluoxetine, he reported feeling significantly better stating he was \"borderline perfect.\"  He was not having nightmares any longer.     Now today c/o worsening anxiety. States things have gotten out of control again with his addiction and there is guilt and shame. His [employer] is working with him, and he is starting therapy Monday with Etienne BENITEZ. Wife keeps medications locked up and there are no weapons in home.        The encounter diagnosis was Anxiety and depression.    Allergies: Nkda [no known drug allergy]  Current medicines (including changes today)  Current Outpatient Prescriptions   Medication Sig Dispense Refill   • sertraline (ZOLOFT) 50 MG " Tab Take 1 Tab by mouth every day. For one week then increase to 100 mg daily 7 Tab 0   • sertraline (ZOLOFT) 100 MG Tab Take 1 Tab by mouth every day. Needs re-eval for further refills 60 Tab 0   • hydrOXYzine HCl (ATARAX) 25 MG Tab Take 1 Tab by mouth 1 time daily as needed for Anxiety (try first for acute anxiety before propranolol). 10 Tab 0   • propranolol (INDERAL) 10 MG Tab Take 1 Tab by mouth 1 time daily as needed (anxiety). 10 Tab 0   • pravastatin (PRAVACHOL) 40 MG tablet Take 1 Tab by mouth every bedtime. 90 Tab 3   • traZODone (DESYREL) 150 MG Tab TAKE 1 TABLET BY MOUTH ONCE DAILY AT BEDTIME 90 Tab 3   • omeprazole (PRILOSEC) 20 MG delayed-release capsule Take 1 Cap by mouth 2 times daily, before breakfast and dinner. 180 Cap 3   • levothyroxine (SYNTHROID) 100 MCG Tab TAKE 1 TABLET BY MOUTH EVERY DAY IN THE MORNING ON AN EMPTY STOMACH 90 Tab 3   • therapeutic multivitamin-minerals (THERAGRAN-M) Tab Take 1 Tab by mouth every day.     • MAGNESIUM PO Take 1 Cap by mouth every evening.     • glucosamine Sulfate 500 MG Cap Take 500 mg by mouth 2 times a day, with meals.     • Inulin (FIBER CHOICE PO) Take 2 Caps by mouth every day.     • Iron-Vitamin C (IRON 100/C PO) Take 1 Tab by mouth every evening.     • cyanocobalamin (VITAMIN B-12) 100 MCG Tab Take 100 mcg by mouth every day.     • VITAMIN D, CHOLECALCIFEROL, PO Take 1 Cap by mouth every day.     • loratadine (CLARITIN) 10 MG Tab Take 10 mg by mouth every day.       No current facility-administered medications for this visit.        He  has a past medical history of Anxiety; Arthritis; Depression; Heart burn; Indigestion; Pain; Pain (5/10/12); Suicide attempt (HCC); and Unspecified disorder of thyroid.        ROS  As stated in HPI      Objective:     Blood pressure 102/66, pulse 74, temperature 36.3 °C (97.3 °F), temperature source Temporal, height 1.829 m (6'), weight 88 kg (194 lb), SpO2 95 %. Body mass index is 26.31 kg/m².  Physical  Exam:  General: Alert, oriented, in no acute distress.  Eye contact is good, speech goal directed, affect calm  CNs grossly intact.  HEENT: conjunctiva non-injected, sclera non-icteric, EOMs intact.  Gross hearing intact.  Gait steady.     Assessment and Plan:   Assessment/Plan:  1. Anxiety and depression  Not controlled. Stop fluoxetine. Start zoloft 50 mg daily for one week. Increase to 100 mg on week 2. Will discuss how he is doing with him in 4 weeks. Acute anxiety can be managed with hydroxyzine 25 mg once daily, or propranolol 10 mg daily. 10 tabs provided of each. Continue with plan for therapy. Monitor for suicidal thoughts.     The total time spent seeing the patient in consultation, and formulating an action plan for this visit was 20 minutes.  Greater than 50% of this time was spent face to face counseling, discussing problems documented above, coordinating care and answering questions by the provider.          Follow up:  Return in about 6 weeks (around 5/8/2019).    Educated in proper administration of medication(s) ordered today including safety, possible SE, risks, benefits, rationale and alternatives to therapy.   Supportive care, differential diagnoses, and indications for immediate follow-up discussed with patient.    Pathogenesis of diagnosis discussed including typical length and natural progression.    Instructed to return to clinic or nearest emergency department for any change in condition, further concerns, or worsening of symptoms.  Patient states understanding of the plan of care and discharge instructions.      Please note that this dictation was created using voice recognition software. I have made every reasonable attempt to correct obvious errors, but I expect that there are errors of grammar and possibly content that I did not discover before finalizing the note.    Followup: Return in about 6 weeks (around 5/8/2019). sooner should new symptoms or problems arise.

## 2019-04-06 DIAGNOSIS — R60.0 BILATERAL LOWER EXTREMITY EDEMA: ICD-10-CM

## 2019-04-08 RX ORDER — HYDROCHLOROTHIAZIDE 25 MG/1
25 TABLET ORAL
Qty: 90 TAB | Refills: 1 | Status: SHIPPED | OUTPATIENT
Start: 2019-04-08 | End: 2019-09-15

## 2019-04-19 ENCOUNTER — TELEPHONE (OUTPATIENT)
Dept: MEDICAL GROUP | Facility: PHYSICIAN GROUP | Age: 59
End: 2019-04-19

## 2019-04-19 RX ORDER — SERTRALINE HYDROCHLORIDE 100 MG/1
100 TABLET, FILM COATED ORAL DAILY
Qty: 90 TAB | Refills: 1 | Status: SHIPPED | OUTPATIENT
Start: 2019-04-19 | End: 2019-09-15

## 2019-04-19 RX ORDER — SERTRALINE HYDROCHLORIDE 100 MG/1
TABLET, FILM COATED ORAL
Qty: 60 TAB | Refills: 0 | OUTPATIENT
Start: 2019-04-19

## 2019-04-19 NOTE — TELEPHONE ENCOUNTER
Phone Number Called: [phone] (home)       Message: left message for pt to return my call.     Left Message for patient to call back: N\A

## 2019-04-19 NOTE — TELEPHONE ENCOUNTER
1. Caller Name: Rafy Morrison'                                         Call Back Number: [phone] (home)         Patient approves a detailed voicemail message: N\A    pt states he has had improvments with sertaline.

## 2019-04-19 NOTE — TELEPHONE ENCOUNTER
Please call patient to see if he has found any improvement yet with sertraline 100 mg daily     JACINTA Valerio.

## 2019-05-03 RX ORDER — FLUOXETINE 20 MG/1
TABLET, FILM COATED ORAL
Qty: 90 TAB | Refills: 3 | OUTPATIENT
Start: 2019-05-03

## 2019-07-30 ENCOUNTER — HOSPITAL ENCOUNTER (OUTPATIENT)
Dept: LAB | Facility: MEDICAL CENTER | Age: 59
End: 2019-07-30
Attending: FAMILY MEDICINE
Payer: COMMERCIAL

## 2019-07-30 LAB
25(OH)D3 SERPL-MCNC: 38 NG/ML (ref 30–100)
ALBUMIN SERPL BCP-MCNC: 4.9 G/DL (ref 3.2–4.9)
ALBUMIN/GLOB SERPL: 2.1 G/DL
ALP SERPL-CCNC: 59 U/L (ref 30–99)
ALT SERPL-CCNC: 22 U/L (ref 2–50)
ANION GAP SERPL CALC-SCNC: 10 MMOL/L (ref 0–11.9)
AST SERPL-CCNC: 23 U/L (ref 12–45)
BASOPHILS # BLD AUTO: 0.4 % (ref 0–1.8)
BASOPHILS # BLD: 0.02 K/UL (ref 0–0.12)
BILIRUB SERPL-MCNC: 0.4 MG/DL (ref 0.1–1.5)
BUN SERPL-MCNC: 12 MG/DL (ref 8–22)
CALCIUM SERPL-MCNC: 9 MG/DL (ref 8.5–10.5)
CHLORIDE SERPL-SCNC: 108 MMOL/L (ref 96–112)
CHOLEST SERPL-MCNC: 141 MG/DL (ref 100–199)
CO2 SERPL-SCNC: 24 MMOL/L (ref 20–33)
CREAT SERPL-MCNC: 0.93 MG/DL (ref 0.5–1.4)
EOSINOPHIL # BLD AUTO: 0.03 K/UL (ref 0–0.51)
EOSINOPHIL NFR BLD: 0.5 % (ref 0–6.9)
ERYTHROCYTE [DISTWIDTH] IN BLOOD BY AUTOMATED COUNT: 46.3 FL (ref 35.9–50)
FASTING STATUS PATIENT QL REPORTED: NORMAL
FOLATE SERPL-MCNC: >23.8 NG/ML
GLOBULIN SER CALC-MCNC: 2.3 G/DL (ref 1.9–3.5)
GLUCOSE SERPL-MCNC: 97 MG/DL (ref 65–99)
HCT VFR BLD AUTO: 45.7 % (ref 42–52)
HDLC SERPL-MCNC: 27 MG/DL
HGB BLD-MCNC: 14.6 G/DL (ref 14–18)
IMM GRANULOCYTES # BLD AUTO: 0.04 K/UL (ref 0–0.11)
IMM GRANULOCYTES NFR BLD AUTO: 0.7 % (ref 0–0.9)
LDLC SERPL CALC-MCNC: ABNORMAL MG/DL
LYMPHOCYTES # BLD AUTO: 1.09 K/UL (ref 1–4.8)
LYMPHOCYTES NFR BLD: 19.7 % (ref 22–41)
MCH RBC QN AUTO: 27.5 PG (ref 27–33)
MCHC RBC AUTO-ENTMCNC: 31.9 G/DL (ref 33.7–35.3)
MCV RBC AUTO: 86.2 FL (ref 81.4–97.8)
MONOCYTES # BLD AUTO: 0.33 K/UL (ref 0–0.85)
MONOCYTES NFR BLD AUTO: 6 % (ref 0–13.4)
NEUTROPHILS # BLD AUTO: 4.02 K/UL (ref 1.82–7.42)
NEUTROPHILS NFR BLD: 72.7 % (ref 44–72)
NRBC # BLD AUTO: 0 K/UL
NRBC BLD-RTO: 0 /100 WBC
PLATELET # BLD AUTO: 139 K/UL (ref 164–446)
PMV BLD AUTO: 10.6 FL (ref 9–12.9)
POTASSIUM SERPL-SCNC: 4.1 MMOL/L (ref 3.6–5.5)
PROT SERPL-MCNC: 7.2 G/DL (ref 6–8.2)
RBC # BLD AUTO: 5.3 M/UL (ref 4.7–6.1)
SODIUM SERPL-SCNC: 142 MMOL/L (ref 135–145)
T3FREE SERPL-MCNC: 3.75 PG/ML (ref 2.4–4.2)
T4 FREE SERPL-MCNC: 0.9 NG/DL (ref 0.53–1.43)
TESTOST SERPL-MCNC: 249 NG/DL (ref 175–781)
TRIGL SERPL-MCNC: 419 MG/DL (ref 0–149)
TSH SERPL DL<=0.005 MIU/L-ACNC: 0.59 UIU/ML (ref 0.38–5.33)
VIT B12 SERPL-MCNC: 529 PG/ML (ref 211–911)
WBC # BLD AUTO: 5.5 K/UL (ref 4.8–10.8)

## 2019-07-30 PROCEDURE — 84403 ASSAY OF TOTAL TESTOSTERONE: CPT

## 2019-07-30 PROCEDURE — 82746 ASSAY OF FOLIC ACID SERUM: CPT

## 2019-07-30 PROCEDURE — 86800 THYROGLOBULIN ANTIBODY: CPT

## 2019-07-30 PROCEDURE — 82306 VITAMIN D 25 HYDROXY: CPT

## 2019-07-30 PROCEDURE — 84443 ASSAY THYROID STIM HORMONE: CPT

## 2019-07-30 PROCEDURE — 80061 LIPID PANEL: CPT

## 2019-07-30 PROCEDURE — 84481 FREE ASSAY (FT-3): CPT

## 2019-07-30 PROCEDURE — 84439 ASSAY OF FREE THYROXINE: CPT

## 2019-07-30 PROCEDURE — 80053 COMPREHEN METABOLIC PANEL: CPT

## 2019-07-30 PROCEDURE — 82607 VITAMIN B-12: CPT

## 2019-07-30 PROCEDURE — 85025 COMPLETE CBC W/AUTO DIFF WBC: CPT

## 2019-07-30 PROCEDURE — 36415 COLL VENOUS BLD VENIPUNCTURE: CPT

## 2019-07-31 LAB
TESTOST FREE SERPL-MCNC: 55 PG/ML (ref 47–244)
THYROGLOB AB SERPL-ACNC: <0.9 IU/ML (ref 0–4)

## 2019-08-15 ENCOUNTER — OFFICE VISIT (OUTPATIENT)
Dept: URGENT CARE | Facility: PHYSICIAN GROUP | Age: 59
End: 2019-08-15
Payer: COMMERCIAL

## 2019-08-15 VITALS
SYSTOLIC BLOOD PRESSURE: 112 MMHG | BODY MASS INDEX: 27.06 KG/M2 | HEIGHT: 71 IN | OXYGEN SATURATION: 97 % | DIASTOLIC BLOOD PRESSURE: 68 MMHG | RESPIRATION RATE: 16 BRPM | HEART RATE: 69 BPM | TEMPERATURE: 98 F

## 2019-08-15 DIAGNOSIS — H00.011 HORDEOLUM EXTERNUM OF RIGHT UPPER EYELID: ICD-10-CM

## 2019-08-15 PROCEDURE — 99214 OFFICE O/P EST MOD 30 MIN: CPT | Performed by: FAMILY MEDICINE

## 2019-08-15 RX ORDER — POLYMYXIN B SULFATE AND TRIMETHOPRIM 1; 10000 MG/ML; [USP'U]/ML
1 SOLUTION OPHTHALMIC 4 TIMES DAILY
Qty: 1 BOTTLE | Refills: 0 | Status: SHIPPED | OUTPATIENT
Start: 2019-08-15 | End: 2019-09-15

## 2019-08-15 RX ORDER — SULFAMETHOXAZOLE AND TRIMETHOPRIM 800; 160 MG/1; MG/1
1 TABLET ORAL 2 TIMES DAILY
Qty: 14 TAB | Refills: 0 | Status: SHIPPED | OUTPATIENT
Start: 2019-08-15 | End: 2019-08-22

## 2019-08-15 RX ORDER — ESCITALOPRAM OXALATE 10 MG/1
10 TABLET ORAL
Refills: 1 | COMMUNITY
Start: 2019-07-09 | End: 2021-03-23

## 2019-08-18 ENCOUNTER — OFFICE VISIT (OUTPATIENT)
Dept: URGENT CARE | Facility: PHYSICIAN GROUP | Age: 59
End: 2019-08-18
Payer: COMMERCIAL

## 2019-08-18 VITALS
DIASTOLIC BLOOD PRESSURE: 62 MMHG | HEART RATE: 85 BPM | HEIGHT: 72 IN | TEMPERATURE: 97.9 F | WEIGHT: 202 LBS | BODY MASS INDEX: 27.36 KG/M2 | OXYGEN SATURATION: 94 % | SYSTOLIC BLOOD PRESSURE: 106 MMHG

## 2019-08-18 DIAGNOSIS — H00.023 MEIBOMIAN BLEPHARITIS, RIGHT: Primary | ICD-10-CM

## 2019-08-18 PROCEDURE — 99214 OFFICE O/P EST MOD 30 MIN: CPT | Performed by: PHYSICIAN ASSISTANT

## 2019-08-18 RX ORDER — CEPHALEXIN 500 MG/1
500 CAPSULE ORAL 4 TIMES DAILY
Qty: 40 CAP | Refills: 0 | Status: SHIPPED | OUTPATIENT
Start: 2019-08-18 | End: 2019-08-28

## 2019-08-18 RX ORDER — PREDNISONE 20 MG/1
TABLET ORAL
Qty: 23 TAB | Refills: 0 | Status: SHIPPED | OUTPATIENT
Start: 2019-08-18 | End: 2020-01-11

## 2019-08-18 NOTE — LETTER
August 18, 2019       Patient: Rafy Morrison   YOB: 1960   Date of Visit: 8/18/2019         To Whom It May Concern:    It is my medical opinion that Rafy Morrison may be excused from work for the dates of 8/19/19-8/20/19.      If you have any questions or concerns, please don't hesitate to call [phone]          Sincerely,          Roe Robles P.A.-C.  Electronically Signed

## 2019-08-19 NOTE — PROGRESS NOTES
Subjective:      Pt is a 59 y.o. male who presents with Eye Problem (Rt eye is red, painful and swollen, Tx on 8/15 is not helping )            HPI  This is a new problem. Pt notes right upper eyelid swelling with redness x 5 days. He was seen in the [facility] 3 days ago and given Polytrim and bactrim and notes no improvement. Pt states the pain is a 4/10, aching in nature and worse at night. Pt denies CP, SOB, NVD, paresthesias, headaches, dizziness, change in vision, hives, or other joint pain. The pt's medication list, problem list, and allergies have been evaluated and reviewed during today's visit.    PMH:  Past Medical History:   Diagnosis Date   • Anxiety    • Arthritis    • Depression    • Heart burn    • Indigestion    • Pain     right hip   • Pain 5/10/12    left hip   • Suicide attempt (HCC)    • Unspecified disorder of thyroid     hypothyroid       PSH:  Past Surgical History:   Procedure Laterality Date   • ANKLE ORIF  5/27/2014    Performed by Ian Escalera M.D. at SURGERY [facility]   • TIBIA PLATEAU ORIF  5/24/2014    Performed by Ian Escalera M.D. at SURGERY [facility]   • HIP ARTHROSCOPY  5/18/2012    Performed by MERARY SPENCER at [facility]   • ACETABULAR OSTEOTOMY  5/18/2012    Performed by MERARY SPENCER at [facility]   • SYNOVECTOMY  5/18/2012    Performed by MERARY SPENCER at [facility]   • HIP ARTHROPLASTY TOTAL  8/15/2011    Performed by MERARY SPENCER at [facility]   • RECOVERY  8/8/2011    Performed by SURGERY, IR-RECOVERY at SURGERY SAME DAY [facility]   • HIP ARTHROSCOPY  3/9/2011    right; Performed by MERARY SPENCER at [facility]   • KNEE ARTHROSCOPY  6/2010    right   • DEBRIDEMENT  11/23/2009    right   • HIP ARTHROSCOPY  11/23/2009    right   • HAND SURGERY  2007    cyst excision and debridement left hand   • HERNIA REP ING BILAT  2005    and umbilical hernia repair   • ROTATOR CUFF  REPAIR      left   • NERVE ULNAR TRANSFER  2001    right   • OTHER ORTHOPEDIC SURGERY  1979    left hand and small ring finger       Fam Hx:    family history includes Cancer in his maternal grandmother, paternal uncle, and another family member; Diabetes in his sister and another family member; GI Disease in his sister; Hypertension in an other family member; Kidney Disease in his father; No Known Problems in his maternal grandfather, mother, paternal grandfather, and paternal grandmother.  Family Status   Relation Name Status   • Mo   at age 82        old age   • Fa   at age 73        kidney failure   • Sis x 3 Alive   • MGMo     • MGFa     • PGMo     • PGFa     • Bro x 4 Alive   • Young  Alive   • Young  Alive   • Son  Alive   • OTHER  (Not Specified)   • Sis  (Not Specified)   • PUnc  (Not Specified)       Soc HX:  Social History     Socioeconomic History   • Marital status:      Spouse name: Not on file   • Number of children: 3   • Years of education: Not on file   • Highest education level: Not on file   Occupational History   • Not on file   Social Needs   • Financial resource strain: Not on file   • Food insecurity:     Worry: Not on file     Inability: Not on file   • Transportation needs:     Medical: Not on file     Non-medical: Not on file   Tobacco Use   • Smoking status: Never Smoker   • Smokeless tobacco: Never Used   Substance and Sexual Activity   • Alcohol use: No     Alcohol/week: 0.0 oz   • Drug use: No   • Sexual activity: Yes     Partners: Female     Birth control/protection: Surgical     Comment: wife has hysterectomy   Lifestyle   • Physical activity:     Days per week: Not on file     Minutes per session: Not on file   • Stress: Not on file   Relationships   • Social connections:     Talks on phone: Not on file     Gets together: Not on file     Attends [employer] service: Not on file     Active member of club or  organization: Not on file     Attends meetings of clubs or organizations: Not on file     Relationship status: Not on file   • Intimate partner violence:     Fear of current or ex partner: Not on file     Emotionally abused: Not on file     Physically abused: Not on file     Forced sexual activity: Not on file   Other Topics Concern   • Not on file   Social History Narrative   • Not on file         Medications:    Current Outpatient Medications:   •  cephALEXin (KEFLEX) 500 MG Cap, Take 1 Cap by mouth 4 times a day for 10 days., Disp: 40 Cap, Rfl: 0  •  predniSONE (DELTASONE) 20 MG Tab, Take 3 tabs at once PO daily x 5 days, then take 2 tabs at once daily x 3 days, then take 1 tab PO daily x 2 days, Disp: 23 Tab, Rfl: 0  •  escitalopram (LEXAPRO) 10 MG Tab, Take 10 mg by mouth every day., Disp: , Rfl: 1  •  polymixin-trimethoprim (POLYTRIM) 84111-3.1 UNIT/ML-% Solution, Place 1 Drop in right eye 4 times a day., Disp: 1 Bottle, Rfl: 0  •  sulfamethoxazole-trimethoprim (BACTRIM DS) 800-160 MG tablet, Take 1 Tab by mouth 2 times a day for 7 days., Disp: 14 Tab, Rfl: 0  •  sertraline (ZOLOFT) 100 MG Tab, Take 1 Tab by mouth every day. (Patient not taking: Reported on 8/15/2019), Disp: 90 Tab, Rfl: 1  •  hydroCHLOROthiazide (HYDRODIURIL) 25 MG Tab, TAKE 1 TAB BY MOUTH 1 TIME DAILY AS NEEDED (LEG EDEMA). (Patient not taking: Reported on 8/15/2019), Disp: 90 Tab, Rfl: 1  •  hydrOXYzine HCl (ATARAX) 25 MG Tab, Take 1 Tab by mouth 1 time daily as needed for Anxiety (try first for acute anxiety before propranolol)., Disp: 10 Tab, Rfl: 0  •  propranolol (INDERAL) 10 MG Tab, Take 1 Tab by mouth 1 time daily as needed (anxiety)., Disp: 10 Tab, Rfl: 0  •  pravastatin (PRAVACHOL) 40 MG tablet, Take 1 Tab by mouth every bedtime., Disp: 90 Tab, Rfl: 3  •  traZODone (DESYREL) 150 MG Tab, TAKE 1 TABLET BY MOUTH ONCE DAILY AT BEDTIME, Disp: 90 Tab, Rfl: 3  •  omeprazole (PRILOSEC) 20 MG delayed-release capsule, Take 1 Cap by mouth 2  times daily, before breakfast and dinner., Disp: 180 Cap, Rfl: 3  •  levothyroxine (SYNTHROID) 100 MCG Tab, TAKE 1 TABLET BY MOUTH EVERY DAY IN THE MORNING ON AN EMPTY STOMACH, Disp: 90 Tab, Rfl: 3  •  loratadine (CLARITIN) 10 MG Tab, Take 10 mg by mouth every day., Disp: , Rfl:   •  therapeutic multivitamin-minerals (THERAGRAN-M) Tab, Take 1 Tab by mouth every day., Disp: , Rfl:   •  MAGNESIUM PO, Take 1 Cap by mouth every evening., Disp: , Rfl:   •  glucosamine Sulfate 500 MG Cap, Take 500 mg by mouth 2 times a day, with meals., Disp: , Rfl:   •  Inulin (FIBER CHOICE PO), Take 2 Caps by mouth every day., Disp: , Rfl:   •  Iron-Vitamin C (IRON 100/C PO), Take 1 Tab by mouth every evening., Disp: , Rfl:   •  cyanocobalamin (VITAMIN B-12) 100 MCG Tab, Take 100 mcg by mouth every day., Disp: , Rfl:   •  VITAMIN D, CHOLECALCIFEROL, PO, Take 1 Cap by mouth every day., Disp: , Rfl:       Allergies:  Nkda [no known drug allergy]    ROS  Constitutional: Negative for fever, chills and malaise/fatigue.   HENT: Negative for congestion and sore throat.    Eyes: Negative for blurred vision, double vision and photophobia. +right upper eyelid redness and swelling  Respiratory: Negative for cough and shortness of breath.  Cardiovascular: Negative for chest pain and palpitations.   Gastrointestinal: Negative for heartburn, nausea, vomiting, abdominal pain, diarrhea and constipation.   Genitourinary: Negative for dysuria and flank pain.   Musculoskeletal: Negative for joint pain and myalgias.   Skin: Negative for itching and rash.   Neurological: Negative for dizziness, tingling and headaches.   Endo/Heme/Allergies: Does not bruise/bleed easily.   Psychiatric/Behavioral: Negative for depression. The patient is not nervous/anxious.           Objective:     /62 (BP Location: Right arm, Patient Position: Sitting, BP Cuff Size: Large adult)   Pulse 85   Temp 36.6 °C (97.9 °F) (Temporal)   Ht 1.829 m (6')   Wt 91.6 kg (202 lb)    SpO2 94%   BMI 27.40 kg/m²      Physical Exam   Eyes: Pupils are equal, round, and reactive to light. Conjunctivae and EOM are normal. Lids are everted and swept, no foreign bodies found. Right eye exhibits hordeolum. Right eye exhibits no chemosis, no discharge and no exudate. No foreign body present in the right eye. Left eye exhibits no chemosis, no discharge, no exudate and no hordeolum. No foreign body present in the left eye. No scleral icterus.              Constitutional: PT is oriented to person, place, and time. PT appears well-developed and well-nourished. No distress.   HENT:   Head: Normocephalic and atraumatic.   Mouth/Throat: Oropharynx is clear and moist. No oropharyngeal exudate.   Eyes: Conjunctivae normal and EOM are normal. Pupils are equal, round, and reactive to light. +right upper eyelid edema, erythema and TTP  Neck: Normal range of motion. Neck supple. No thyromegaly present.   Cardiovascular: Normal rate, regular rhythm, normal heart sounds and intact distal pulses.  Exam reveals no gallop and no friction rub.    No murmur heard.  Pulmonary/Chest: Effort normal and breath sounds normal. No respiratory distress. PT has no wheezes. PT has no rales. Pt exhibits no tenderness.   Abdominal: Soft. Bowel sounds are normal. PT exhibits no distension and no mass. There is no tenderness. There is no rebound and no guarding.   Musculoskeletal: Normal range of motion. PT exhibits no edema and no tenderness.   Neurological: PT is alert and oriented to person, place, and time. PT has normal reflexes. No cranial nerve deficit.   Skin: Skin is warm and dry. No rash noted. PT is not diaphoretic. No erythema.       Psychiatric: PT has a normal mood and affect. PT behavior is normal. Judgment and thought content normal.        Assessment/Plan:     1. Meibomian blepharitis, right    - cephALEXin (KEFLEX) 500 MG Cap; Take 1 Cap by mouth 4 times a day for 10 days.  Dispense: 40 Cap; Refill: 0  - predniSONE  (DELTASONE) 20 MG Tab; Take 3 tabs at once PO daily x 5 days, then take 2 tabs at once daily x 3 days, then take 1 tab PO daily x 2 days  Dispense: 23 Tab; Refill: 0    Hourly warm multi-compresses recommended  Rest, fluids encouraged.  AVS with medical info given.  Pt was in full understanding and agreement with the plan.  Differential diagnosis, natural history, supportive care, and indications for immediate follow-up discussed. All questions answered. Patient agrees with the plan of care.  Follow-up as needed if symptoms worsen or fail to improve.

## 2019-09-15 ENCOUNTER — OFFICE VISIT (OUTPATIENT)
Dept: URGENT CARE | Facility: PHYSICIAN GROUP | Age: 59
End: 2019-09-15
Payer: COMMERCIAL

## 2019-09-15 VITALS
WEIGHT: 202 LBS | BODY MASS INDEX: 27.36 KG/M2 | HEART RATE: 74 BPM | TEMPERATURE: 98.7 F | DIASTOLIC BLOOD PRESSURE: 74 MMHG | HEIGHT: 72 IN | SYSTOLIC BLOOD PRESSURE: 114 MMHG | OXYGEN SATURATION: 98 % | RESPIRATION RATE: 18 BRPM

## 2019-09-15 DIAGNOSIS — H00.023 MEIBOMIAN BLEPHARITIS, RIGHT: Primary | ICD-10-CM

## 2019-09-15 PROCEDURE — 99214 OFFICE O/P EST MOD 30 MIN: CPT | Performed by: PHYSICIAN ASSISTANT

## 2019-09-15 RX ORDER — MOXIFLOXACIN 5 MG/ML
1 SOLUTION/ DROPS OPHTHALMIC 3 TIMES DAILY
Qty: 1 BOTTLE | Refills: 0 | Status: SHIPPED | OUTPATIENT
Start: 2019-09-15 | End: 2019-09-20

## 2019-09-15 ASSESSMENT — ENCOUNTER SYMPTOMS
CONSTIPATION: 0
EYE PAIN: 1
EYE DISCHARGE: 1
COUGH: 0
PHOTOPHOBIA: 0
DIARRHEA: 0
VOMITING: 0
NAUSEA: 0
FEVER: 0
CHILLS: 0
SHORTNESS OF BREATH: 0
BLURRED VISION: 0
DOUBLE VISION: 0
ABDOMINAL PAIN: 0
EYE REDNESS: 0

## 2019-09-15 NOTE — PROGRESS NOTES
Subjective:   Rafy Morrison is a 59 y.o. male who presents for Stye (right eye, patient was seen about a month ago for this. the stye never went away. )        Eye Problem    The right eye is affected. This is a new problem. Episode onset: 1 month. The problem occurs constantly. The problem has been waxing and waning. Associated symptoms include an eye discharge. Pertinent negatives include no blurred vision, double vision, eye redness, fever, nausea, photophobia or vomiting.     Pt was seen on 8/15/19 tx for Hordeolum with Polytrim and Bactrim DS. He was seen again on 8/18/19 and add Keflex and prednisone. Pt completed full course of abx and steroid.     Review of Systems   Constitutional: Negative for chills, fever and malaise/fatigue.   Eyes: Positive for pain and discharge. Negative for blurred vision, double vision, photophobia and redness.   Respiratory: Negative for cough and shortness of breath.    Gastrointestinal: Negative for abdominal pain, constipation, diarrhea, nausea and vomiting.   All other systems reviewed and are negative.      PMH:  has a past medical history of Anxiety, Arthritis, Depression, Heart burn, Indigestion, Pain, Pain (5/10/12), Suicide attempt ([facility]), and Unspecified disorder of thyroid.  MEDS:   Current Outpatient Medications:   •  moxifloxacin (VIGAMOX) 0.5 % Solution, Place 1 Drop in right eye 3 times a day for 5 days., Disp: 1 Bottle, Rfl: 0  •  predniSONE (DELTASONE) 20 MG Tab, Take 3 tabs at once PO daily x 5 days, then take 2 tabs at once daily x 3 days, then take 1 tab PO daily x 2 days, Disp: 23 Tab, Rfl: 0  •  escitalopram (LEXAPRO) 10 MG Tab, Take 10 mg by mouth every day., Disp: , Rfl: 1  •  hydrOXYzine HCl (ATARAX) 25 MG Tab, Take 1 Tab by mouth 1 time daily as needed for Anxiety (try first for acute anxiety before propranolol)., Disp: 10 Tab, Rfl: 0  •  propranolol (INDERAL) 10 MG Tab, Take 1 Tab by mouth 1 time daily as needed (anxiety)., Disp: 10 Tab, Rfl: 0  •   pravastatin (PRAVACHOL) 40 MG tablet, Take 1 Tab by mouth every bedtime., Disp: 90 Tab, Rfl: 3  •  traZODone (DESYREL) 150 MG Tab, TAKE 1 TABLET BY MOUTH ONCE DAILY AT BEDTIME, Disp: 90 Tab, Rfl: 3  •  omeprazole (PRILOSEC) 20 MG delayed-release capsule, Take 1 Cap by mouth 2 times daily, before breakfast and dinner., Disp: 180 Cap, Rfl: 3  •  levothyroxine (SYNTHROID) 100 MCG Tab, TAKE 1 TABLET BY MOUTH EVERY DAY IN THE MORNING ON AN EMPTY STOMACH, Disp: 90 Tab, Rfl: 3  •  loratadine (CLARITIN) 10 MG Tab, Take 10 mg by mouth every day., Disp: , Rfl:   •  therapeutic multivitamin-minerals (THERAGRAN-M) Tab, Take 1 Tab by mouth every day., Disp: , Rfl:   •  MAGNESIUM PO, Take 1 Cap by mouth every evening., Disp: , Rfl:   •  glucosamine Sulfate 500 MG Cap, Take 500 mg by mouth 2 times a day, with meals., Disp: , Rfl:   •  Inulin (FIBER CHOICE PO), Take 2 Caps by mouth every day., Disp: , Rfl:   •  Iron-Vitamin C (IRON 100/C PO), Take 1 Tab by mouth every evening., Disp: , Rfl:   •  cyanocobalamin (VITAMIN B-12) 100 MCG Tab, Take 100 mcg by mouth every day., Disp: , Rfl:   •  VITAMIN D, CHOLECALCIFEROL, PO, Take 1 Cap by mouth every day., Disp: , Rfl:   ALLERGIES:   Allergies   Allergen Reactions   • Nkda [No Known Drug Allergy]      SURGHX:   Past Surgical History:   Procedure Laterality Date   • ANKLE ORIF  5/27/2014    Performed by Ian Escalera M.D. at SURGERY [facility]   • TIBIA PLATEAU ORIF  5/24/2014    Performed by Ian Escalera M.D. at SURGERY [facility]   • HIP ARTHROSCOPY  5/18/2012    Performed by MERARY SPENCER at [facility]   • ACETABULAR OSTEOTOMY  5/18/2012    Performed by MERARY SPENCER at [facility]   • SYNOVECTOMY  5/18/2012    Performed by MERARY SPENCER at [facility]   • HIP ARTHROPLASTY TOTAL  8/15/2011    Performed by MERARY SPENCER at SURGERY [facility]   • RECOVERY  8/8/2011    Performed by SURGERY, IR-RECOVERY at  SURGERY SAME DAY [facility] ORS   • HIP ARTHROSCOPY  3/9/2011    right; Performed by MERARY SPENCER at SURGERY [facility]   • KNEE ARTHROSCOPY  6/2010    right   • DEBRIDEMENT  11/23/2009    right   • HIP ARTHROSCOPY  11/23/2009    right   • HAND SURGERY  2007    cyst excision and debridement left hand   • HERNIA REP ING BILAT  2005    and umbilical hernia repair   • ROTATOR CUFF REPAIR  2004    left   • NERVE ULNAR TRANSFER  2001    right   • OTHER ORTHOPEDIC SURGERY  1979    left hand and small ring finger     SOCHX:  reports that he has never smoked. He has never used smokeless tobacco. He reports that he does not drink alcohol or use drugs.  Family History   Problem Relation Age of Onset   • No Known Problems Mother    • Kidney Disease Father         kidney failure on dialysis   • Cancer Maternal Grandmother    • No Known Problems Maternal Grandfather    • No Known Problems Paternal Grandmother    • No Known Problems Paternal Grandfather    • Diabetes Other    • Hypertension Other    • Cancer Other    • Diabetes Sister         type 1   • GI Disease Sister         celiac disease   • Cancer Paternal Uncle         ?        Objective:   /74 (BP Location: Right arm, Patient Position: Sitting, BP Cuff Size: Adult)   Pulse 74   Temp 37.1 °C (98.7 °F) (Temporal)   Resp 18   Ht 1.829 m (6')   Wt 91.6 kg (202 lb)   SpO2 98%   BMI 27.40 kg/m²     Physical Exam   Constitutional: He is oriented to person, place, and time. He appears well-developed and well-nourished. No distress.   HENT:   Head: Normocephalic and atraumatic.   Nose: Nose normal.   Eyes: Pupils are equal, round, and reactive to light. Conjunctivae and EOM are normal.       Neck: Normal range of motion. Neck supple. No tracheal deviation present.   Cardiovascular: Normal rate and regular rhythm.   Pulmonary/Chest: Effort normal and breath sounds normal.   Neurological: He is alert and oriented to person, place, and time.   Skin: Skin is  warm and dry. Capillary refill takes less than 2 seconds.   Psychiatric: He has a normal mood and affect. His behavior is normal.   Vitals reviewed.        Assessment/Plan:     1. Meibomian blepharitis, right  moxifloxacin (VIGAMOX) 0.5 % Solution    REFERRAL TO OPHTHALMOLOGY     Supportive care reviewed. Start baby shampoo washes. Blepharitis handout provided. A referral was placed to f/u with ophthalmology. Pt given information to [facility] emergency line.     If symptoms worsen or persist patient can return to clinic for reevaluation.  Red flags and emergency room precautions discussed. All side effects of medication discussed including allergic response, GI upset, tendon injury, etc. Patient confirmed understanding of information.    Please note that this dictation was created using voice recognition software. I have made every reasonable attempt to correct obvious errors, but I expect that there are errors of grammar and possibly content that I did not discover before finalizing the note.

## 2019-11-01 ENCOUNTER — HOSPITAL ENCOUNTER (OUTPATIENT)
Dept: LAB | Facility: MEDICAL CENTER | Age: 59
End: 2019-11-01
Attending: FAMILY MEDICINE
Payer: COMMERCIAL

## 2019-11-01 LAB
25(OH)D3 SERPL-MCNC: 39 NG/ML (ref 30–100)
ALBUMIN SERPL BCP-MCNC: 4.3 G/DL (ref 3.2–4.9)
ALBUMIN/GLOB SERPL: 2.5 G/DL
ALP SERPL-CCNC: 40 U/L (ref 30–99)
ALT SERPL-CCNC: 22 U/L (ref 2–50)
ANION GAP SERPL CALC-SCNC: 10 MMOL/L (ref 0–11.9)
AST SERPL-CCNC: 22 U/L (ref 12–45)
BASOPHILS # BLD AUTO: 0.7 % (ref 0–1.8)
BASOPHILS # BLD: 0.03 K/UL (ref 0–0.12)
BILIRUB SERPL-MCNC: 0.6 MG/DL (ref 0.1–1.5)
BUN SERPL-MCNC: 10 MG/DL (ref 8–22)
CALCIUM SERPL-MCNC: 8.3 MG/DL (ref 8.5–10.5)
CHLORIDE SERPL-SCNC: 108 MMOL/L (ref 96–112)
CHOLEST SERPL-MCNC: 160 MG/DL (ref 100–199)
CO2 SERPL-SCNC: 25 MMOL/L (ref 20–33)
CREAT SERPL-MCNC: 1 MG/DL (ref 0.5–1.4)
EOSINOPHIL # BLD AUTO: 0.03 K/UL (ref 0–0.51)
EOSINOPHIL NFR BLD: 0.7 % (ref 0–6.9)
ERYTHROCYTE [DISTWIDTH] IN BLOOD BY AUTOMATED COUNT: 46.9 FL (ref 35.9–50)
FASTING STATUS PATIENT QL REPORTED: NORMAL
GLOBULIN SER CALC-MCNC: 1.7 G/DL (ref 1.9–3.5)
GLUCOSE SERPL-MCNC: 83 MG/DL (ref 65–99)
HCT VFR BLD AUTO: 45.6 % (ref 42–52)
HDLC SERPL-MCNC: 31 MG/DL
HGB BLD-MCNC: 14.8 G/DL (ref 14–18)
IMM GRANULOCYTES # BLD AUTO: 0.04 K/UL (ref 0–0.11)
IMM GRANULOCYTES NFR BLD AUTO: 0.9 % (ref 0–0.9)
LDLC SERPL CALC-MCNC: ABNORMAL MG/DL
LYMPHOCYTES # BLD AUTO: 0.99 K/UL (ref 1–4.8)
LYMPHOCYTES NFR BLD: 22.1 % (ref 22–41)
MCH RBC QN AUTO: 28.2 PG (ref 27–33)
MCHC RBC AUTO-ENTMCNC: 32.5 G/DL (ref 33.7–35.3)
MCV RBC AUTO: 86.9 FL (ref 81.4–97.8)
MONOCYTES # BLD AUTO: 0.25 K/UL (ref 0–0.85)
MONOCYTES NFR BLD AUTO: 5.6 % (ref 0–13.4)
NEUTROPHILS # BLD AUTO: 3.14 K/UL (ref 1.82–7.42)
NEUTROPHILS NFR BLD: 70 % (ref 44–72)
NRBC # BLD AUTO: 0 K/UL
NRBC BLD-RTO: 0 /100 WBC
PLATELET # BLD AUTO: 139 K/UL (ref 164–446)
PMV BLD AUTO: 10.4 FL (ref 9–12.9)
POTASSIUM SERPL-SCNC: 4 MMOL/L (ref 3.6–5.5)
PROT SERPL-MCNC: 6 G/DL (ref 6–8.2)
RBC # BLD AUTO: 5.25 M/UL (ref 4.7–6.1)
SODIUM SERPL-SCNC: 143 MMOL/L (ref 135–145)
T3FREE SERPL-MCNC: 3.69 PG/ML (ref 2.4–4.2)
T4 FREE SERPL-MCNC: 0.87 NG/DL (ref 0.53–1.43)
TRIGL SERPL-MCNC: 447 MG/DL (ref 0–149)
TSH SERPL DL<=0.005 MIU/L-ACNC: 0.97 UIU/ML (ref 0.38–5.33)
WBC # BLD AUTO: 4.5 K/UL (ref 4.8–10.8)

## 2019-11-01 PROCEDURE — 84443 ASSAY THYROID STIM HORMONE: CPT

## 2019-11-01 PROCEDURE — 84439 ASSAY OF FREE THYROXINE: CPT

## 2019-11-01 PROCEDURE — 80061 LIPID PANEL: CPT

## 2019-11-01 PROCEDURE — 80053 COMPREHEN METABOLIC PANEL: CPT

## 2019-11-01 PROCEDURE — 86800 THYROGLOBULIN ANTIBODY: CPT

## 2019-11-01 PROCEDURE — 85025 COMPLETE CBC W/AUTO DIFF WBC: CPT

## 2019-11-01 PROCEDURE — 84481 FREE ASSAY (FT-3): CPT

## 2019-11-01 PROCEDURE — 82306 VITAMIN D 25 HYDROXY: CPT

## 2019-11-01 PROCEDURE — 84270 ASSAY OF SEX HORMONE GLOBUL: CPT

## 2019-11-01 PROCEDURE — 36415 COLL VENOUS BLD VENIPUNCTURE: CPT

## 2019-11-01 PROCEDURE — 84403 ASSAY OF TOTAL TESTOSTERONE: CPT

## 2019-11-02 LAB
SHBG SERPL-SCNC: 35 NMOL/L (ref 11–80)
TESTOST FREE MFR SERPL: 1.8 % (ref 1.6–2.9)
TESTOST FREE SERPL-MCNC: 58 PG/ML (ref 47–244)
TESTOST SERPL-MCNC: 331 NG/DL (ref 300–890)
THYROGLOB AB SERPL-ACNC: <0.9 IU/ML (ref 0–4)

## 2020-01-11 ENCOUNTER — OFFICE VISIT (OUTPATIENT)
Dept: URGENT CARE | Facility: PHYSICIAN GROUP | Age: 60
End: 2020-01-11
Payer: COMMERCIAL

## 2020-01-11 VITALS
DIASTOLIC BLOOD PRESSURE: 60 MMHG | RESPIRATION RATE: 16 BRPM | SYSTOLIC BLOOD PRESSURE: 108 MMHG | BODY MASS INDEX: 28.61 KG/M2 | TEMPERATURE: 97.7 F | OXYGEN SATURATION: 96 % | HEIGHT: 72 IN | WEIGHT: 211.2 LBS | HEART RATE: 94 BPM

## 2020-01-11 DIAGNOSIS — R05.9 COUGH: ICD-10-CM

## 2020-01-11 DIAGNOSIS — R09.82 POST-NASAL DRIP: ICD-10-CM

## 2020-01-11 PROCEDURE — 99214 OFFICE O/P EST MOD 30 MIN: CPT | Performed by: PHYSICIAN ASSISTANT

## 2020-01-11 RX ORDER — METHYLPREDNISOLONE 4 MG/1
TABLET ORAL
Qty: 1 PACKAGE | Refills: 0 | Status: SHIPPED | OUTPATIENT
Start: 2020-01-11 | End: 2021-03-23

## 2020-01-11 RX ORDER — BENZONATATE 200 MG/1
200 CAPSULE ORAL 3 TIMES DAILY PRN
Qty: 30 CAP | Refills: 0 | Status: SHIPPED | OUTPATIENT
Start: 2020-01-11 | End: 2021-03-23

## 2020-01-11 RX ORDER — ALBUTEROL SULFATE 2.5 MG/3ML
2.5 SOLUTION RESPIRATORY (INHALATION) EVERY 4 HOURS PRN
Qty: 30 BULLET | Refills: 0 | Status: SHIPPED | OUTPATIENT
Start: 2020-01-11 | End: 2021-03-23

## 2020-01-11 RX ORDER — FLUTICASONE PROPIONATE 50 MCG
1 SPRAY, SUSPENSION (ML) NASAL DAILY
Qty: 1 BOTTLE | Refills: 0 | Status: SHIPPED | OUTPATIENT
Start: 2020-01-11

## 2020-01-11 RX ORDER — IPRATROPIUM BROMIDE AND ALBUTEROL SULFATE 2.5; .5 MG/3ML; MG/3ML
3 SOLUTION RESPIRATORY (INHALATION) ONCE
Status: COMPLETED | OUTPATIENT
Start: 2020-01-11 | End: 2020-01-11

## 2020-01-11 RX ADMIN — IPRATROPIUM BROMIDE AND ALBUTEROL SULFATE 3 ML: 2.5; .5 SOLUTION RESPIRATORY (INHALATION) at 14:38

## 2020-01-11 ASSESSMENT — ENCOUNTER SYMPTOMS
COUGH: 1
DIARRHEA: 0
WHEEZING: 0
HEADACHES: 0
ABDOMINAL PAIN: 0
VOMITING: 0
DIZZINESS: 0
FEVER: 0
SORE THROAT: 1
CHILLS: 0
PALPITATIONS: 0
SPUTUM PRODUCTION: 0
SHORTNESS OF BREATH: 1
MYALGIAS: 0
NAUSEA: 0

## 2020-01-11 NOTE — PROGRESS NOTES
Subjective:      Rafy Morrison is a 59 y.o. male who presents with Cough (non stop cough, chest pain from coughing, has been doing breathing treatments at home but he feels like its has not been helping, was sick over betty was seen in [place] and was prescribed medications and not feeling any better, sore throat, post nasal drip, x4 weeks )    HPI:  This is a new problem. Rafy Morrison is a 59 y.o. male who presents today for evaluation of cough.  Patient states that he was sick approximately 1 week before Christmas when he was in [place].  He said at that time he had sore throat, cough, nasal congestion.  He was seen in urgent care there and was prescribed symptoms medications.  He said most of his other symptoms started but better immediately the next day but the cough has remained.  He said that when he has a coughing fit he feels short of breath.  He said the cough is nonproductive and he feels like it is stemming more from postnasal drip but is entirely uncontrollable.  He has no history of asthma.  He takes a daily Claritin and has been using saline solution and a nebulizer machine that he bought from the store.  He says nothing is working, however.  No fever/chills, chest pain, lightheadedness/dizziness.  No history of asthma or COPD.  Patient is a non-smoker.      Review of Systems   Constitutional: Positive for malaise/fatigue. Negative for chills and fever.   HENT: Positive for congestion and sore throat (Mild, intermittent).    Respiratory: Positive for cough and shortness of breath. Negative for sputum production and wheezing.    Cardiovascular: Negative for chest pain and palpitations.   Gastrointestinal: Negative for abdominal pain, diarrhea, nausea and vomiting.   Musculoskeletal: Negative for myalgias.   Skin: Negative for rash.   Neurological: Negative for dizziness and headaches.   Endo/Heme/Allergies: Positive for environmental allergies.       PMH:  has a past medical  history of Anxiety, Arthritis, Depression, Heart burn, Indigestion, Pain, Pain (5/10/12), Suicide attempt ([facility]), and Unspecified disorder of thyroid.  MEDS:   Current Outpatient Medications:   •  pravastatin (PRAVACHOL) 40 MG tablet, Take 1 Tab by mouth every bedtime., Disp: 90 Tab, Rfl: 3  •  traZODone (DESYREL) 150 MG Tab, TAKE 1 TABLET BY MOUTH ONCE DAILY AT BEDTIME, Disp: 90 Tab, Rfl: 3  •  omeprazole (PRILOSEC) 20 MG delayed-release capsule, Take 1 Cap by mouth 2 times daily, before breakfast and dinner., Disp: 180 Cap, Rfl: 3  •  levothyroxine (SYNTHROID) 100 MCG Tab, TAKE 1 TABLET BY MOUTH EVERY DAY IN THE MORNING ON AN EMPTY STOMACH, Disp: 90 Tab, Rfl: 3  •  therapeutic multivitamin-minerals (THERAGRAN-M) Tab, Take 1 Tab by mouth every day., Disp: , Rfl:   •  MAGNESIUM PO, Take 1 Cap by mouth every evening., Disp: , Rfl:   •  Inulin (FIBER CHOICE PO), Take 2 Caps by mouth every day., Disp: , Rfl:   •  Iron-Vitamin C (IRON 100/C PO), Take 1 Tab by mouth every evening., Disp: , Rfl:   •  VITAMIN D, CHOLECALCIFEROL, PO, Take 1 Cap by mouth every day., Disp: , Rfl:   •  escitalopram (LEXAPRO) 10 MG Tab, Take 10 mg by mouth every day., Disp: , Rfl: 1  •  hydrOXYzine HCl (ATARAX) 25 MG Tab, Take 1 Tab by mouth 1 time daily as needed for Anxiety (try first for acute anxiety before propranolol). (Patient not taking: Reported on 1/11/2020), Disp: 10 Tab, Rfl: 0  •  propranolol (INDERAL) 10 MG Tab, Take 1 Tab by mouth 1 time daily as needed (anxiety). (Patient not taking: Reported on 1/11/2020), Disp: 10 Tab, Rfl: 0  •  loratadine (CLARITIN) 10 MG Tab, Take 10 mg by mouth every day., Disp: , Rfl:   •  glucosamine Sulfate 500 MG Cap, Take 500 mg by mouth 2 times a day, with meals., Disp: , Rfl:   •  cyanocobalamin (VITAMIN B-12) 100 MCG Tab, Take 100 mcg by mouth every day., Disp: , Rfl:     Current Facility-Administered Medications:   •  ipratropium-albuterol (DUONEB) nebulizer solution, 3 mL, Nebulization, Once,  Lexus [employer] P.A.-C.  ALLERGIES:   Allergies   Allergen Reactions   • Nkda [No Known Drug Allergy]      SURGHX:   Past Surgical History:   Procedure Laterality Date   • ANKLE ORIF  5/27/2014    Performed by Ian Escalera M.D. at SURGERY [facility]S   • TIBIA PLATEAU ORIF  5/24/2014    Performed by Ian Escalera M.D. at SURGERY [facility]   • HIP ARTHROSCOPY  5/18/2012    Performed by MERARY SPENCER at SURGERY [facility]   • ACETABULAR OSTEOTOMY  5/18/2012    Performed by MERARY SPENCER at [facility]   • SYNOVECTOMY  5/18/2012    Performed by MERARY SPENCER at [facility]   • HIP ARTHROPLASTY TOTAL  8/15/2011    Performed by MERARY SPENCER at [facility]   • RECOVERY  8/8/2011    Performed by SURGERY, IR-RECOVERY at SURGERY SAME DAY [facility] ORS   • HIP ARTHROSCOPY  3/9/2011    right; Performed by MERARY SPENCER at [facility]   • KNEE ARTHROSCOPY  6/2010    right   • DEBRIDEMENT  11/23/2009    right   • HIP ARTHROSCOPY  11/23/2009    right   • HAND SURGERY  2007    cyst excision and debridement left hand   • HERNIA REP ING BILAT  2005    and umbilical hernia repair   • ROTATOR CUFF REPAIR  2004    left   • NERVE ULNAR TRANSFER  2001    right   • OTHER ORTHOPEDIC SURGERY  1979    left hand and small ring finger     SOCHX:  reports that he has never smoked. He has never used smokeless tobacco. He reports that he does not drink alcohol or use drugs.  FH: Family history was reviewed, no pertinent findings to report     Objective:     /60 (BP Location: Left arm, Patient Position: Sitting, BP Cuff Size: Adult long)   Pulse 94   Temp 36.5 °C (97.7 °F) (Temporal)   Resp 16   Ht 1.829 m (6')   Wt 95.8 kg (211 lb 3.2 oz)   SpO2 96%   BMI 28.64 kg/m²      Physical Exam  Constitutional:       Appearance: He is well-developed.   HENT:      Head: Normocephalic and atraumatic.      Right Ear: Tympanic membrane, ear canal  and external ear normal.      Left Ear: Tympanic membrane, ear canal and external ear normal.      Nose: Mucosal edema, congestion and rhinorrhea present. Rhinorrhea is clear.      Right Sinus: No maxillary sinus tenderness or frontal sinus tenderness.      Left Sinus: No maxillary sinus tenderness or frontal sinus tenderness.      Mouth/Throat:      Lips: Pink.      Mouth: Mucous membranes are moist.      Comments: Postnasal drip noted in the posterior oropharynx  Eyes:      Conjunctiva/sclera: Conjunctivae normal.      Pupils: Pupils are equal, round, and reactive to light.   Neck:      Musculoskeletal: Normal range of motion.   Cardiovascular:      Rate and Rhythm: Normal rate and regular rhythm.      Heart sounds: Normal heart sounds. No murmur.   Pulmonary:      Effort: Pulmonary effort is normal.      Breath sounds: Normal breath sounds. No decreased breath sounds, wheezing, rhonchi or rales.   Lymphadenopathy:      Cervical: No cervical adenopathy.   Skin:     General: Skin is warm and dry.      Capillary Refill: Capillary refill takes less than 2 seconds.   Neurological:      Mental Status: He is alert and oriented to person, place, and time.   Psychiatric:         Behavior: Behavior normal.         Judgment: Judgment normal.            Assessment/Plan:     1. Cough  - ipratropium-albuterol (DUONEB) nebulizer solution  - albuterol (PROVENTIL) 2.5mg/3ml Nebu Soln solution for nebulization; 3 mL by Nebulization route every four hours as needed for Shortness of Breath.  Dispense: 30 Bullet; Refill: 0  - methylPREDNISolone (MEDROL DOSEPAK) 4 MG Tablet Therapy Pack; Take as directed  Dispense: 1 Package; Refill: 0  - benzonatate (TESSALON) 200 MG capsule; Take 1 Cap by mouth 3 times a day as needed for Cough.  Dispense: 30 Cap; Refill: 0    2. Post-nasal drip  - methylPREDNISolone (MEDROL DOSEPAK) 4 MG Tablet Therapy Pack; Take as directed  Dispense: 1 Package; Refill: 0  - fluticasone (FLONASE) 50 MCG/ACT nasal  spray; Spray 1 Spray in nose every day.  Dispense: 1 Bottle; Refill: 0                Differential Diagnosis, natural history, and supportive care discussed. Return to the Urgent Care or follow up with your PCP if symptoms fail to resolve, or for any new or worsening symptoms. Emergency room precautions discussed. Patient and/or family appears understanding of information.

## 2020-01-31 ENCOUNTER — HOSPITAL ENCOUNTER (OUTPATIENT)
Dept: LAB | Facility: MEDICAL CENTER | Age: 60
End: 2020-01-31
Attending: FAMILY MEDICINE
Payer: COMMERCIAL

## 2020-01-31 LAB
25(OH)D3 SERPL-MCNC: 60 NG/ML (ref 30–100)
CHOLEST SERPL-MCNC: 161 MG/DL (ref 100–199)
FASTING STATUS PATIENT QL REPORTED: NORMAL
FOLATE SERPL-MCNC: >24 NG/ML
HDLC SERPL-MCNC: 35 MG/DL
LDLC SERPL CALC-MCNC: 48 MG/DL
T3FREE SERPL-MCNC: 3.71 PG/ML (ref 2.4–4.2)
T4 FREE SERPL-MCNC: 0.81 NG/DL (ref 0.53–1.43)
TRIGL SERPL-MCNC: 388 MG/DL (ref 0–149)
TSH SERPL DL<=0.005 MIU/L-ACNC: 1.22 UIU/ML (ref 0.38–5.33)
VIT B12 SERPL-MCNC: 380 PG/ML (ref 211–911)

## 2020-01-31 PROCEDURE — 80061 LIPID PANEL: CPT

## 2020-01-31 PROCEDURE — 36415 COLL VENOUS BLD VENIPUNCTURE: CPT

## 2020-01-31 PROCEDURE — 82607 VITAMIN B-12: CPT

## 2020-01-31 PROCEDURE — 86800 THYROGLOBULIN ANTIBODY: CPT

## 2020-01-31 PROCEDURE — 84481 FREE ASSAY (FT-3): CPT

## 2020-01-31 PROCEDURE — 84443 ASSAY THYROID STIM HORMONE: CPT

## 2020-01-31 PROCEDURE — 84439 ASSAY OF FREE THYROXINE: CPT

## 2020-01-31 PROCEDURE — 84403 ASSAY OF TOTAL TESTOSTERONE: CPT

## 2020-01-31 PROCEDURE — 82746 ASSAY OF FOLIC ACID SERUM: CPT

## 2020-01-31 PROCEDURE — 82306 VITAMIN D 25 HYDROXY: CPT

## 2020-01-31 PROCEDURE — 84402 ASSAY OF FREE TESTOSTERONE: CPT

## 2020-01-31 PROCEDURE — 84270 ASSAY OF SEX HORMONE GLOBUL: CPT

## 2020-02-01 LAB
SHBG SERPL-SCNC: 31 NMOL/L (ref 11–80)
TESTOST FREE MFR SERPL: 1.9 % (ref 1.6–2.9)
TESTOST FREE SERPL-MCNC: 62 PG/ML (ref 47–244)
TESTOST SERPL-MCNC: 332 NG/DL (ref 300–890)
THYROGLOB AB SERPL-ACNC: <0.9 IU/ML (ref 0–4)

## 2020-10-19 ENCOUNTER — HOSPITAL ENCOUNTER (OUTPATIENT)
Dept: LAB | Facility: MEDICAL CENTER | Age: 60
End: 2020-10-19
Attending: NURSE PRACTITIONER
Payer: COMMERCIAL

## 2020-10-19 LAB
BASOPHILS # BLD AUTO: 0.4 % (ref 0–1.8)
BASOPHILS # BLD: 0.02 K/UL (ref 0–0.12)
EOSINOPHIL # BLD AUTO: 0.03 K/UL (ref 0–0.51)
EOSINOPHIL NFR BLD: 0.6 % (ref 0–6.9)
ERYTHROCYTE [DISTWIDTH] IN BLOOD BY AUTOMATED COUNT: 45 FL (ref 35.9–50)
HCT VFR BLD AUTO: 52.5 % (ref 42–52)
HGB BLD-MCNC: 16.5 G/DL (ref 14–18)
IMM GRANULOCYTES # BLD AUTO: 0.03 K/UL (ref 0–0.11)
IMM GRANULOCYTES NFR BLD AUTO: 0.6 % (ref 0–0.9)
LYMPHOCYTES # BLD AUTO: 1.21 K/UL (ref 1–4.8)
LYMPHOCYTES NFR BLD: 24.4 % (ref 22–41)
MCH RBC QN AUTO: 27.2 PG (ref 27–33)
MCHC RBC AUTO-ENTMCNC: 31.4 G/DL (ref 33.7–35.3)
MCV RBC AUTO: 86.6 FL (ref 81.4–97.8)
MONOCYTES # BLD AUTO: 0.28 K/UL (ref 0–0.85)
MONOCYTES NFR BLD AUTO: 5.7 % (ref 0–13.4)
NEUTROPHILS # BLD AUTO: 3.38 K/UL (ref 1.82–7.42)
NEUTROPHILS NFR BLD: 68.3 % (ref 44–72)
NRBC # BLD AUTO: 0 K/UL
NRBC BLD-RTO: 0 /100 WBC
PLATELET # BLD AUTO: 161 K/UL (ref 164–446)
PMV BLD AUTO: 10.8 FL (ref 9–12.9)
RBC # BLD AUTO: 6.06 M/UL (ref 4.7–6.1)
WBC # BLD AUTO: 5 K/UL (ref 4.8–10.8)

## 2020-10-19 PROCEDURE — 86256 FLUORESCENT ANTIBODY TITER: CPT

## 2020-10-19 PROCEDURE — 83516 IMMUNOASSAY NONANTIBODY: CPT | Mod: 91

## 2020-10-19 PROCEDURE — 36415 COLL VENOUS BLD VENIPUNCTURE: CPT

## 2020-10-19 PROCEDURE — 85025 COMPLETE CBC W/AUTO DIFF WBC: CPT

## 2020-10-19 PROCEDURE — 87493 C DIFF AMPLIFIED PROBE: CPT

## 2020-10-19 PROCEDURE — 82784 ASSAY IGA/IGD/IGG/IGM EACH: CPT

## 2020-10-20 LAB
C DIFF DNA SPEC QL NAA+PROBE: NEGATIVE
C DIFF TOX GENS STL QL NAA+PROBE: NEGATIVE
IGA SERPL-MCNC: 72 MG/DL (ref 68–408)

## 2020-10-21 LAB — TTG IGA SER IA-ACNC: 26 U/ML (ref 0–3)

## 2020-10-22 LAB — GLIADIN IGA SER IA-ACNC: 78 UNITS (ref 0–19)

## 2020-10-25 LAB — ENDOMYSIUM IGA TITR SER IF: ABNORMAL {TITER}

## 2020-12-08 ENCOUNTER — HOSPITAL ENCOUNTER (OUTPATIENT)
Dept: RADIOLOGY | Facility: MEDICAL CENTER | Age: 60
End: 2020-12-08
Attending: PAIN MEDICINE
Payer: COMMERCIAL

## 2020-12-08 DIAGNOSIS — M54.16 LUMBAR RADICULOPATHY: ICD-10-CM

## 2020-12-08 PROCEDURE — 72148 MRI LUMBAR SPINE W/O DYE: CPT

## 2021-01-08 ENCOUNTER — HOSPITAL ENCOUNTER (OUTPATIENT)
Dept: RADIOLOGY | Facility: MEDICAL CENTER | Age: 61
End: 2021-01-08
Attending: NURSE PRACTITIONER
Payer: COMMERCIAL

## 2021-01-08 ENCOUNTER — OFFICE VISIT (OUTPATIENT)
Dept: URGENT CARE | Facility: PHYSICIAN GROUP | Age: 61
End: 2021-01-08
Payer: COMMERCIAL

## 2021-01-08 VITALS
WEIGHT: 205 LBS | BODY MASS INDEX: 28.7 KG/M2 | RESPIRATION RATE: 15 BRPM | TEMPERATURE: 97.8 F | OXYGEN SATURATION: 98 % | HEIGHT: 71 IN | HEART RATE: 70 BPM | DIASTOLIC BLOOD PRESSURE: 82 MMHG | SYSTOLIC BLOOD PRESSURE: 120 MMHG

## 2021-01-08 DIAGNOSIS — M25.531 RIGHT WRIST PAIN: ICD-10-CM

## 2021-01-08 PROCEDURE — 99213 OFFICE O/P EST LOW 20 MIN: CPT | Performed by: NURSE PRACTITIONER

## 2021-01-08 PROCEDURE — 73110 X-RAY EXAM OF WRIST: CPT | Mod: RT

## 2021-01-08 ASSESSMENT — ENCOUNTER SYMPTOMS
NERVOUS/ANXIOUS: 0
EYE PAIN: 0
ABDOMINAL PAIN: 0
MYALGIAS: 0
HEADACHES: 0
NAUSEA: 0
ORTHOPNEA: 0
FEVER: 0
FALLS: 1
WEAKNESS: 0
CHILLS: 0
COUGH: 0
SORE THROAT: 0
DIZZINESS: 0
VOMITING: 0
SHORTNESS OF BREATH: 0

## 2021-01-08 ASSESSMENT — PAIN SCALES - GENERAL: PAINLEVEL: 10=SEVERE PAIN

## 2021-01-08 ASSESSMENT — FIBROSIS 4 INDEX: FIB4 SCORE: 1.69

## 2021-01-08 NOTE — PROGRESS NOTES
Subjective:   Rafy Morrison is a 60 y.o. male who presents for Wrist Injury (right, fell this am)       Wrist Injury   The incident occurred 1 to 3 hours ago. The incident occurred at home. The injury mechanism was a fall. The pain is present in the right hand. The quality of the pain is described as aching and stabbing. The pain does not radiate. The pain is moderate. The pain has been constant since the incident. Pertinent negatives include no chest pain. The symptoms are aggravated by movement and palpation. He has tried immobilization and NSAIDs for the symptoms. The treatment provided no relief.     Pt presents for evaluation of a new problem, reports having severe osteoarthritis in his right knee, which gave out this morning and he fell on his outstretched right hand.  Had immediate pain and swelling with decreased range of motion since that time.  Is right-handed.    Review of Systems   Constitutional: Negative for chills, fever and malaise/fatigue.   HENT: Negative for congestion and sore throat.    Eyes: Negative for pain.   Respiratory: Negative for cough and shortness of breath.    Cardiovascular: Negative for chest pain, orthopnea and leg swelling.   Gastrointestinal: Negative for abdominal pain, nausea and vomiting.   Genitourinary: Negative for dysuria.   Musculoskeletal: Positive for falls and joint pain. Negative for myalgias.   Skin: Negative for rash.   Neurological: Negative for dizziness, weakness and headaches.   Psychiatric/Behavioral: The patient is not nervous/anxious.    All other systems reviewed and are negative.      MEDS:   Current Outpatient Medications:   •  albuterol (PROVENTIL) 2.5mg/3ml Nebu Soln solution for nebulization, 3 mL by Nebulization route every four hours as needed for Shortness of Breath., Disp: 30 Bullet, Rfl: 0  •  fluticasone (FLONASE) 50 MCG/ACT nasal spray, Spray 1 Spray in nose every day., Disp: 1 Bottle, Rfl: 0  •  escitalopram (LEXAPRO) 10 MG Tab, Take 10 mg by  mouth every day., Disp: , Rfl: 1  •  hydrOXYzine HCl (ATARAX) 25 MG Tab, Take 1 Tab by mouth 1 time daily as needed for Anxiety (try first for acute anxiety before propranolol)., Disp: 10 Tab, Rfl: 0  •  pravastatin (PRAVACHOL) 40 MG tablet, Take 1 Tab by mouth every bedtime., Disp: 90 Tab, Rfl: 3  •  traZODone (DESYREL) 150 MG Tab, TAKE 1 TABLET BY MOUTH ONCE DAILY AT BEDTIME, Disp: 90 Tab, Rfl: 3  •  omeprazole (PRILOSEC) 20 MG delayed-release capsule, Take 1 Cap by mouth 2 times daily, before breakfast and dinner., Disp: 180 Cap, Rfl: 3  •  levothyroxine (SYNTHROID) 100 MCG Tab, TAKE 1 TABLET BY MOUTH EVERY DAY IN THE MORNING ON AN EMPTY STOMACH, Disp: 90 Tab, Rfl: 3  •  loratadine (CLARITIN) 10 MG Tab, Take 10 mg by mouth every day., Disp: , Rfl:   •  therapeutic multivitamin-minerals (THERAGRAN-M) Tab, Take 1 Tab by mouth every day., Disp: , Rfl:   •  MAGNESIUM PO, Take 1 Cap by mouth every evening., Disp: , Rfl:   •  glucosamine Sulfate 500 MG Cap, Take 500 mg by mouth 2 times a day, with meals., Disp: , Rfl:   •  Iron-Vitamin C (IRON 100/C PO), Take 1 Tab by mouth every evening., Disp: , Rfl:   •  cyanocobalamin (VITAMIN B-12) 100 MCG Tab, Take 100 mcg by mouth every day., Disp: , Rfl:   •  VITAMIN D, CHOLECALCIFEROL, PO, Take 1 Cap by mouth every day., Disp: , Rfl:   •  methylPREDNISolone (MEDROL DOSEPAK) 4 MG Tablet Therapy Pack, Take as directed (Patient not taking: Reported on 1/8/2021), Disp: 1 Package, Rfl: 0  •  benzonatate (TESSALON) 200 MG capsule, Take 1 Cap by mouth 3 times a day as needed for Cough. (Patient not taking: Reported on 1/8/2021), Disp: 30 Cap, Rfl: 0  •  propranolol (INDERAL) 10 MG Tab, Take 1 Tab by mouth 1 time daily as needed (anxiety). (Patient not taking: Reported on 1/11/2020), Disp: 10 Tab, Rfl: 0  •  Inulin (FIBER CHOICE PO), Take 2 Caps by mouth every day., Disp: , Rfl:   ALLERGIES:   Allergies   Allergen Reactions   • Nkda [No Known Drug Allergy]        Patient's PMH, SocHx,  "SurgHx, FamHx, Drug allergies and medications were reviewed.     Objective:   /82   Pulse 70   Temp 36.6 °C (97.8 °F)   Resp 15   Ht 1.803 m (5' 11\")   Wt 93 kg (205 lb)   SpO2 98%   BMI 28.59 kg/m²     Physical Exam  Vitals signs and nursing note reviewed.   Constitutional:       General: He is awake.      Appearance: Normal appearance. He is well-developed.   HENT:      Head: Normocephalic and atraumatic.      Right Ear: External ear normal.      Left Ear: External ear normal.      Nose: Nose normal.      Mouth/Throat:      Lips: Pink.      Mouth: Mucous membranes are moist.      Pharynx: Oropharynx is clear.   Eyes:      General: Lids are normal.      Extraocular Movements: Extraocular movements intact.      Conjunctiva/sclera: Conjunctivae normal.   Neck:      Musculoskeletal: Normal range of motion.   Cardiovascular:      Rate and Rhythm: Normal rate and regular rhythm.   Pulmonary:      Effort: Pulmonary effort is normal.   Musculoskeletal:      Right wrist: He exhibits decreased range of motion, tenderness, bony tenderness and swelling. He exhibits no crepitus.   Skin:     General: Skin is warm and dry.   Neurological:      Mental Status: He is alert and oriented to person, place, and time.   Psychiatric:         Mood and Affect: Mood normal.         Behavior: Behavior normal. Behavior is cooperative.         Thought Content: Thought content normal.         Judgment: Judgment normal.       Narrative & Impression        1/8/2021 9:10 AM     HISTORY/REASON FOR EXAM:  Pain/Deformity Following Trauma        TECHNIQUE/EXAM DESCRIPTION AND NUMBER OF VIEWS:  4 views of the RIGHT wrist.     COMPARISON: None     FINDINGS:  There is no evidence of fracture or dislocation. There is radiocarpal joint space narrowing. Mild degenerative changes of the first carpometacarpal joint, first MCP joint and interphalangeal joint of the first digit. No osseous erosion is identified.     IMPRESSION:     No evidence of " acute fracture or dislocation.     Degenerative changes.                Last Resulted: 01/08/21  9:22 AM            Assessment/Plan:   Assessment    1. Right wrist pain  - DX-WRIST-COMPLETE 3+ RIGHT; Future    Reviewed today's test results that were completed in the clinic.  Begin medications as listed.  Supportive care options discussed and reviewed.  Discussed the use of OTC medications as per package directions on a PRN basis, to include alternating Tylenol and Advil as needed for pain.  RICE therapies discussed, wrist splint given to patient in clinic today.      Return to urgent care clinic or PCP if current symptoms do not improve and/or worsening symptoms occur.  Refer to sports medicine for follow-up in approximately 1 week.  Differential diagnosis, natural history, and indications for immediate follow-up were discussed.  Advised of signs and symptoms which would warrant further evaluation and /or emergent evaluation in ED.  All questions answered and the patient agrees to the plan of care.       Please note that this dictation was created using voice recognition software. I have made every reasonable attempt to correct obvious errors, but I expect that there may be errors of grammar and possibly content that I did not discover before finalizing the note.

## 2021-01-29 ENCOUNTER — HOSPITAL ENCOUNTER (OUTPATIENT)
Dept: RADIOLOGY | Facility: MEDICAL CENTER | Age: 61
End: 2021-01-29
Attending: ORTHOPAEDIC SURGERY
Payer: COMMERCIAL

## 2021-01-29 DIAGNOSIS — M17.9 OSTEOARTHRITIS OF KNEE, UNSPECIFIED: ICD-10-CM

## 2021-01-29 PROCEDURE — 73700 CT LOWER EXTREMITY W/O DYE: CPT | Mod: RT

## 2021-02-25 ENCOUNTER — HOSPITAL ENCOUNTER (OUTPATIENT)
Dept: RADIOLOGY | Facility: MEDICAL CENTER | Age: 61
End: 2021-02-25
Attending: NURSE PRACTITIONER
Payer: COMMERCIAL

## 2021-02-25 ENCOUNTER — OFFICE VISIT (OUTPATIENT)
Dept: URGENT CARE | Facility: PHYSICIAN GROUP | Age: 61
End: 2021-02-25
Payer: COMMERCIAL

## 2021-02-25 VITALS
BODY MASS INDEX: 29.4 KG/M2 | RESPIRATION RATE: 17 BRPM | WEIGHT: 210 LBS | SYSTOLIC BLOOD PRESSURE: 110 MMHG | OXYGEN SATURATION: 98 % | HEART RATE: 70 BPM | TEMPERATURE: 98.1 F | DIASTOLIC BLOOD PRESSURE: 76 MMHG | HEIGHT: 71 IN

## 2021-02-25 DIAGNOSIS — S16.1XXA STRAIN OF NECK MUSCLE, INITIAL ENCOUNTER: ICD-10-CM

## 2021-02-25 DIAGNOSIS — S39.012A STRAIN OF LUMBAR REGION, INITIAL ENCOUNTER: ICD-10-CM

## 2021-02-25 PROCEDURE — 72040 X-RAY EXAM NECK SPINE 2-3 VW: CPT

## 2021-02-25 PROCEDURE — 99213 OFFICE O/P EST LOW 20 MIN: CPT | Performed by: NURSE PRACTITIONER

## 2021-02-25 RX ORDER — CYCLOBENZAPRINE HCL 10 MG
10 TABLET ORAL 3 TIMES DAILY PRN
Qty: 30 TABLET | Refills: 0 | Status: SHIPPED | OUTPATIENT
Start: 2021-02-25 | End: 2021-03-23

## 2021-02-25 ASSESSMENT — PAIN SCALES - GENERAL: PAINLEVEL: 10=SEVERE PAIN

## 2021-02-25 ASSESSMENT — FIBROSIS 4 INDEX: FIB4 SCORE: 1.69

## 2021-02-25 ASSESSMENT — ENCOUNTER SYMPTOMS: BACK PAIN: 1
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
## 2021-03-15 DIAGNOSIS — Z23 NEED FOR VACCINATION: ICD-10-CM

## 2021-03-23 ENCOUNTER — PRE-ADMISSION TESTING (OUTPATIENT)
Dept: ADMISSIONS | Facility: MEDICAL CENTER | Age: 61
End: 2021-03-23
Attending: ORTHOPAEDIC SURGERY
Payer: COMMERCIAL

## 2021-03-23 DIAGNOSIS — Z01.812 PRE-OPERATIVE LABORATORY EXAMINATION: ICD-10-CM

## 2021-03-23 DIAGNOSIS — Z01.810 PRE-OPERATIVE CARDIOVASCULAR EXAMINATION: ICD-10-CM

## 2021-03-23 LAB
ANION GAP SERPL CALC-SCNC: 9 MMOL/L (ref 7–16)
BUN SERPL-MCNC: 14 MG/DL (ref 8–22)
CALCIUM SERPL-MCNC: 8.6 MG/DL (ref 8.4–10.2)
CHLORIDE SERPL-SCNC: 107 MMOL/L (ref 96–112)
CO2 SERPL-SCNC: 24 MMOL/L (ref 20–33)
CREAT SERPL-MCNC: 0.88 MG/DL (ref 0.5–1.4)
EKG IMPRESSION: NORMAL
ERYTHROCYTE [DISTWIDTH] IN BLOOD BY AUTOMATED COUNT: 44.4 FL (ref 35.9–50)
GLUCOSE SERPL-MCNC: 100 MG/DL (ref 65–99)
HCT VFR BLD AUTO: 43.2 % (ref 42–52)
HGB BLD-MCNC: 13.8 G/DL (ref 14–18)
MCH RBC QN AUTO: 27 PG (ref 27–33)
MCHC RBC AUTO-ENTMCNC: 31.9 G/DL (ref 33.7–35.3)
MCV RBC AUTO: 84.4 FL (ref 81.4–97.8)
PLATELET # BLD AUTO: 136 K/UL (ref 164–446)
PMV BLD AUTO: 10 FL (ref 9–12.9)
POTASSIUM SERPL-SCNC: 4.2 MMOL/L (ref 3.6–5.5)
RBC # BLD AUTO: 5.12 M/UL (ref 4.7–6.1)
SODIUM SERPL-SCNC: 140 MMOL/L (ref 135–145)
WBC # BLD AUTO: 5.7 K/UL (ref 4.8–10.8)

## 2021-03-23 PROCEDURE — 36415 COLL VENOUS BLD VENIPUNCTURE: CPT

## 2021-03-23 PROCEDURE — 87640 STAPH A DNA AMP PROBE: CPT

## 2021-03-23 PROCEDURE — 87641 MR-STAPH DNA AMP PROBE: CPT

## 2021-03-23 PROCEDURE — 93005 ELECTROCARDIOGRAM TRACING: CPT

## 2021-03-23 PROCEDURE — 80048 BASIC METABOLIC PNL TOTAL CA: CPT

## 2021-03-23 PROCEDURE — 93010 ELECTROCARDIOGRAM REPORT: CPT | Performed by: INTERNAL MEDICINE

## 2021-03-23 PROCEDURE — 85027 COMPLETE CBC AUTOMATED: CPT

## 2021-03-23 RX ORDER — LIFITEGRAST 50 MG/ML
1 SOLUTION/ DROPS OPHTHALMIC
COMMUNITY
Start: 2021-02-22

## 2021-03-23 RX ORDER — GABAPENTIN 100 MG/1
100 CAPSULE ORAL
COMMUNITY

## 2021-03-23 RX ORDER — CETIRIZINE 2.4 MG/ML
1 SOLUTION/ DROPS OPHTHALMIC
COMMUNITY
Start: 2021-02-05

## 2021-03-23 RX ORDER — CEFAZOLIN SODIUM IN 0.9 % NACL 2 G/100 ML
2 PLASTIC BAG, INJECTION (ML) INTRAVENOUS ONCE
Status: CANCELLED | OUTPATIENT
Start: 2021-04-07 | End: 2021-04-07

## 2021-03-23 RX ORDER — IPRATROPIUM BROMIDE 21 UG/1
2 SPRAY, METERED NASAL
COMMUNITY
Start: 2021-02-02

## 2021-03-23 ASSESSMENT — FIBROSIS 4 INDEX: FIB4 SCORE: 1.69

## 2021-03-23 NOTE — PREPROCEDURE INSTRUCTIONS
Hx and meds reviewed, pre op instructions given, handouts reviewed, questions answered.  Pt instructed to continue regularly prescribed medications through day before surgery flonase, gabapentin, atarax if needed, atrovent nasal spray, synthroid, omeprazole, pravastatin, xidra eye gtts and zerviate eye gtts..Per anesthesia protocol pt instructed to take these medications with a sip of water the day of surgery. Anesthesia fasting guidelines reviewed with pt. Covid testing scheduled and pt aware to self isolate after test.

## 2021-03-23 NOTE — DISCHARGE PLANNING
DISCHARGE PLANNING NOTE - TOTAL JOINT    Procedure: Procedure(s):  ARTHROPLASTY, KNEE, TOTAL  Procedure Date: 4/7/2021  Insurance: Payor: MICHO[employer]ANO / Plan: [employer] / Product Type: *No Product type* /    Equipment currently available at home?  crutches, raised toilet seat and shower chair  Steps into the home?   Steps within the home?   Toilet height? Standard  Type of shower? walk-in shower  Who will be with you during your recovery? Spouse.  Is Outpatient Physical Therapy set up after surgery? Yes  Did you take the Total Joint Class and where? Yes NAON book given to pt today and he saw the video two weeks ago.  Planning same day discharge?Yes     This writer met with pt during his preadmission appointment. Pt states he has all needed equipment except Front-Wheel Walker. Choice form reviewed with patient and scanned into [employer]. Home safety checklist reviewed and copy given to pt. Pt educated to dc criteria. All questions answered and verbalizes understanding of all instructions. No dc needs identified at this time. Anticipate dc to home without barriers.

## 2021-03-24 LAB
SCCMEC + MECA PNL NOSE NAA+PROBE: NEGATIVE
SCCMEC + MECA PNL NOSE NAA+PROBE: NEGATIVE

## 2021-04-05 ENCOUNTER — PRE-ADMISSION TESTING (OUTPATIENT)
Dept: ADMISSIONS | Facility: MEDICAL CENTER | Age: 61
End: 2021-04-05
Attending: ORTHOPAEDIC SURGERY
Payer: COMMERCIAL

## 2021-04-05 DIAGNOSIS — Z01.812 PRE-OPERATIVE LABORATORY EXAMINATION: ICD-10-CM

## 2021-04-05 LAB
COVID ORDER STATUS COVID19: NORMAL
SARS-COV-2 RNA RESP QL NAA+PROBE: NOTDETECTED
SPECIMEN SOURCE: NORMAL

## 2021-04-05 PROCEDURE — U0003 INFECTIOUS AGENT DETECTION BY NUCLEIC ACID (DNA OR RNA); SEVERE ACUTE RESPIRATORY SYNDROME CORONAVIRUS 2 (SARS-COV-2) (CORONAVIRUS DISEASE [COVID-19]), AMPLIFIED PROBE TECHNIQUE, MAKING USE OF HIGH THROUGHPUT TECHNOLOGIES AS DESCRIBED BY CMS-2020-01-R: HCPCS

## 2021-04-05 PROCEDURE — U0005 INFEC AGEN DETEC AMPLI PROBE: HCPCS

## 2021-04-05 PROCEDURE — C9803 HOPD COVID-19 SPEC COLLECT: HCPCS

## 2021-04-06 ENCOUNTER — ANESTHESIA EVENT (OUTPATIENT)
Dept: SURGERY | Facility: MEDICAL CENTER | Age: 61
End: 2021-04-06
Payer: COMMERCIAL

## 2021-04-07 ENCOUNTER — HOSPITAL ENCOUNTER (OUTPATIENT)
Facility: MEDICAL CENTER | Age: 61
End: 2021-04-08
Attending: ORTHOPAEDIC SURGERY | Admitting: ORTHOPAEDIC SURGERY
Payer: COMMERCIAL

## 2021-04-07 ENCOUNTER — APPOINTMENT (OUTPATIENT)
Dept: RADIOLOGY | Facility: MEDICAL CENTER | Age: 61
End: 2021-04-07
Attending: ORTHOPAEDIC SURGERY
Payer: COMMERCIAL

## 2021-04-07 ENCOUNTER — ANESTHESIA (OUTPATIENT)
Dept: SURGERY | Facility: MEDICAL CENTER | Age: 61
End: 2021-04-07
Payer: COMMERCIAL

## 2021-04-07 DIAGNOSIS — M17.10 KNEE ARTHROPATHY: ICD-10-CM

## 2021-04-07 PROCEDURE — 64445 NJX AA&/STRD SCIATIC NRV IMG: CPT | Performed by: ORTHOPAEDIC SURGERY

## 2021-04-07 PROCEDURE — C1776 JOINT DEVICE (IMPLANTABLE): HCPCS | Performed by: ORTHOPAEDIC SURGERY

## 2021-04-07 PROCEDURE — A9270 NON-COVERED ITEM OR SERVICE: HCPCS | Performed by: STUDENT IN AN ORGANIZED HEALTH CARE EDUCATION/TRAINING PROGRAM

## 2021-04-07 PROCEDURE — 501838 HCHG SUTURE GENERAL: Performed by: ORTHOPAEDIC SURGERY

## 2021-04-07 PROCEDURE — A9270 NON-COVERED ITEM OR SERVICE: HCPCS | Performed by: ORTHOPAEDIC SURGERY

## 2021-04-07 PROCEDURE — 160035 HCHG PACU - 1ST 60 MINS PHASE I: Performed by: ORTHOPAEDIC SURGERY

## 2021-04-07 PROCEDURE — 160009 HCHG ANES TIME/MIN: Performed by: ORTHOPAEDIC SURGERY

## 2021-04-07 PROCEDURE — 700101 HCHG RX REV CODE 250: Performed by: STUDENT IN AN ORGANIZED HEALTH CARE EDUCATION/TRAINING PROGRAM

## 2021-04-07 PROCEDURE — 160048 HCHG OR STATISTICAL LEVEL 1-5: Performed by: ORTHOPAEDIC SURGERY

## 2021-04-07 PROCEDURE — G0378 HOSPITAL OBSERVATION PER HR: HCPCS

## 2021-04-07 PROCEDURE — 502000 HCHG MISC OR IMPLANTS RC 0278: Performed by: ORTHOPAEDIC SURGERY

## 2021-04-07 PROCEDURE — 94760 N-INVAS EAR/PLS OXIMETRY 1: CPT

## 2021-04-07 PROCEDURE — 160041 HCHG SURGERY MINUTES - EA ADDL 1 MIN LEVEL 4: Performed by: ORTHOPAEDIC SURGERY

## 2021-04-07 PROCEDURE — 160029 HCHG SURGERY MINUTES - 1ST 30 MINS LEVEL 4: Performed by: ORTHOPAEDIC SURGERY

## 2021-04-07 PROCEDURE — 700105 HCHG RX REV CODE 258: Performed by: ORTHOPAEDIC SURGERY

## 2021-04-07 PROCEDURE — 160002 HCHG RECOVERY MINUTES (STAT): Performed by: ORTHOPAEDIC SURGERY

## 2021-04-07 PROCEDURE — 700102 HCHG RX REV CODE 250 W/ 637 OVERRIDE(OP): Performed by: ORTHOPAEDIC SURGERY

## 2021-04-07 PROCEDURE — L8699 PROSTHETIC IMPLANT NOS: HCPCS | Performed by: ORTHOPAEDIC SURGERY

## 2021-04-07 PROCEDURE — 700111 HCHG RX REV CODE 636 W/ 250 OVERRIDE (IP): Performed by: STUDENT IN AN ORGANIZED HEALTH CARE EDUCATION/TRAINING PROGRAM

## 2021-04-07 PROCEDURE — 160036 HCHG PACU - EA ADDL 30 MINS PHASE I: Performed by: ORTHOPAEDIC SURGERY

## 2021-04-07 PROCEDURE — 700102 HCHG RX REV CODE 250 W/ 637 OVERRIDE(OP): Performed by: STUDENT IN AN ORGANIZED HEALTH CARE EDUCATION/TRAINING PROGRAM

## 2021-04-07 PROCEDURE — 700101 HCHG RX REV CODE 250: Performed by: ORTHOPAEDIC SURGERY

## 2021-04-07 PROCEDURE — 73560 X-RAY EXAM OF KNEE 1 OR 2: CPT | Mod: RT

## 2021-04-07 PROCEDURE — 64447 NJX AA&/STRD FEMORAL NRV IMG: CPT | Performed by: ORTHOPAEDIC SURGERY

## 2021-04-07 PROCEDURE — 502240 HCHG MISC OR SUPPLY RC 0272: Performed by: ORTHOPAEDIC SURGERY

## 2021-04-07 PROCEDURE — 502579 HCHG PACK, TOTAL KNEE: Performed by: ORTHOPAEDIC SURGERY

## 2021-04-07 PROCEDURE — 96374 THER/PROPH/DIAG INJ IV PUSH: CPT

## 2021-04-07 PROCEDURE — 700111 HCHG RX REV CODE 636 W/ 250 OVERRIDE (IP): Performed by: ORTHOPAEDIC SURGERY

## 2021-04-07 DEVICE — IMPLANTABLE DEVICE: Type: IMPLANTABLE DEVICE | Site: KNEE | Status: FUNCTIONAL

## 2021-04-07 DEVICE — CEMENT ORTHOPEDIC HV US  (10/PK): Type: IMPLANTABLE DEVICE | Site: KNEE | Status: FUNCTIONAL

## 2021-04-07 RX ORDER — DIPHENHYDRAMINE HYDROCHLORIDE 50 MG/ML
INJECTION INTRAMUSCULAR; INTRAVENOUS PRN
Status: DISCONTINUED | OUTPATIENT
Start: 2021-04-07 | End: 2021-04-07 | Stop reason: SURG

## 2021-04-07 RX ORDER — ROPIVACAINE HYDROCHLORIDE 5 MG/ML
INJECTION, SOLUTION EPIDURAL; INFILTRATION; PERINEURAL
Status: DISCONTINUED | OUTPATIENT
Start: 2021-04-07 | End: 2021-04-07 | Stop reason: HOSPADM

## 2021-04-07 RX ORDER — GABAPENTIN 100 MG/1
100 CAPSULE ORAL 2 TIMES DAILY
Status: DISCONTINUED | OUTPATIENT
Start: 2021-04-07 | End: 2021-04-08 | Stop reason: HOSPADM

## 2021-04-07 RX ORDER — SODIUM CHLORIDE 9 MG/ML
INJECTION, SOLUTION INTRAMUSCULAR; INTRAVENOUS; SUBCUTANEOUS
Status: DISCONTINUED | OUTPATIENT
Start: 2021-04-07 | End: 2021-04-07 | Stop reason: HOSPADM

## 2021-04-07 RX ORDER — MIDAZOLAM HYDROCHLORIDE 1 MG/ML
INJECTION INTRAMUSCULAR; INTRAVENOUS PRN
Status: DISCONTINUED | OUTPATIENT
Start: 2021-04-07 | End: 2021-04-07 | Stop reason: SURG

## 2021-04-07 RX ORDER — POLYETHYLENE GLYCOL 3350 17 G/17G
1 POWDER, FOR SOLUTION ORAL 2 TIMES DAILY PRN
Status: DISCONTINUED | OUTPATIENT
Start: 2021-04-07 | End: 2021-04-08 | Stop reason: HOSPADM

## 2021-04-07 RX ORDER — DOCUSATE SODIUM 100 MG/1
100 CAPSULE, LIQUID FILLED ORAL 2 TIMES DAILY
Status: DISCONTINUED | OUTPATIENT
Start: 2021-04-07 | End: 2021-04-08 | Stop reason: HOSPADM

## 2021-04-07 RX ORDER — CEFAZOLIN SODIUM 1 G/3ML
INJECTION, POWDER, FOR SOLUTION INTRAMUSCULAR; INTRAVENOUS PRN
Status: DISCONTINUED | OUTPATIENT
Start: 2021-04-07 | End: 2021-04-07 | Stop reason: SURG

## 2021-04-07 RX ORDER — OMEPRAZOLE 20 MG/1
20 CAPSULE, DELAYED RELEASE ORAL
Status: DISCONTINUED | OUTPATIENT
Start: 2021-04-07 | End: 2021-04-08 | Stop reason: HOSPADM

## 2021-04-07 RX ORDER — CEFAZOLIN SODIUM IN 0.9 % NACL 2 G/100 ML
2 PLASTIC BAG, INJECTION (ML) INTRAVENOUS ONCE
Status: DISCONTINUED | OUTPATIENT
Start: 2021-04-07 | End: 2021-04-07 | Stop reason: HOSPADM

## 2021-04-07 RX ORDER — EPINEPHRINE 1 MG/ML(1)
AMPUL (ML) INJECTION
Status: DISCONTINUED | OUTPATIENT
Start: 2021-04-07 | End: 2021-04-07 | Stop reason: HOSPADM

## 2021-04-07 RX ORDER — CELECOXIB 200 MG/1
200 CAPSULE ORAL ONCE
Status: COMPLETED | OUTPATIENT
Start: 2021-04-07 | End: 2021-04-07

## 2021-04-07 RX ORDER — OXYCODONE HCL 5 MG/5 ML
5 SOLUTION, ORAL ORAL
Status: COMPLETED | OUTPATIENT
Start: 2021-04-07 | End: 2021-04-07

## 2021-04-07 RX ORDER — ONDANSETRON 2 MG/ML
4 INJECTION INTRAMUSCULAR; INTRAVENOUS EVERY 4 HOURS PRN
Status: DISCONTINUED | OUTPATIENT
Start: 2021-04-07 | End: 2021-04-08 | Stop reason: HOSPADM

## 2021-04-07 RX ORDER — BUPIVACAINE HYDROCHLORIDE 2.5 MG/ML
INJECTION, SOLUTION EPIDURAL; INFILTRATION; INTRACAUDAL PRN
Status: DISCONTINUED | OUTPATIENT
Start: 2021-04-07 | End: 2021-04-07 | Stop reason: SURG

## 2021-04-07 RX ORDER — LEVOTHYROXINE SODIUM 0.1 MG/1
100 TABLET ORAL
Status: DISCONTINUED | OUTPATIENT
Start: 2021-04-07 | End: 2021-04-08 | Stop reason: HOSPADM

## 2021-04-07 RX ORDER — ENEMA 19; 7 G/133ML; G/133ML
1 ENEMA RECTAL
Status: DISCONTINUED | OUTPATIENT
Start: 2021-04-07 | End: 2021-04-08 | Stop reason: HOSPADM

## 2021-04-07 RX ORDER — DEXAMETHASONE SODIUM PHOSPHATE 4 MG/ML
INJECTION, SOLUTION INTRA-ARTICULAR; INTRALESIONAL; INTRAMUSCULAR; INTRAVENOUS; SOFT TISSUE PRN
Status: DISCONTINUED | OUTPATIENT
Start: 2021-04-07 | End: 2021-04-07 | Stop reason: SURG

## 2021-04-07 RX ORDER — DEXAMETHASONE SODIUM PHOSPHATE 4 MG/ML
4 INJECTION, SOLUTION INTRA-ARTICULAR; INTRALESIONAL; INTRAMUSCULAR; INTRAVENOUS; SOFT TISSUE
Status: DISCONTINUED | OUTPATIENT
Start: 2021-04-07 | End: 2021-04-08 | Stop reason: HOSPADM

## 2021-04-07 RX ORDER — KETOROLAC TROMETHAMINE 30 MG/ML
INJECTION, SOLUTION INTRAMUSCULAR; INTRAVENOUS
Status: DISCONTINUED | OUTPATIENT
Start: 2021-04-07 | End: 2021-04-07 | Stop reason: HOSPADM

## 2021-04-07 RX ORDER — AMOXICILLIN 250 MG
1 CAPSULE ORAL
Status: DISCONTINUED | OUTPATIENT
Start: 2021-04-07 | End: 2021-04-08

## 2021-04-07 RX ORDER — HALOPERIDOL 5 MG/ML
1 INJECTION INTRAMUSCULAR
Status: DISCONTINUED | OUTPATIENT
Start: 2021-04-07 | End: 2021-04-07 | Stop reason: HOSPADM

## 2021-04-07 RX ORDER — BISACODYL 10 MG
10 SUPPOSITORY, RECTAL RECTAL
Status: DISCONTINUED | OUTPATIENT
Start: 2021-04-07 | End: 2021-04-08 | Stop reason: HOSPADM

## 2021-04-07 RX ORDER — HYDROMORPHONE HYDROCHLORIDE 1 MG/ML
0.2 INJECTION, SOLUTION INTRAMUSCULAR; INTRAVENOUS; SUBCUTANEOUS
Status: DISCONTINUED | OUTPATIENT
Start: 2021-04-07 | End: 2021-04-07 | Stop reason: HOSPADM

## 2021-04-07 RX ORDER — TRAMADOL HYDROCHLORIDE 50 MG/1
50 TABLET ORAL EVERY 6 HOURS
Status: DISCONTINUED | OUTPATIENT
Start: 2021-04-07 | End: 2021-04-08 | Stop reason: HOSPADM

## 2021-04-07 RX ORDER — TRAMADOL HYDROCHLORIDE 50 MG/1
50 TABLET ORAL EVERY 6 HOURS
Qty: 40 TABLET | Refills: 0
Start: 2021-04-07 | End: 2021-04-17

## 2021-04-07 RX ORDER — CELECOXIB 200 MG/1
200 CAPSULE ORAL 2 TIMES DAILY PRN
Status: DISCONTINUED | OUTPATIENT
Start: 2021-04-12 | End: 2021-04-08 | Stop reason: HOSPADM

## 2021-04-07 RX ORDER — OXYCODONE HYDROCHLORIDE 5 MG/1
5 TABLET ORAL
Qty: 40 TABLET | Refills: 0
Start: 2021-04-07 | End: 2021-04-12

## 2021-04-07 RX ORDER — TRANEXAMIC ACID 100 MG/ML
INJECTION, SOLUTION INTRAVENOUS PRN
Status: DISCONTINUED | OUTPATIENT
Start: 2021-04-07 | End: 2021-04-07 | Stop reason: SURG

## 2021-04-07 RX ORDER — OXYCODONE HCL 5 MG/5 ML
10 SOLUTION, ORAL ORAL
Status: COMPLETED | OUTPATIENT
Start: 2021-04-07 | End: 2021-04-07

## 2021-04-07 RX ORDER — ACETAMINOPHEN 500 MG
1000 TABLET ORAL EVERY 6 HOURS
Qty: 30 TABLET | Refills: 0
Start: 2021-04-07

## 2021-04-07 RX ORDER — MORPHINE SULFATE 0.5 MG/ML
INJECTION, SOLUTION EPIDURAL; INTRATHECAL; INTRAVENOUS
Status: DISCONTINUED | OUTPATIENT
Start: 2021-04-07 | End: 2021-04-07 | Stop reason: HOSPADM

## 2021-04-07 RX ORDER — HYDROMORPHONE HYDROCHLORIDE 2 MG/ML
INJECTION, SOLUTION INTRAMUSCULAR; INTRAVENOUS; SUBCUTANEOUS PRN
Status: DISCONTINUED | OUTPATIENT
Start: 2021-04-07 | End: 2021-04-07 | Stop reason: SURG

## 2021-04-07 RX ORDER — ACETAMINOPHEN 500 MG
1000 TABLET ORAL EVERY 6 HOURS
Status: DISCONTINUED | OUTPATIENT
Start: 2021-04-07 | End: 2021-04-08 | Stop reason: HOSPADM

## 2021-04-07 RX ORDER — OXYCODONE HYDROCHLORIDE 10 MG/1
10 TABLET ORAL
Status: DISCONTINUED | OUTPATIENT
Start: 2021-04-07 | End: 2021-04-08 | Stop reason: HOSPADM

## 2021-04-07 RX ORDER — DIPHENHYDRAMINE HYDROCHLORIDE 50 MG/ML
12.5 INJECTION INTRAMUSCULAR; INTRAVENOUS
Status: DISCONTINUED | OUTPATIENT
Start: 2021-04-07 | End: 2021-04-07 | Stop reason: HOSPADM

## 2021-04-07 RX ORDER — MEPERIDINE HYDROCHLORIDE 25 MG/ML
12.5 INJECTION INTRAMUSCULAR; INTRAVENOUS; SUBCUTANEOUS
Status: DISCONTINUED | OUTPATIENT
Start: 2021-04-07 | End: 2021-04-07 | Stop reason: HOSPADM

## 2021-04-07 RX ORDER — OXYCODONE HCL 10 MG/1
10 TABLET, FILM COATED, EXTENDED RELEASE ORAL ONCE
Status: COMPLETED | OUTPATIENT
Start: 2021-04-07 | End: 2021-04-07

## 2021-04-07 RX ORDER — SODIUM CHLORIDE, SODIUM LACTATE, POTASSIUM CHLORIDE, CALCIUM CHLORIDE 600; 310; 30; 20 MG/100ML; MG/100ML; MG/100ML; MG/100ML
INJECTION, SOLUTION INTRAVENOUS CONTINUOUS
Status: DISCONTINUED | OUTPATIENT
Start: 2021-04-07 | End: 2021-04-07 | Stop reason: HOSPADM

## 2021-04-07 RX ORDER — ACETAMINOPHEN 500 MG
1000 TABLET ORAL EVERY 6 HOURS PRN
Status: DISCONTINUED | OUTPATIENT
Start: 2021-04-12 | End: 2021-04-08 | Stop reason: HOSPADM

## 2021-04-07 RX ORDER — MAGNESIUM SULFATE HEPTAHYDRATE 40 MG/ML
INJECTION, SOLUTION INTRAVENOUS PRN
Status: DISCONTINUED | OUTPATIENT
Start: 2021-04-07 | End: 2021-04-07 | Stop reason: SURG

## 2021-04-07 RX ORDER — DIPHENHYDRAMINE HYDROCHLORIDE 50 MG/ML
25 INJECTION INTRAMUSCULAR; INTRAVENOUS EVERY 6 HOURS PRN
Status: DISCONTINUED | OUTPATIENT
Start: 2021-04-07 | End: 2021-04-08 | Stop reason: HOSPADM

## 2021-04-07 RX ORDER — HYDROMORPHONE HYDROCHLORIDE 1 MG/ML
0.4 INJECTION, SOLUTION INTRAMUSCULAR; INTRAVENOUS; SUBCUTANEOUS
Status: DISCONTINUED | OUTPATIENT
Start: 2021-04-07 | End: 2021-04-07 | Stop reason: HOSPADM

## 2021-04-07 RX ORDER — ACETAMINOPHEN 500 MG
1000 TABLET ORAL ONCE
Status: COMPLETED | OUTPATIENT
Start: 2021-04-07 | End: 2021-04-07

## 2021-04-07 RX ORDER — HALOPERIDOL 5 MG/ML
1 INJECTION INTRAMUSCULAR EVERY 6 HOURS PRN
Status: DISCONTINUED | OUTPATIENT
Start: 2021-04-07 | End: 2021-04-08 | Stop reason: HOSPADM

## 2021-04-07 RX ORDER — DEXTROSE MONOHYDRATE, SODIUM CHLORIDE, AND POTASSIUM CHLORIDE 50; 1.49; 4.5 G/1000ML; G/1000ML; G/1000ML
INJECTION, SOLUTION INTRAVENOUS CONTINUOUS
Status: DISPENSED | OUTPATIENT
Start: 2021-04-07 | End: 2021-04-07

## 2021-04-07 RX ORDER — HYDROMORPHONE HYDROCHLORIDE 1 MG/ML
0.1 INJECTION, SOLUTION INTRAMUSCULAR; INTRAVENOUS; SUBCUTANEOUS
Status: DISCONTINUED | OUTPATIENT
Start: 2021-04-07 | End: 2021-04-07 | Stop reason: HOSPADM

## 2021-04-07 RX ORDER — AMOXICILLIN 250 MG
1 CAPSULE ORAL NIGHTLY
Status: DISCONTINUED | OUTPATIENT
Start: 2021-04-07 | End: 2021-04-08 | Stop reason: HOSPADM

## 2021-04-07 RX ORDER — OXYCODONE HYDROCHLORIDE 5 MG/1
5 TABLET ORAL
Status: DISCONTINUED | OUTPATIENT
Start: 2021-04-07 | End: 2021-04-08 | Stop reason: HOSPADM

## 2021-04-07 RX ORDER — HYDROMORPHONE HYDROCHLORIDE 1 MG/ML
0.5 INJECTION, SOLUTION INTRAMUSCULAR; INTRAVENOUS; SUBCUTANEOUS
Status: DISCONTINUED | OUTPATIENT
Start: 2021-04-07 | End: 2021-04-08 | Stop reason: HOSPADM

## 2021-04-07 RX ORDER — ONDANSETRON 2 MG/ML
4 INJECTION INTRAMUSCULAR; INTRAVENOUS
Status: DISCONTINUED | OUTPATIENT
Start: 2021-04-07 | End: 2021-04-07 | Stop reason: HOSPADM

## 2021-04-07 RX ORDER — SODIUM CHLORIDE, SODIUM LACTATE, POTASSIUM CHLORIDE, CALCIUM CHLORIDE 600; 310; 30; 20 MG/100ML; MG/100ML; MG/100ML; MG/100ML
INJECTION, SOLUTION INTRAVENOUS CONTINUOUS
Status: ACTIVE | OUTPATIENT
Start: 2021-04-07 | End: 2021-04-07

## 2021-04-07 RX ORDER — CELECOXIB 200 MG/1
200 CAPSULE ORAL 2 TIMES DAILY
Status: DISCONTINUED | OUTPATIENT
Start: 2021-04-07 | End: 2021-04-08 | Stop reason: HOSPADM

## 2021-04-07 RX ORDER — LIDOCAINE HYDROCHLORIDE 20 MG/ML
INJECTION, SOLUTION EPIDURAL; INFILTRATION; INTRACAUDAL; PERINEURAL PRN
Status: DISCONTINUED | OUTPATIENT
Start: 2021-04-07 | End: 2021-04-07 | Stop reason: SURG

## 2021-04-07 RX ORDER — SCOLOPAMINE TRANSDERMAL SYSTEM 1 MG/1
1 PATCH, EXTENDED RELEASE TRANSDERMAL
Status: DISCONTINUED | OUTPATIENT
Start: 2021-04-07 | End: 2021-04-08 | Stop reason: HOSPADM

## 2021-04-07 RX ADMIN — FENTANYL CITRATE 50 MCG: 50 INJECTION, SOLUTION INTRAMUSCULAR; INTRAVENOUS at 12:08

## 2021-04-07 RX ADMIN — ONDANSETRON 4 MG: 2 INJECTION INTRAMUSCULAR; INTRAVENOUS at 19:01

## 2021-04-07 RX ADMIN — OXYCODONE HYDROCHLORIDE 5 MG: 5 TABLET ORAL at 15:47

## 2021-04-07 RX ADMIN — HYDROMORPHONE HYDROCHLORIDE 0.5 MG: 2 INJECTION, SOLUTION INTRAMUSCULAR; INTRAVENOUS; SUBCUTANEOUS at 10:55

## 2021-04-07 RX ADMIN — ACETAMINOPHEN 1000 MG: 500 TABLET, FILM COATED ORAL at 23:26

## 2021-04-07 RX ADMIN — ACETAMINOPHEN 1000 MG: 500 TABLET, FILM COATED ORAL at 13:44

## 2021-04-07 RX ADMIN — CELECOXIB 200 MG: 200 CAPSULE ORAL at 08:41

## 2021-04-07 RX ADMIN — POVIDONE-IODINE 15 ML: 10 SOLUTION TOPICAL at 08:41

## 2021-04-07 RX ADMIN — POTASSIUM CHLORIDE, DEXTROSE MONOHYDRATE AND SODIUM CHLORIDE: 150; 5; 450 INJECTION, SOLUTION INTRAVENOUS at 13:45

## 2021-04-07 RX ADMIN — BUPIVACAINE HYDROCHLORIDE 15 ML: 2.5 INJECTION, SOLUTION EPIDURAL; INFILTRATION; INTRACAUDAL; PERINEURAL at 10:27

## 2021-04-07 RX ADMIN — BUPIVACAINE HYDROCHLORIDE 15 ML: 2.5 INJECTION, SOLUTION EPIDURAL; INFILTRATION; INTRACAUDAL; PERINEURAL at 10:23

## 2021-04-07 RX ADMIN — TRAMADOL HYDROCHLORIDE 50 MG: 50 TABLET, FILM COATED ORAL at 23:26

## 2021-04-07 RX ADMIN — FENTANYL CITRATE 25 MCG: 50 INJECTION, SOLUTION INTRAMUSCULAR; INTRAVENOUS at 12:48

## 2021-04-07 RX ADMIN — ACETAMINOPHEN 1000 MG: 500 TABLET, FILM COATED ORAL at 08:41

## 2021-04-07 RX ADMIN — DEXAMETHASONE SODIUM PHOSPHATE 1 MG: 4 INJECTION, SOLUTION INTRAMUSCULAR; INTRAVENOUS at 10:23

## 2021-04-07 RX ADMIN — FENTANYL CITRATE 50 MCG: 50 INJECTION, SOLUTION INTRAMUSCULAR; INTRAVENOUS at 12:04

## 2021-04-07 RX ADMIN — MIDAZOLAM HYDROCHLORIDE 2 MG: 1 INJECTION, SOLUTION INTRAMUSCULAR; INTRAVENOUS at 10:20

## 2021-04-07 RX ADMIN — DIPHENHYDRAMINE HYDROCHLORIDE 12.5 MG: 50 INJECTION, SOLUTION INTRAMUSCULAR; INTRAVENOUS at 11:38

## 2021-04-07 RX ADMIN — OMEPRAZOLE 20 MG: 20 CAPSULE, DELAYED RELEASE ORAL at 17:10

## 2021-04-07 RX ADMIN — DEXAMETHASONE SODIUM PHOSPHATE 1 MG: 4 INJECTION, SOLUTION INTRAMUSCULAR; INTRAVENOUS at 10:27

## 2021-04-07 RX ADMIN — HYDROMORPHONE HYDROCHLORIDE 0.5 MG: 2 INJECTION, SOLUTION INTRAMUSCULAR; INTRAVENOUS; SUBCUTANEOUS at 11:33

## 2021-04-07 RX ADMIN — HYDROMORPHONE HYDROCHLORIDE 0.5 MG: 2 INJECTION, SOLUTION INTRAMUSCULAR; INTRAVENOUS; SUBCUTANEOUS at 10:38

## 2021-04-07 RX ADMIN — TRANEXAMIC ACID 1000 MG: 100 INJECTION, SOLUTION INTRAVENOUS at 10:43

## 2021-04-07 RX ADMIN — DEXAMETHASONE SODIUM PHOSPHATE 6 MG: 4 INJECTION, SOLUTION INTRAMUSCULAR; INTRAVENOUS at 10:45

## 2021-04-07 RX ADMIN — CELECOXIB 200 MG: 200 CAPSULE ORAL at 17:09

## 2021-04-07 RX ADMIN — DOCUSATE SODIUM 100 MG: 100 CAPSULE, LIQUID FILLED ORAL at 17:10

## 2021-04-07 RX ADMIN — OXYCODONE HYDROCHLORIDE 10 MG: 5 SOLUTION ORAL at 12:02

## 2021-04-07 RX ADMIN — TRAMADOL HYDROCHLORIDE 50 MG: 50 TABLET, FILM COATED ORAL at 17:09

## 2021-04-07 RX ADMIN — PROPOFOL 200 MG: 10 INJECTION, EMULSION INTRAVENOUS at 10:39

## 2021-04-07 RX ADMIN — MAGNESIUM SULFATE IN WATER 4 G: 40 INJECTION, SOLUTION INTRAVENOUS at 10:56

## 2021-04-07 RX ADMIN — GABAPENTIN 100 MG: 100 CAPSULE ORAL at 17:09

## 2021-04-07 RX ADMIN — ACETAMINOPHEN 1000 MG: 500 TABLET, FILM COATED ORAL at 17:09

## 2021-04-07 RX ADMIN — LIDOCAINE HYDROCHLORIDE 20 MG: 20 INJECTION, SOLUTION EPIDURAL; INFILTRATION; INTRACAUDAL; PERINEURAL at 10:39

## 2021-04-07 RX ADMIN — SODIUM CHLORIDE, POTASSIUM CHLORIDE, SODIUM LACTATE AND CALCIUM CHLORIDE: 600; 310; 30; 20 INJECTION, SOLUTION INTRAVENOUS at 08:42

## 2021-04-07 RX ADMIN — FENTANYL CITRATE 50 MCG: 50 INJECTION, SOLUTION INTRAMUSCULAR; INTRAVENOUS at 12:19

## 2021-04-07 RX ADMIN — CEFAZOLIN 2 G: 1 INJECTION, POWDER, FOR SOLUTION INTRAVENOUS at 10:43

## 2021-04-07 RX ADMIN — OXYCODONE HYDROCHLORIDE 10 MG: 10 TABLET, FILM COATED, EXTENDED RELEASE ORAL at 08:41

## 2021-04-07 RX ADMIN — HYDROMORPHONE HYDROCHLORIDE 0.5 MG: 2 INJECTION, SOLUTION INTRAMUSCULAR; INTRAVENOUS; SUBCUTANEOUS at 10:45

## 2021-04-07 RX ADMIN — TRAMADOL HYDROCHLORIDE 50 MG: 50 TABLET, FILM COATED ORAL at 13:44

## 2021-04-07 RX ADMIN — FENTANYL CITRATE 25 MCG: 50 INJECTION, SOLUTION INTRAMUSCULAR; INTRAVENOUS at 12:34

## 2021-04-07 ASSESSMENT — LIFESTYLE VARIABLES
EVER FELT BAD OR GUILTY ABOUT YOUR DRINKING: NO
HAVE PEOPLE ANNOYED YOU BY CRITICIZING YOUR DRINKING: NO
ON A TYPICAL DAY WHEN YOU DRINK ALCOHOL HOW MANY DRINKS DO YOU HAVE: 0
TOTAL SCORE: 0
HOW MANY TIMES IN THE PAST YEAR HAVE YOU HAD 5 OR MORE DRINKS IN A DAY: 0
HAVE YOU EVER FELT YOU SHOULD CUT DOWN ON YOUR DRINKING: NO
AVERAGE NUMBER OF DAYS PER WEEK YOU HAVE A DRINK CONTAINING ALCOHOL: 0
TOTAL SCORE: 0
EVER HAD A DRINK FIRST THING IN THE MORNING TO STEADY YOUR NERVES TO GET RID OF A HANGOVER: NO
CONSUMPTION TOTAL: NEGATIVE
ALCOHOL_USE: NO
TOTAL SCORE: 0

## 2021-04-07 ASSESSMENT — PAIN DESCRIPTION - PAIN TYPE
TYPE: SURGICAL PAIN
TYPE: SURGICAL PAIN
TYPE: ACUTE PAIN;SURGICAL PAIN
TYPE: ACUTE PAIN

## 2021-04-07 ASSESSMENT — PATIENT HEALTH QUESTIONNAIRE - PHQ9
1. LITTLE INTEREST OR PLEASURE IN DOING THINGS: NOT AT ALL
SUM OF ALL RESPONSES TO PHQ9 QUESTIONS 1 AND 2: 0
2. FEELING DOWN, DEPRESSED, IRRITABLE, OR HOPELESS: NOT AT ALL

## 2021-04-07 NOTE — ANESTHESIA TIME REPORT
Anesthesia Start and Stop Event Times     Date Time Event    4/7/2021 1033 Ready for Procedure     1034 Anesthesia Start     1149 Anesthesia Stop        Responsible Staff  04/07/21    Name Role Begin End    Blake Goode M.D. Anesth 1034 1149        Preop Diagnosis (Free Text):  Pre-op Diagnosis     OSTEOARTHRITIS OF KNEE        Preop Diagnosis (Codes):    Post op Diagnosis  OA (osteoarthritis) of knee      Premium Reason  Non-Premium    Comments:

## 2021-04-07 NOTE — PROGRESS NOTES
Received report from Artur MORA. Pt to floor via bed at 1318. Pt is awake and alert. No signs of distress. Pt denies any nausea at this time. Pt denies any numbness or tingling. Pt reports pain 5/10, see MAR. Pt is able to planter flex and dorsi flex ble, + pulses. Dressing to r knee is cdi. Polar ice in place. scd in place. Fall precautions in place. Bed in locked and lowest position. Call light within reach

## 2021-04-07 NOTE — ANESTHESIA PROCEDURE NOTES
Peripheral Block    Date/Time: 4/7/2021 10:23 AM  Performed by: Blake Goode M.D.  Authorized by: Blake Goode M.D.     Patient Location:  Pre-op  Start Time:  4/7/2021 10:23 AM  End Time:  4/7/2021 10:27 AM  Reason for Block: at surgeon's request and post-op pain management ONLY    patient identified, IV checked, site marked, risks and benefits discussed, surgical consent, monitors and equipment checked, pre-op evaluation and timeout performed    Patient Position:  Supine  Prep: ChloraPrep    Monitoring:  Heart rate, continuous pulse ox and cardiac monitor  Block Region:  Lower Extremity  Lower Extremity - Block Type:  IPACK - Selective SCIATIC block at the posterior capsule of the knee    Laterality:  Right  Procedures: ultrasound guided  Image captured, interpreted and electronically stored.  Local Infiltration:  Lidocaine  Strength:  1 %  Dose:  3 ml  Block Type:  Single-shot  Needle Length:  100mm  Needle Gauge:  21 G  Needle Localization:  Ultrasound guidance  Injection Assessment:  Negative aspiration for heme, no paresthesia on injection, incremental injection and local visualized surrounding nerve on ultrasound  Evidence of intravascular injection: No     Selective Sciatic Posterior Capsule of the Knee Block  With the patient supine and the operative knee flexed 90 degrees, US transducer placed on medial thigh to identify the posterior femoral condyles and popliteal artery.  The transducer was moved cephalad to the point that the condyles were no longer visible.  A nerve block needle was advanced in the space between the posterior capsule of the knee and popliteal artery where branches of the sciatic nerve pass. Local was injected as the needle was withdrawn after aspiration did not reveal and blood.

## 2021-04-07 NOTE — OR NURSING
1146: To PACU post right total knee arthroplasty w/ block. Pt is extubated, breathing is spontaneous and unlabored. Strong DP pulse observed on operative extremity.  1158: Pt states pain is 10/10, see MAR.  1217: Pt states no change in pain level.  1244: Pain is now 71/10.  1300: Pt reports pain is 6/10, tolerable, no nausea. Meets criteria for transfer to room.

## 2021-04-07 NOTE — PROGRESS NOTES
Delivered and adjusted walker for pt, if pt has any questions regarding the walker or requires adjustments contact traction at [phone]

## 2021-04-07 NOTE — ANESTHESIA POSTPROCEDURE EVALUATION
Patient: Rafy Morrison    Procedure Summary     Date: 04/07/21 Room / Location:  OR 03 / SURGERY [facility]    Anesthesia Start: 1034 Anesthesia Stop: 1149    Procedure: ARTHROPLASTY, KNEE, TOTAL (Right Knee) Diagnosis: (OSTEOARTHRITIS OF KNEE)    Surgeons: Blade Valdez M.D. Responsible Provider: Blake Goode M.D.    Anesthesia Type: peripheral nerve block, general ASA Status: 2          Final Anesthesia Type: peripheral nerve block, general  Last vitals  BP   Blood Pressure: 141/75    Temp   36.3 °C (97.3 °F)    Pulse   91   Resp   20    SpO2   99 %      Anesthesia Post Evaluation    Patient location during evaluation: PACU  Patient participation: complete - patient participated  Level of consciousness: awake and alert    Airway patency: patent  Anesthetic complications: no  Cardiovascular status: hemodynamically stable  Respiratory status: acceptable  Hydration status: euvolemic    PONV: none          No complications documented.     Nurse Pain Score: 9 (NPRS)

## 2021-04-07 NOTE — ANESTHESIA PROCEDURE NOTES
Airway    Date/Time: 4/7/2021 10:41 AM  Performed by: Blake Goode M.D.  Authorized by: Blake Goode M.D.     Location:  OR  Urgency:  Elective  Indications for Airway Management:  Anesthesia      Spontaneous Ventilation: absent    Sedation Level:  Deep  Preoxygenated: Yes    Final Airway Type:  Supraglottic airway  Final Supraglottic Airway:  Standard LMA    SGA Size:  4  Number of Attempts at Approach:  1

## 2021-04-07 NOTE — CARE PLAN
Problem: Communication  Goal: The ability to communicate needs accurately and effectively will improve  Outcome: PROGRESSING AS EXPECTED   Pt is able to communicate needs appropriately to staff.  Problem: Safety  Goal: Will remain free from falls  Outcome: PROGRESSING AS EXPECTED     Problem: Venous Thromboembolism (VTW)/Deep Vein Thrombosis (DVT) Prevention:  Goal: Patient will participate in Venous Thrombosis (VTE)/Deep Vein Thrombosis (DVT)Prevention Measures  Outcome: PROGRESSING AS EXPECTED   Pt is compliant with wearing scd while in bed.  Problem: Pain Management  Goal: Pain level will decrease to patient's comfort goal  Outcome: PROGRESSING AS EXPECTED     Problem: Safety  Goal: Free from accidental injury  Outcome: PROGRESSING AS EXPECTED

## 2021-04-07 NOTE — ANESTHESIA PREPROCEDURE EVALUATION
Relevant Problems   GI   (+) Gastroesophageal reflux disease without esophagitis      ENDO   (+) Hypothyroidism, acquired       Physical Exam    Airway   Mallampati: II  TM distance: >3 FB  Neck ROM: full       Cardiovascular - normal exam  Rhythm: regular  Rate: normal  (-) murmur     Dental - normal exam           Pulmonary - normal exam  Breath sounds clear to auscultation     Abdominal    Neurological - normal exam                 Anesthesia Plan    ASA 2       Plan - peripheral nerve block and general     Peripheral nerve block will be post-op pain control  Airway plan will be LMA          Induction: intravenous    Postoperative Plan: Postoperative administration of opioids is intended.    Pertinent diagnostic labs and testing reviewed    Informed Consent:    Anesthetic plan and risks discussed with patient.    Use of blood products discussed with: patient whom consented to blood products.

## 2021-04-07 NOTE — ANESTHESIA PROCEDURE NOTES
Peripheral Block    Date/Time: 4/7/2021 10:20 AM  Performed by: Blake Goode M.D.  Authorized by: Blake Goode M.D.     Start Time:  4/7/2021 10:20 AM  End Time:  4/7/2021 10:23 AM  Reason for Block: at surgeon's request and post-op pain management ONLY    patient identified, IV checked, site marked, risks and benefits discussed, surgical consent, monitors and equipment checked, pre-op evaluation and timeout performed    Patient Position:  Supine  Prep: ChloraPrep    Monitoring:  Heart rate, continuous pulse ox and cardiac monitor  Block Region:  Lower Extremity  Lower Extremity - Block Type:  Selective FEMORAL nerve block at the Adductor Canal    Laterality:  Right  Procedures: ultrasound guided  Image captured, interpreted and electronically stored.  Local Infiltration:  Lidocaine  Strength:  1 %  Dose:  3 ml  Block Type:  Single-shot  Needle Length:  100mm  Needle Gauge:  21 G  Needle Localization:  Ultrasound guidance  Injection Assessment:  Negative aspiration for heme, no paresthesia on injection, incremental injection and local visualized surrounding nerve on ultrasound   US Guided Selective Femoral Nerve Block at Adductor Canal:   US probe placed at mid-thigh level on externally rotated leg and femur identified.  Probe directed medially until Sartorius Muscle (SM), Femoral Artery (FA) and Saphenous Nerve (SN) identified in Adductor Canal (AC).  Needle inserted anterolateral to probe in an in plane approach into a subsartorial perivascular perineural position.  After negative aspiration LA injected with ease and visualized spreading within the AC.

## 2021-04-07 NOTE — DISCHARGE PLANNING
Received Choice form at 8601  Agency/Facility Name: [facility]  Referral sent per Choice form @ 7901   Notified [facility] via [employer]

## 2021-04-07 NOTE — OP REPORT
DATE OF SERVICE:  04/07/2021     PREOPERATIVE DIAGNOSIS:  Right knee degenerative joint disease.     POSTOPERATIVE DIAGNOSIS:  Right knee degenerative joint disease.     PROCEDURE:  Right total knee arthroplasty.     SURGEON:  Blade Valdez MD     ASSISTANT:  Jennifer Irene CFA     ANESTHESIA:  General endotracheal anesthesia with adductor canal and iPACK   block.     ANESTHESIOLOGIST:  Blake Goode MD     ESTIMATED BLOOD LOSS:  Minimal.     COMPLICATIONS:  None.     INDICATIONS FOR PROCEDURE:  This is a 60-year-old male with previous history   of right tibial plateau fracture.  He has progressive osteoarthritis of his   knee.  He has failed conservative management including home exercise program,   anti-inflammatories, intra-articular injections.  He presents for operative   treatment.  For further details of H and P, please see chart.     Risks, benefits and alternatives of procedure were discussed with the patient   and include but are not limited to bleeding, infection, neurovascular injury,   need for further surgery, PE, DVT, blood transfusion with its associated   risks, anesthesia with its associated risks, and death.  All questions were   answered.  No warranties or guarantees were given or implied.  Consent is   signed and is on chart.     DESCRIPTION OF PROCEDURE:  The patient was taken to the operating room and   placed supine on the operating room table.  Prior to this, an adductor canal   and iPACK block were placed in preoperative holding for postoperative pain   control.  The patient's right lower extremity was prepped and draped in normal   sterile fashion.  Leg was exsanguinated, tourniquet inflated to 250 mmHg.  A   20 cm incision was made over the anterior aspect of the knee.  This was taken   sharply through skin and subcutaneous tissue.  Bovie cautery was used to   obtain hemostasis.  Dissection was carried down to the level of the   retinaculum.  Flaps were elevated in medial and  lateral aspect.  Medial   parapatellar arthrotomy was performed.  Medial release was performed.    Retropatellar fat pad was excised.  Patella was everted and knee flexed to 90   degrees.  Cartilage removed off the distal end of the femur.  The ConforMIS F2   guide was pinned into position.  Distal resection was performed.  F3 guide   was placed and anterior condyles, posterior condyles, anterior chamfer were   placed.  This was removed.  All bony pieces were removed.  Posterior chamfer   guide was placed and the posterior chamfers were cut.  Notch guide was placed   and the notch was cut.  This was checked with the resection guide and felt to   be adequate.  Attention was placed on the tibia.  All meniscus and soft   tissues were removed.  Cartilage was removed in the appropriate position.  The   tibial guide was placed appropriately and pinned into position.  This showed   good alignment.  Tibial resection was performed.  Bone was removed.  Posterior   release was performed.  Trials were placed and showed excellent motion with   full extension and good stability.  Trials were removed.  A 9.5 mm resection   was performed on the patella, was sized to a size 38 ____ drilled.  Trial fit   quite well.  Tibial guide was then pinned into position.  The keel was drilled   and then broached.  At this point, the knee was thoroughly lavaged.  The   posterior capsule was injected with combination of Marcaine, Toradol and   Duramorph.  The ConforMIS patient-specific knee was cemented into position   with a 6 mm poly and a 38 mm patella.  All excess cement was removed.  This   showed good stability with good range of motion.  The knee was thoroughly   lavaged with Betadine.  Then, re-lavaged with saline.  Tourniquet was dropped.    Adequate hemostasis was achieved.  Parapatellar arthrotomy was closed using   #2 Quill.  Subcutaneous tissue closed using 2-0 Vicryl.  Skin closed using   staples.  The patient was placed in a soft  sterile dressing.  He was awakened   from anesthesia and returned to recovery cart in the recovery room in stable   condition.  There were no christina or intraoperative complications noted.        ______________________________  MD GOPI Younger/CLEMENTINE/ANJELICA    DD:  04/07/2021 11:48  DT:  04/07/2021 13:32    Job#:  [id number]

## 2021-04-07 NOTE — DISCHARGE PLANNING
Anticipated Discharge Disposition: Home with DME (walker)    Action: Mary BRIAN's note stating that during preadmission appointment she was able to identify that pt had all needed equipment, except a walker.     LSW met with pt at bedside to discuss DME CHOICE form. Pt had listed [facility] as #2 DME CHOICE. Pt would like for walker to be delivered to bedside. Pt provided signature for referral to be sent to [facility].     No DME order for walker in place a this time and also not available in pre-op order. Bedside RNAntionette.     Barriers to Discharge: None     Plan: LSW to await DME order, LSW to assist as needed     Addendum 1455  LSW informed by Charge Elaine MORA that DME order in place. LSW faxed DME CHOICE to S[facility].

## 2021-04-07 NOTE — OR SURGEON
Immediate Post OP Note    PreOp Diagnosis: rt knee DJD    PostOp Diagnosis: same    Procedure(s):  ARTHROPLASTY, KNEE, TOTAL - Wound Class: Clean    Surgeon(s):  Blade Valdez M.D.    Anesthesiologist/Type of Anesthesia:  Anesthesiologist: Blake Goode M.D./General    Surgical Staff:  Circulator: Elise Edwards R.N.  Relief Circulator: Natali Jonas R.N.  Relief Scrub: Arin Babb  Scrub Person: Thea Mustafa    Specimens removed if any:  * No specimens in log *    Estimated Blood Loss: minmal    Findings: DJD knee    Complications:  none        4/7/2021 11:44 AM Blade Valdez M.D.

## 2021-04-08 VITALS
TEMPERATURE: 97.9 F | OXYGEN SATURATION: 94 % | DIASTOLIC BLOOD PRESSURE: 60 MMHG | HEIGHT: 71 IN | SYSTOLIC BLOOD PRESSURE: 110 MMHG | RESPIRATION RATE: 18 BRPM | WEIGHT: 209.44 LBS | BODY MASS INDEX: 29.32 KG/M2 | HEART RATE: 81 BPM

## 2021-04-08 PROCEDURE — G0378 HOSPITAL OBSERVATION PER HR: HCPCS

## 2021-04-08 PROCEDURE — 97165 OT EVAL LOW COMPLEX 30 MIN: CPT

## 2021-04-08 PROCEDURE — 97161 PT EVAL LOW COMPLEX 20 MIN: CPT

## 2021-04-08 PROCEDURE — 700102 HCHG RX REV CODE 250 W/ 637 OVERRIDE(OP): Performed by: ORTHOPAEDIC SURGERY

## 2021-04-08 PROCEDURE — A9270 NON-COVERED ITEM OR SERVICE: HCPCS | Performed by: ORTHOPAEDIC SURGERY

## 2021-04-08 RX ADMIN — OXYCODONE HYDROCHLORIDE 10 MG: 10 TABLET ORAL at 09:10

## 2021-04-08 RX ADMIN — OMEPRAZOLE 20 MG: 20 CAPSULE, DELAYED RELEASE ORAL at 07:29

## 2021-04-08 RX ADMIN — LEVOTHYROXINE SODIUM 100 MCG: 0.1 TABLET ORAL at 05:41

## 2021-04-08 RX ADMIN — TRAMADOL HYDROCHLORIDE 50 MG: 50 TABLET, FILM COATED ORAL at 11:28

## 2021-04-08 RX ADMIN — TRAMADOL HYDROCHLORIDE 50 MG: 50 TABLET, FILM COATED ORAL at 05:41

## 2021-04-08 RX ADMIN — DOCUSATE SODIUM 100 MG: 100 CAPSULE, LIQUID FILLED ORAL at 05:41

## 2021-04-08 RX ADMIN — CELECOXIB 200 MG: 200 CAPSULE ORAL at 05:41

## 2021-04-08 RX ADMIN — ASPIRIN 325 MG: 325 TABLET, COATED ORAL at 09:10

## 2021-04-08 RX ADMIN — GABAPENTIN 100 MG: 100 CAPSULE ORAL at 05:41

## 2021-04-08 ASSESSMENT — ACTIVITIES OF DAILY LIVING (ADL): TOILETING: INDEPENDENT

## 2021-04-08 ASSESSMENT — PAIN DESCRIPTION - PAIN TYPE
TYPE: ACUTE PAIN
TYPE: ACUTE PAIN
TYPE: ACUTE PAIN;SURGICAL PAIN
TYPE: ACUTE PAIN;SURGICAL PAIN

## 2021-04-08 ASSESSMENT — GAIT ASSESSMENTS
DEVIATION: ANTALGIC;STEP TO
DISTANCE (FEET): 40
DISTANCE (FEET): 150
GAIT LEVEL OF ASSIST: SUPERVISED
ASSISTIVE DEVICE: FRONT WHEEL WALKER

## 2021-04-08 ASSESSMENT — COGNITIVE AND FUNCTIONAL STATUS - GENERAL
WALKING IN HOSPITAL ROOM: A LITTLE
MOVING TO AND FROM BED TO CHAIR: A LITTLE
MOBILITY SCORE: 23
CLIMB 3 TO 5 STEPS WITH RAILING: A LITTLE
MOVING FROM LYING ON BACK TO SITTING ON SIDE OF FLAT BED: A LITTLE
SUGGESTED CMS G CODE MODIFIER DAILY ACTIVITY: CJ
SUGGESTED CMS G CODE MODIFIER MOBILITY: CI
DRESSING REGULAR LOWER BODY CLOTHING: A LITTLE
STANDING UP FROM CHAIR USING ARMS: A LITTLE
PERSONAL GROOMING: A LITTLE
TOILETING: A LITTLE
TURNING FROM BACK TO SIDE WHILE IN FLAT BAD: A LITTLE
SUGGESTED CMS G CODE MODIFIER MOBILITY: CK
DAILY ACTIVITIY SCORE: 20
DAILY ACTIVITIY SCORE: 21
DRESSING REGULAR LOWER BODY CLOTHING: A LITTLE
SUGGESTED CMS G CODE MODIFIER DAILY ACTIVITY: CJ
TOILETING: A LITTLE
CLIMB 3 TO 5 STEPS WITH RAILING: A LOT
HELP NEEDED FOR BATHING: A LITTLE
HELP NEEDED FOR BATHING: A LITTLE
MOBILITY SCORE: 17

## 2021-04-08 NOTE — PROGRESS NOTES
Received report from night shift RN. Pt is AOx4. No signs of distress. Pt denies any nausea at this time. Pt reports pain 2/10, declines pain medication at this time. Pt able to planter flex and dorsi flex ble, tingling present to r foot at this time, + pulses. Dressing to r knee is cdi. Polar ice in place to r knee. Fall precautions in place. Bed in locked and lowest position. Call light within reach.

## 2021-04-08 NOTE — DISCHARGE INSTRUCTIONS
Discharge Instructions    Discharged to home by car with relative. Discharged via wheelchair, hospital escort: Yes.  Special equipment needed: Walker    Be sure to schedule a follow-up appointment with your primary care doctor or any specialists as instructed.     Discharge Plan:   Diet Plan: Discussed  Activity Level: Discussed  Confirmed Follow up Appointment: Appointment Scheduled  Confirmed Symptoms Management: Discussed  Medication Reconciliation Updated: Yes    I understand that a diet low in cholesterol, fat, and sodium is recommended for good health. Unless I have been given specific instructions below for another diet, I accept this instruction as my diet prescription.   Other diet: Home Diet    Special Instructions: Discharge instructions for the Orthopedic Patient    Follow up with Primary Care Physician within 2 weeks of discharge to home, regarding:  Review of medications and diagnostic testing.  Surveillance for medical complications.  Workup and treatment of osteoporosis, if appropriate.     -Is this a Hip/Knee/Shoulder Joint Replacement patient? Yes   TOTAL KNEE REPLACEMENT, AFTER-CARE GUIDELINES     These instructions provide you with information on caring for yourself and your knee after surgery. Your health care provider may also give you instructions that are more specific. Your treatment was planned and performed according to current medical practices but problems sometimes occur. Call your health care provider if you have any problems or questions.     WHAT TO EXPECT AFTER THE PROCEDURE   After your procedure, your knee will typically be stiff, sore, and bruised. This will improve over time.     Pain   · Follow your home pain management plan as discussed with your nurse and as directed by your provider.   · It is important to follow any scheduled pain medications for maximal pain relief.   · If prescribed opioid medication, the goal is to use opioids only as needed and to wean off prescription  pain medicine as soon as possible.   · Ice can be used for pain control.   · Put ice in a plastic bag.   · Place a towel between your skin and the bag.   · Leave the ice on for 20 minutes, 2-3 times a day at a minimum.   · Most patients are off the pain pills by 3 weeks. If your pain continues to be severe, follow up with your provider.     Infection   Knee joint infections occur in fewer than 2% of patients. The most common causes of infection following total knee replacement surgery are from bacteria that enter the bloodstream during dental procedures, urinary tract infections, or skin infections. These bacteria can lodge around your knee replacement and cause an infection.   · Keep the incision as clean and dry as possible.   · Always wash your hands before touching your incision.   · Avoid dental care for 3 months after surgery. Your provider may recommend taking a dose of antibiotics an hour prior to any dental procedure. After 2 years, most providers recommend antibiotics only before an extensive procedure. Ask your provider what they recommend.   · Signs and symptoms of infection include low-grade fever, redness, pain, swelling and drainage from your incision. Notify your provider IMMEDIATELY if you develop ANY of these symptoms.     Post op Disturbances   · Bowel Habits - Constipation is extremely common and caused by a combination of anesthesia, lack of mobility, dehydration and pain medicine. Use stool softeners or laxatives if necessary. It is important not to ignore this problem as bowel obstructions can be a serious complication after joint replacement surgery.   · Mood/Energy Level - Many patients experience a lack of energy and endurance for up to 2-3 months after surgery. Some people feel down and can even become depressed. This is likely due to postoperative anemia, change in activity level, lack of sleep, pain medicine and just the emotional reaction to the surgery itself that is a big disruption  in a person’s life. This usually passes. If symptoms persist, follow up with your primary care provider.  · Returning to Work - Your provider will give you specific instructions based on your profession. Generally, if you work a sedentary job requiring little standing or walking, most patients may return within 2-6 weeks. Manual labor jobs involving walking, lifting and standing may take 3-4 months. Your provider’s office can provide a release to part-time or light duty work early on in your recovery and progress you to full duty as able.   · Driving - You can begin driving once cleared by your provider, provided you are no longer taking narcotic pain medication or any other medications that impair driving. Discuss the length of time expected with your provider as returning to driving depends on things such as your vehicle, which knee was replaced (right or left), and knee motion, strength and reflexes returning appropriately.   · Avoiding falls - A fall during the first few weeks after surgery can damage your new knee and may result in a need for further surgery.  throw rugs and tack down loose carpeting. Be aware of floor hazards such as pets, small objects or uneven surfaces. Notify your provider of any falls.   · [employer] Metal Detectors - The sensitivity of metal detectors varies and it is likely that your prosthesis will cause an alarm. Inform the  of your artificial joint.     Diet   · Resume your normal diet as tolerated.   · It is important to achieve a healthy nutritional status by eating a well-balanced diet on a regular basis.   · Your provider may recommend that you take iron and vitamin supplements.   · Continue to drink plenty of fluids.     Shower/Bathing   · You may shower as soon as you get home from the hospital unless otherwise instructed.   · Keep your incision out of water to prevent infection. To keep the incision dry when showering, cover it with a plastic bag or plastic  wrap. If your bandage is waterproof, this may not be necessary. o Pat incision dry if it gets wet. Do not rub. Notify your provider.   · Do not submerge in a bath until cleared by your provider. Your staples must be out and the incision completely healed.     Dressing Change: Only change your dressing if directed by your provider.   · Wash hands.   · Open all dressing change materials.   · Remove old dressing and discard.   · Inspect incision for signs of irritation or infection including redness, increase in clear drainage, yellow/green drainage, odor and surrounding skin hot to touch. Notify your provider if present.   ·  the new dressing by one corner and lay over the incision. Be careful not to touch the inside of the dressing that will lay over the incision.   · Secure in place as instructed. Swelling/Bruising   · Swelling is normal after knee replacement and can involve the thigh, knee, calf and foot.   · Swelling can last from 3-6 months.   · To reduce swelling, elevate your leg higher than your heart while reclining. The first week you are home you should elevate your leg an equal amount of time as you are active.   · The swelling is usually worse after you go home since you are upright for longer periods of time.   · Bruising often does not appear until after you arrive home and can be quite dramatic- appearing purple, black, or green. Bruising is typically not concerning and will subside without any treatment.     Blood Clot Prevention   Your treatment plan includes multiple preventative measures to decrease the risk of blood clots in the legs (DVTs) and the less common, but serious, clots that travel to the lungs (pulmonary emboli). Most patients are at standard risk for them, but people who are at higher risk include those who have had previous clots, a family history of clotting, smoking, diabetes, obesity, advanced age, use estrogen and/or live a sedentary lifestyle.     · Signs of blood clots  in legs include - Swelling in thigh, calf or ankle that does not go down with elevation. Pain, heat and tenderness in calf, back of calf or groin area. NOTE: blood clots can occur in either leg.   · Signs of blood clots in lungs include - Sudden increased shortness of breath, sudden onset of chest pain, and localized chest pain with coughing.   · If you experience any of the above symptoms, notify your provider and seek medical attention immediately.   · You received anticoagulant therapy (blood thinners) in the hospital. Continue the prescribed blood-thinning medication at home, as directed by your provider.   · Your risk for developing a clot continues for up to 2-3 months after surgery. Avoid prolonged sitting and dehydration (long air trips and car trips). If you do take a trip during this time, please get up, move around every 1-1.5 hours, and discuss all travel plans with your provider.     Activity   Once home, stay active. The key is not to overdo it. While you can expect some good days and some bad days, you should notice a gradual improvement and a gradual increase in your endurance over the next 6 to 12 months. Exercise is a critical component of recovery, particularly during the first few weeks after surgery.     · Normal activities of daily living - Expect to resume most within 3 to 6 weeks following surgery. Some pain with activity and at night is common for several weeks after surgery. Walk as much as you like once your doctor gives permission to proceed, but remember that walking is no substitute for the exercises your doctor and physical therapist prescribe. Use a walker, crutches or cane to assist with walking until you can walk smoothly (minimal or no limp) without assistance.   · Physical Therapy Exercises - Follow your home exercise program as instructed by your physical therapist during your hospital stay. Call and set up outpatient physical therapy appointments per your provider’s  recommendations. Physical therapy after the hospital stay focuses on increasing your range of motion, strengthening your muscles and improving your gait/walking pattern. Contact your provider for the referral to outpatient physical therapy if you have not yet received this. -   · Riding a stationary bicycle can help maintain muscle tone and keep your knee flexible. Begin stationary bicycling as directed by your physical therapist or provider.   · Sexual Activity - Your provider can tell you when it safe to resume sexual activity.   · Sleeping Positions - You can safely sleep on your back, on either side, or on your stomach.   · Other Activities - Lower impact activities are preferred. Consult your provider if you have specific questions.     When to Call the Doctor   Call the provider if you experience:   · Fever over 100.5° F   · Increased pain, drainage, redness, odor or heat around the incision area   · Shaking chills   · Increased knee pain with activity and rest   · Increased pain in calf, tenderness or redness above or below the knee   · Increased swelling of calf, ankle, foot   · Sudden increased shortness of breath, sudden onset of chest pain, localized chest pain with coughing   · Incision opening   Or, if there are any questions or concerns about medications or care.     Infection statistic resource:   https://www.[employer]/contents/prosthetic-joint-infection-epidemiology-microbiology-clinical-manifestations-and-diagnosis     -Is this patient being discharged with medication to prevent blood clots?  Yes, Aspirin Aspirin, ASA oral tablets  What is this medicine?  ASPIRIN (AS pir in) is a pain reliever. It is used to treat mild pain and fever. This medicine is also used as directed by a doctor to prevent and to treat heart attacks, to prevent strokes and blood clots, and to treat arthritis or inflammation.  This medicine may be used for other purposes; ask your health care provider or pharmacist if you  have questions.  COMMON BRAND NAME(S): Aspir-Low, Aspir-Rosario, Aspirtab, Joby Advanced Aspirin, Joby Aspirin, Joby Aspirin Extra Strength, Joby Aspirin Plus, Joby Extra Strength, Joby Extra Strength Plus, Joby Genuine Aspirin, Joby Womens Aspirin, Bufferin, Bufferin Extra Strength, Bufferin Low Dose  What should I tell my health care provider before I take this medicine?  They need to know if you have any of these conditions:  · anemia  · asthma  · bleeding problems  · child with chickenpox, the flu, or other viral infection  · diabetes  · gout  · if you frequently drink alcohol containing drinks  · kidney disease  · liver disease  · low level of vitamin K  · lupus  · smoke tobacco  · stomach ulcers or other problems  · an unusual or allergic reaction to aspirin, tartrazine dye, other medicines, dyes, or preservatives  · pregnant or trying to get pregnant  · breast-feeding  How should I use this medicine?  Take this medicine by mouth with a glass of water. Follow the directions on the package or prescription label. You can take this medicine with or without food. If it upsets your stomach, take it with food. Do not take your medicine more often than directed.  Talk to your pediatrician regarding the use of this medicine in children. While this drug may be prescribed for children as young as 12 years of age for selected conditions, precautions do apply. Children and teenagers should not use this medicine to treat chicken pox or flu symptoms unless directed by a doctor.  Patients over 65 years old may have a stronger reaction and need a smaller dose.  Overdosage: If you think you have taken too much of this medicine contact a poison control center or emergency room at once.  NOTE: This medicine is only for you. Do not share this medicine with others.  What if I miss a dose?  If you are taking this medicine on a regular schedule and miss a dose, take it as soon as you can. If it is almost time for your next  dose, take only that dose. Do not take double or extra doses.  What may interact with this medicine?  Do not take this medicine with any of the following medications:  · cidofovir  · ketorolac  · probenecid  This medicine may also interact with the following medications:  · alcohol  · alendronate  · bismuth subsalicylate  · flavocoxid  · herbal supplements like feverfew, garlic, linda, ginkgo biloba, horse chestnut  · medicines for diabetes or glaucoma like acetazolamide, methazolamide  · medicines for gout  · medicines that treat or prevent blood clots like enoxaparin, heparin, ticlopidine, warfarin  · other aspirin and aspirin-like medicines  · NSAIDs, medicines for pain and inflammation, like ibuprofen or naproxen  · pemetrexed  · sulfinpyrazone  · varicella live vaccine  This list may not describe all possible interactions. Give your health care provider a list of all the medicines, herbs, non-prescription drugs, or dietary supplements you use. Also tell them if you smoke, drink alcohol, or use illegal drugs. Some items may interact with your medicine.  What should I watch for while using this medicine?  If you are treating yourself for pain, tell your doctor or health care professional if the pain lasts more than 10 days, if it gets worse, or if there is a new or different kind of pain. Tell your doctor if you see redness or swelling. Also, check with your doctor if you have a fever that lasts for more than 3 days. Only take this medicine to prevent heart attacks or blood clotting if prescribed by your doctor or health care professional.  Do not take aspirin or aspirin-like medicines with this medicine. Too much aspirin can be dangerous. Always read the labels carefully.  This medicine can irritate your stomach or cause bleeding problems. Do not smoke cigarettes or drink alcohol while taking this medicine. Do not lie down for 30 minutes after taking this medicine to prevent irritation to your throat.  If you  are scheduled for any medical or dental procedure, tell your healthcare provider that you are taking this medicine. You may need to stop taking this medicine before the procedure.  This medicine may be used to treat migraines. If you take migraine medicines for 10 or more days a month, your migraines may get worse. Keep a diary of headache days and medicine use. Contact your healthcare professional if your migraine attacks occur more frequently.  What side effects may I notice from receiving this medicine?  Side effects that you should report to your doctor or health care professional as soon as possible:  · allergic reactions like skin rash, itching or hives, swelling of the face, lips, or tongue  · breathing problems  · changes in hearing, ringing in the ears  · confusion  · general ill feeling or flu-like symptoms  · pain on swallowing  · redness, blistering, peeling or loosening of the skin, including inside the mouth or nose  · signs and symptoms of bleeding such as bloody or black, tarry stools; red or dark-brown urine; spitting up blood or brown material that looks like coffee grounds; red spots on the skin; unusual bruising or bleeding from the eye, gums, or nose  · trouble passing urine or change in the amount of urine  · unusually weak or tired  · yellowing of the eyes or skin  Side effects that usually do not require medical attention (report to your doctor or health care professional if they continue or are bothersome):  · diarrhea or constipation  · headache  · nausea, vomiting  · stomach gas, heartburn  This list may not describe all possible side effects. Call your doctor for medical advice about side effects. You may report side effects to FDA at 2-774-FDA-5713.  Where should I keep my medicine?  Keep out of the reach of children.  Store at room temperature between 15 and 30 degrees C (59 and 86 degrees F). Protect from heat and moisture. Do not use this medicine if it has a strong vinegar smell.  Throw away any unused medicine after the expiration date.  NOTE: This sheet is a summary. It may not cover all possible information. If you have questions about this medicine, talk to your doctor, pharmacist, or health care provider.  © 2020 [employer] (2018-01-30 10:42:13)      · Is patient discharged on Warfarin / Coumadin?   No     Depression / Suicide Risk    As you are discharged from this [facility] facility, it is important to learn how to keep safe from harming yourself.    Recognize the warning signs:  · Abrupt changes in personality, positive or negative- including increase in energy   · Giving away possessions  · Change in eating patterns- significant weight changes-  positive or negative  · Change in sleeping patterns- unable to sleep or sleeping all the time   · Unwillingness or inability to communicate  · Depression  · Unusual sadness, discouragement and loneliness  · Talk of wanting to die  · Neglect of personal appearance   · Rebelliousness- reckless behavior  · Withdrawal from people/activities they love  · Confusion- inability to concentrate     If you or a loved one observes any of these behaviors or has concerns about self-harm, here's what you can do:  · Talk about it- your feelings and reasons for harming yourself  · Remove any means that you might use to hurt yourself (examples: pills, rope, extension cords, firearm)  · Get professional help from the community (Mental Health, Substance Abuse, psychological counseling)  · Do not be alone:Call your Safe Contact- someone whom you trust who will be there for you.  · Call your local CRISIS HOTLINE 577-1664 or 520-937-3530  · Call your local Children's Mobile Crisis Response Team [place] (772) 852-5121 or www.[employer]  · Call the toll free National Suicide Prevention Hotlines   · National Suicide Prevention Lifeline 058-918-PSDA (3349)  · National Hope Line Network 800-SUICIDE (061-3099)

## 2021-04-08 NOTE — THERAPY
Physical Therapy   Initial Evaluation     Patient Name: Rafy Morrison  Age:  60 y.o., Sex:  male  Medical Record #: [id number]  Today's Date: 4/8/2021     Precautions: No Weight Bearing Restrictions Right Lower Extremity    Assessment  Patient is 60 y.o. male who was seen s/p R TKA. Pt was agreeable to therapy evaluation and was able to demo safe functional mobility. Pt educated on supine therapeutic exercises, elevation, icing, and positioning. Pt understood all precautions and was able to demonstrate good mechanics with mobility. Pt provided with cues and demo to correct gait mechanics, however, was limited due to pain. Pt is in no acute skilled PT needs at this time, anticipate pt to d/c home once medically clear.     Plan    Recommend Physical Therapy for Evaluation only    DC Equipment Recommendations: (P) Front-Wheel Walker  Discharge Recommendations: (P) Recommend outpatient physical therapy services to address higher level deficits     Objective       04/08/21 0840   Initial Contact Note    Initial Contact Note Order Received and Verified, Evaluation Only - Patient Does Not Require Further Acute Physical Therapy at this Time.  However, May Benefit from Post Acute Therapy for Higher Level Functional Deficits.   Precautions   Precautions No Weight Bearing Restrictions Right Lower Extremity   Pain 0 - 10 Group   Location Knee   Location Orientation Right   Description Dull;Aching   Therapist Pain Assessment Prior to Activity;During Activity;Post Activity;Nurse Notified;5   Prior Living Situation   Prior Services None   Housing / Facility 1 Story House   Steps Into Home 0   Steps In Home 0   Bathroom Set up Walk In Shower;Bathtub / Shower Combination;Tub Transfer Bench   Equipment Owned Sock Aid;Reacher   Lives with - Patient's Self Care Capacity Spouse   Comments pt states spouse will be home to assist upon d/c to home    Prior Level of Functional Mobility   Bed Mobility Independent   Transfer Status  Independent   Ambulation Independent   Distance Ambulation (Feet)   (community )   Assistive Devices Used None   Stairs Independent   History of Falls   History of Falls No   Cognition    Cognition / Consciousness WDL   Comments pleasant/cooperative    Passive ROM Lower Body   Passive ROM Lower Body X   Comments limited due to pain; able to demo 0 to 70 deg of R knee ROM   Active ROM Lower Body    Active ROM Lower Body  X   Comments limited due to pain; 0 to 60 deg of AROM of L knee    Strength Lower Body   Lower Body Strength  X   Comments limited due to pain; able to demo functional strength for household ambulation    Sensation Lower Body   Lower Extremity Sensation   WDL   Lower Body Muscle Tone   Lower Body Muscle Tone  WDL   Strength Upper Body   Upper Body Strength  WDL   Upper Body Muscle Tone   Upper Body Muscle Tone  WDL   Neurological Concerns   Neurological Concerns No   Coordination Lower Body    Coordination Lower Body  WDL   Balance Assessment   Sitting Balance (Static) Good   Sitting Balance (Dynamic) Fair +   Standing Balance (Static) Good   Standing Balance (Dynamic) Fair +   Weight Shift Sitting Good   Weight Shift Standing Fair   Comments w/fww; no emeka LOB noted   Gait Analysis   Gait Level Of Assist Supervised   Assistive Device Front Wheel Walker   Distance (Feet) 150   # of Times Distance was Traveled 1   Deviation Antalgic;Step To   Weight Bearing Status none noted    Comments cues and demo for heel to toe mechanics; able to improve, however, continues to be limited due to pain    Bed Mobility    Supine to Sit Supervised   Sit to Supine Supervised   Scooting Supervised   Rolling Supervised   Functional Mobility   Sit to Stand Supervised   Bed, Chair, Wheelchair Transfer Supervised   Transfer Method Stand Step   Mobility EOB, sit<>stand, ambulation, back to bed   Comments cues for handplacement on bed and FWW upon standing and sitting   How much difficulty does the patient currently have...    Turning over in bed (including adjusting bedclothes, sheets and blankets)? 4   Sitting down on and standing up from a chair with arms (e.g., wheelchair, bedside commode, etc.) 4   Moving from lying on back to sitting on the side of the bed? 4   How much help from another person does the patient currently need...   Moving to and from a bed to a chair (including a wheelchair)? 4   Need to walk in a hospital room? 4   Climbing 3-5 steps with a railing? 3   6 clicks Mobility Score 23   Activity Tolerance   Sitting in Chair NT   Sitting Edge of Bed 5 mins   Standing 10 mins   Comments no adverse events noted    Education Group   Education Provided Role of Physical Therapist;Gait Training;Use of Assistive Device   Role of Physical Therapist Patient Response Patient;Acceptance;Explanation;Demonstration;Verbal Demonstration;Action Demonstration   Gait Training Patient Response Patient;Acceptance;Explanation;Demonstration;Verbal Demonstration;Action Demonstration;Reinforcement Needed   Use of Assistive Device Patient Response Patient;Acceptance;Explanation;Demonstration;Verbal Demonstration;Action Demonstration   Anticipated Discharge Equipment and Recommendations   DC Equipment Recommendations Front-Wheel Walker   Discharge Recommendations Recommend outpatient physical therapy services to address higher level deficits   Interdisciplinary Plan of Care Collaboration   IDT Collaboration with  Nursing   Patient Position at End of Therapy In Bed;Call Light within Reach;Tray Table within Reach;Phone within Reach   Collaboration Comments aware of visit and recs    Session Information   Date / Session Number  4/8 eval only    Priority 0

## 2021-04-08 NOTE — PROGRESS NOTES
1145- Discharge instructions for home discussed with patient and family with each stating that discharge instructions for home understood.

## 2021-04-08 NOTE — PROGRESS NOTES
2 RN skin assessment completed. Skin not wdl. Findings as follows:  - pt has sx dressing to r knee  All other skin wdl. Pt has pillows to support positioning.
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
## 2021-04-08 NOTE — PROGRESS NOTES
Received report from day shift RN. Assumed care of patient. Patient is currently AOx4, reporting nausea at this time, medicated per MAR. Tingling present in RLE at this time, pt able to dorsi/plantar flex. Dressing to knee is CDI. WBAT with FWW. VSS. Plan of care reviewed with the patient and plan to walk 50ft before midnight. Patient verbalized understanding. Hourly rounding in place. Bed locked and in lowest position, call light and belongings within reach.  2021 Pt was able to ambulate and walk 100 ft, steady, no N/V

## 2021-04-21 ENCOUNTER — HOSPITAL ENCOUNTER (OUTPATIENT)
Dept: LAB | Facility: MEDICAL CENTER | Age: 61
End: 2021-04-21
Attending: FAMILY MEDICINE
Payer: COMMERCIAL

## 2021-04-21 LAB
BASOPHILS # BLD AUTO: 0.4 % (ref 0–1.8)
BASOPHILS # BLD: 0.03 K/UL (ref 0–0.12)
EOSINOPHIL # BLD AUTO: 0.06 K/UL (ref 0–0.51)
EOSINOPHIL NFR BLD: 0.9 % (ref 0–6.9)
ERYTHROCYTE [DISTWIDTH] IN BLOOD BY AUTOMATED COUNT: 42.4 FL (ref 35.9–50)
HCT VFR BLD AUTO: 44.2 % (ref 42–52)
HGB BLD-MCNC: 14.5 G/DL (ref 14–18)
IMM GRANULOCYTES # BLD AUTO: 0.03 K/UL (ref 0–0.11)
IMM GRANULOCYTES NFR BLD AUTO: 0.4 % (ref 0–0.9)
LYMPHOCYTES # BLD AUTO: 1.43 K/UL (ref 1–4.8)
LYMPHOCYTES NFR BLD: 21.3 % (ref 22–41)
MCH RBC QN AUTO: 27.3 PG (ref 27–33)
MCHC RBC AUTO-ENTMCNC: 32.8 G/DL (ref 33.7–35.3)
MCV RBC AUTO: 83.2 FL (ref 81.4–97.8)
MONOCYTES # BLD AUTO: 0.31 K/UL (ref 0–0.85)
MONOCYTES NFR BLD AUTO: 4.6 % (ref 0–13.4)
NEUTROPHILS # BLD AUTO: 4.86 K/UL (ref 1.82–7.42)
NEUTROPHILS NFR BLD: 72.4 % (ref 44–72)
NRBC # BLD AUTO: 0 K/UL
NRBC BLD-RTO: 0 /100 WBC
PLATELET # BLD AUTO: 224 K/UL (ref 164–446)
PMV BLD AUTO: 9.8 FL (ref 9–12.9)
RBC # BLD AUTO: 5.31 M/UL (ref 4.7–6.1)
WBC # BLD AUTO: 6.7 K/UL (ref 4.8–10.8)

## 2021-04-21 PROCEDURE — 85025 COMPLETE CBC W/AUTO DIFF WBC: CPT

## 2021-04-21 PROCEDURE — 80061 LIPID PANEL: CPT

## 2021-04-21 PROCEDURE — 83880 ASSAY OF NATRIURETIC PEPTIDE: CPT

## 2021-04-21 PROCEDURE — 84439 ASSAY OF FREE THYROXINE: CPT

## 2021-04-21 PROCEDURE — 84402 ASSAY OF FREE TESTOSTERONE: CPT

## 2021-04-21 PROCEDURE — 80053 COMPREHEN METABOLIC PANEL: CPT

## 2021-04-21 PROCEDURE — 82746 ASSAY OF FOLIC ACID SERUM: CPT

## 2021-04-21 PROCEDURE — 84481 FREE ASSAY (FT-3): CPT

## 2021-04-21 PROCEDURE — 84270 ASSAY OF SEX HORMONE GLOBUL: CPT

## 2021-04-21 PROCEDURE — 82306 VITAMIN D 25 HYDROXY: CPT

## 2021-04-21 PROCEDURE — 82607 VITAMIN B-12: CPT

## 2021-04-21 PROCEDURE — 86800 THYROGLOBULIN ANTIBODY: CPT

## 2021-04-21 PROCEDURE — 36415 COLL VENOUS BLD VENIPUNCTURE: CPT

## 2021-04-21 PROCEDURE — 84403 ASSAY OF TOTAL TESTOSTERONE: CPT

## 2021-04-21 PROCEDURE — 84443 ASSAY THYROID STIM HORMONE: CPT

## 2021-04-22 LAB
25(OH)D3 SERPL-MCNC: 40 NG/ML (ref 30–80)
ALBUMIN SERPL BCP-MCNC: 5 G/DL (ref 3.2–4.9)
ALBUMIN/GLOB SERPL: 2 G/DL
ALP SERPL-CCNC: 79 U/L (ref 30–99)
ALT SERPL-CCNC: 21 U/L (ref 2–50)
ANION GAP SERPL CALC-SCNC: 9 MMOL/L (ref 7–16)
AST SERPL-CCNC: 22 U/L (ref 12–45)
BILIRUB SERPL-MCNC: 0.7 MG/DL (ref 0.1–1.5)
BUN SERPL-MCNC: 19 MG/DL (ref 8–22)
CALCIUM SERPL-MCNC: 9.4 MG/DL (ref 8.5–10.5)
CHLORIDE SERPL-SCNC: 106 MMOL/L (ref 96–112)
CHOLEST SERPL-MCNC: 189 MG/DL (ref 100–199)
CO2 SERPL-SCNC: 25 MMOL/L (ref 20–33)
CREAT SERPL-MCNC: 0.86 MG/DL (ref 0.5–1.4)
FOLATE SERPL-MCNC: 10.7 NG/ML
GLOBULIN SER CALC-MCNC: 2.5 G/DL (ref 1.9–3.5)
GLUCOSE SERPL-MCNC: 100 MG/DL (ref 65–99)
HDLC SERPL-MCNC: 28 MG/DL
LDLC SERPL CALC-MCNC: ABNORMAL MG/DL
NT-PROBNP SERPL IA-MCNC: <5 PG/ML (ref 0–125)
POTASSIUM SERPL-SCNC: 4.2 MMOL/L (ref 3.6–5.5)
PROT SERPL-MCNC: 7.5 G/DL (ref 6–8.2)
SODIUM SERPL-SCNC: 140 MMOL/L (ref 135–145)
T3FREE SERPL-MCNC: 3.34 PG/ML (ref 2–4.4)
T4 FREE SERPL-MCNC: 1.3 NG/DL (ref 0.93–1.7)
THYROGLOB AB SERPL-ACNC: <0.9 IU/ML (ref 0–4)
TRIGL SERPL-MCNC: 451 MG/DL (ref 0–149)
TSH SERPL DL<=0.005 MIU/L-ACNC: 1.27 UIU/ML (ref 0.38–5.33)
VIT B12 SERPL-MCNC: 794 PG/ML (ref 211–911)

## 2021-04-23 LAB
SHBG SERPL-SCNC: 35 NMOL/L (ref 11–80)
TESTOST FREE MFR SERPL: 1.7 % (ref 1.6–2.9)
TESTOST FREE SERPL-MCNC: 53 PG/ML (ref 47–244)
TESTOST SERPL-MCNC: 307 NG/DL (ref 300–890)

## 2021-05-14 NOTE — TELEPHONE ENCOUNTER
Call patient - labs ordered for thyroid function - visit lab before next request   Requested Prescriptions     Signed Prescriptions Disp Refills   • levothyroxine (SYNTHROID) 100 MCG Tab 90 Tab 0     Sig: TAKE 1 TABLET BY MOUTH EVERY MORNING ON AN EMPTY STOMACH     Authorizing Provider: PAU SANCHEZ     Ordering User: KEISHA BARRERA   • trazodone (DESYREL) 150 MG Tab 90 Tab 0     Sig: TAKE 1 TABLET BY MOUTH EVERY NIGHT AT BEDTIME     Authorizing Provider: PAU SANCHEZ     Ordering User: KEISHA BARRERA     RX sent to pharmacy.  GAUTAM Sahu     
Pt notified.  
Was the patient seen in the last year in this department? Yes     Does patient have an active prescription for medications requested? No     Received Request Via: Pharmacy  
palpitations

## 2021-08-02 ENCOUNTER — HOSPITAL ENCOUNTER (OUTPATIENT)
Dept: LAB | Facility: MEDICAL CENTER | Age: 61
End: 2021-08-02
Attending: INTERNAL MEDICINE
Payer: COMMERCIAL

## 2021-08-02 LAB
ALBUMIN SERPL BCP-MCNC: 4 G/DL (ref 3.2–4.9)
ALP SERPL-CCNC: 49 U/L (ref 30–99)
ALT SERPL-CCNC: 16 U/L (ref 2–50)
AST SERPL-CCNC: 25 U/L (ref 12–45)
BILIRUB CONJ SERPL-MCNC: <0.2 MG/DL (ref 0.1–0.5)
BILIRUB INDIRECT SERPL-MCNC: NORMAL MG/DL (ref 0–1)
BILIRUB SERPL-MCNC: 0.2 MG/DL (ref 0.1–1.5)
PROT SERPL-MCNC: 6 G/DL (ref 6–8.2)

## 2021-08-02 PROCEDURE — 83516 IMMUNOASSAY NONANTIBODY: CPT

## 2021-08-02 PROCEDURE — 80076 HEPATIC FUNCTION PANEL: CPT

## 2021-08-02 PROCEDURE — 36415 COLL VENOUS BLD VENIPUNCTURE: CPT

## 2021-08-04 LAB — TTG IGA SER IA-ACNC: 17 U/ML (ref 0–3)

## 2021-08-28 ENCOUNTER — HOSPITAL ENCOUNTER (OUTPATIENT)
Facility: MEDICAL CENTER | Age: 61
End: 2021-08-28
Attending: FAMILY MEDICINE
Payer: COMMERCIAL

## 2021-08-28 ENCOUNTER — OFFICE VISIT (OUTPATIENT)
Dept: URGENT CARE | Facility: PHYSICIAN GROUP | Age: 61
End: 2021-08-28
Payer: COMMERCIAL

## 2021-08-28 VITALS
HEIGHT: 71 IN | RESPIRATION RATE: 16 BRPM | OXYGEN SATURATION: 97 % | DIASTOLIC BLOOD PRESSURE: 80 MMHG | WEIGHT: 208 LBS | BODY MASS INDEX: 29.12 KG/M2 | TEMPERATURE: 99.1 F | HEART RATE: 81 BPM | SYSTOLIC BLOOD PRESSURE: 120 MMHG

## 2021-08-28 DIAGNOSIS — R05.9 COUGH: ICD-10-CM

## 2021-08-28 DIAGNOSIS — R09.81 NASAL CONGESTION: ICD-10-CM

## 2021-08-28 PROCEDURE — U0005 INFEC AGEN DETEC AMPLI PROBE: HCPCS

## 2021-08-28 PROCEDURE — U0003 INFECTIOUS AGENT DETECTION BY NUCLEIC ACID (DNA OR RNA); SEVERE ACUTE RESPIRATORY SYNDROME CORONAVIRUS 2 (SARS-COV-2) (CORONAVIRUS DISEASE [COVID-19]), AMPLIFIED PROBE TECHNIQUE, MAKING USE OF HIGH THROUGHPUT TECHNOLOGIES AS DESCRIBED BY CMS-2020-01-R: HCPCS

## 2021-08-28 PROCEDURE — 99213 OFFICE O/P EST LOW 20 MIN: CPT | Performed by: FAMILY MEDICINE

## 2021-08-28 ASSESSMENT — ENCOUNTER SYMPTOMS
WEIGHT LOSS: 0
MYALGIAS: 0
VOMITING: 0
NAUSEA: 0
EYE REDNESS: 0
EYE DISCHARGE: 0

## 2021-08-28 ASSESSMENT — FIBROSIS 4 INDEX: FIB4 SCORE: 1.65

## 2021-08-28 NOTE — PROGRESS NOTES
"Subjective     Rafy Morrison is a 61 y.o. male who presents with Cough (just the cough he wants to get a covid test, x 3days )            3 days dry cough and nasal congestion.  \"Feels like a summer cold\".  No loss of taste or smell.  No fever.  No shortness of breath or wheezing.  No known C 19 exposures.  He is COVID-19 vaccinated.  No other aggravating or alleviating factors.      Review of Systems   Constitutional: Negative for malaise/fatigue and weight loss.   Eyes: Negative for discharge and redness.   Gastrointestinal: Negative for nausea and vomiting.   Musculoskeletal: Negative for joint pain and myalgias.   Skin: Negative for itching and rash.              Objective     /80 (BP Location: Right arm, Patient Position: Sitting, BP Cuff Size: Adult long)   Pulse 81   Temp 37.3 °C (99.1 °F) (Temporal)   Resp 16   Ht 1.803 m (5' 11\")   Wt 94.3 kg (208 lb)   SpO2 97%   BMI 29.01 kg/m²      Physical Exam  Constitutional:       General: He is not in acute distress.     Appearance: He is well-developed.   HENT:      Head: Normocephalic and atraumatic.      Nose: Congestion present.   Eyes:      Conjunctiva/sclera: Conjunctivae normal.   Cardiovascular:      Rate and Rhythm: Normal rate and regular rhythm.      Heart sounds: Normal heart sounds. No murmur heard.     Pulmonary:      Effort: Pulmonary effort is normal.      Breath sounds: Normal breath sounds. No wheezing.   Skin:     General: Skin is warm and dry.      Findings: No rash.   Neurological:      Mental Status: He is alert and oriented to person, place, and time.                             Assessment & Plan        1. Cough  COVID/SARS CoV-2 PCR   2. Nasal congestion  COVID/SARS CoV-2 PCR     Differential diagnosis, natural history, supportive care, and indications for immediate follow-up discussed at length.     Self isolate per CDC protocol.  Follow-up COVID-19 testing.           "

## 2021-08-29 DIAGNOSIS — R05.9 COUGH: ICD-10-CM

## 2021-08-29 DIAGNOSIS — R09.81 NASAL CONGESTION: ICD-10-CM

## 2021-09-08 ENCOUNTER — HOSPITAL ENCOUNTER (OUTPATIENT)
Dept: LAB | Facility: MEDICAL CENTER | Age: 61
End: 2021-09-08
Attending: FAMILY MEDICINE
Payer: COMMERCIAL

## 2021-09-08 LAB
25(OH)D3 SERPL-MCNC: 47 NG/ML (ref 30–100)
ALBUMIN SERPL BCP-MCNC: 4.3 G/DL (ref 3.2–4.9)
ALBUMIN/GLOB SERPL: 1.9 G/DL
ALP SERPL-CCNC: 45 U/L (ref 30–99)
ALT SERPL-CCNC: 20 U/L (ref 2–50)
ANION GAP SERPL CALC-SCNC: 11 MMOL/L (ref 7–16)
AST SERPL-CCNC: 18 U/L (ref 12–45)
BASOPHILS # BLD AUTO: 0.3 % (ref 0–1.8)
BASOPHILS # BLD: 0.02 K/UL (ref 0–0.12)
BILIRUB SERPL-MCNC: 0.5 MG/DL (ref 0.1–1.5)
BUN SERPL-MCNC: 16 MG/DL (ref 8–22)
CALCIUM SERPL-MCNC: 8.8 MG/DL (ref 8.5–10.5)
CHLORIDE SERPL-SCNC: 104 MMOL/L (ref 96–112)
CHOLEST SERPL-MCNC: 151 MG/DL (ref 100–199)
CO2 SERPL-SCNC: 24 MMOL/L (ref 20–33)
CREAT SERPL-MCNC: 1.06 MG/DL (ref 0.5–1.4)
EOSINOPHIL # BLD AUTO: 0.06 K/UL (ref 0–0.51)
EOSINOPHIL NFR BLD: 0.9 % (ref 0–6.9)
ERYTHROCYTE [DISTWIDTH] IN BLOOD BY AUTOMATED COUNT: 47 FL (ref 35.9–50)
FASTING STATUS PATIENT QL REPORTED: NORMAL
GLOBULIN SER CALC-MCNC: 2.3 G/DL (ref 1.9–3.5)
GLUCOSE SERPL-MCNC: 99 MG/DL (ref 65–99)
HCT VFR BLD AUTO: 52.4 % (ref 42–52)
HDLC SERPL-MCNC: 29 MG/DL
HGB BLD-MCNC: 17 G/DL (ref 14–18)
IMM GRANULOCYTES # BLD AUTO: 0.03 K/UL (ref 0–0.11)
IMM GRANULOCYTES NFR BLD AUTO: 0.5 % (ref 0–0.9)
LDLC SERPL CALC-MCNC: 74 MG/DL
LYMPHOCYTES # BLD AUTO: 1.3 K/UL (ref 1–4.8)
LYMPHOCYTES NFR BLD: 19.6 % (ref 22–41)
MCH RBC QN AUTO: 26.6 PG (ref 27–33)
MCHC RBC AUTO-ENTMCNC: 32.4 G/DL (ref 33.7–35.3)
MCV RBC AUTO: 81.9 FL (ref 81.4–97.8)
MONOCYTES # BLD AUTO: 0.41 K/UL (ref 0–0.85)
MONOCYTES NFR BLD AUTO: 6.2 % (ref 0–13.4)
NEUTROPHILS # BLD AUTO: 4.81 K/UL (ref 1.82–7.42)
NEUTROPHILS NFR BLD: 72.5 % (ref 44–72)
NRBC # BLD AUTO: 0 K/UL
NRBC BLD-RTO: 0 /100 WBC
PLATELET # BLD AUTO: 163 K/UL (ref 164–446)
PMV BLD AUTO: 10.6 FL (ref 9–12.9)
POTASSIUM SERPL-SCNC: 4.2 MMOL/L (ref 3.6–5.5)
PROT SERPL-MCNC: 6.6 G/DL (ref 6–8.2)
RBC # BLD AUTO: 6.4 M/UL (ref 4.7–6.1)
SODIUM SERPL-SCNC: 139 MMOL/L (ref 135–145)
TRIGL SERPL-MCNC: 242 MG/DL (ref 0–149)
WBC # BLD AUTO: 6.6 K/UL (ref 4.8–10.8)

## 2021-09-08 PROCEDURE — 80061 LIPID PANEL: CPT

## 2021-09-08 PROCEDURE — 80053 COMPREHEN METABOLIC PANEL: CPT

## 2021-09-08 PROCEDURE — 84270 ASSAY OF SEX HORMONE GLOBUL: CPT

## 2021-09-08 PROCEDURE — 82306 VITAMIN D 25 HYDROXY: CPT

## 2021-09-08 PROCEDURE — 36415 COLL VENOUS BLD VENIPUNCTURE: CPT

## 2021-09-08 PROCEDURE — 85025 COMPLETE CBC W/AUTO DIFF WBC: CPT

## 2021-09-08 PROCEDURE — 84403 ASSAY OF TOTAL TESTOSTERONE: CPT

## 2021-09-08 PROCEDURE — 84402 ASSAY OF FREE TESTOSTERONE: CPT

## 2021-09-10 LAB
SHBG SERPL-SCNC: 29 NMOL/L (ref 11–80)
TESTOST FREE MFR SERPL: 2.2 % (ref 1.6–2.9)
TESTOST FREE SERPL-MCNC: 170 PG/ML (ref 47–244)
TESTOST SERPL-MCNC: 785 NG/DL (ref 300–890)

## 2021-09-23 ENCOUNTER — HOSPITAL ENCOUNTER (OUTPATIENT)
Dept: RADIOLOGY | Facility: MEDICAL CENTER | Age: 61
End: 2021-09-23
Attending: PHYSICIAN ASSISTANT
Payer: COMMERCIAL

## 2021-09-23 PROCEDURE — 71046 X-RAY EXAM CHEST 2 VIEWS: CPT

## 2021-10-22 ENCOUNTER — HOSPITAL ENCOUNTER (OUTPATIENT)
Dept: LAB | Facility: MEDICAL CENTER | Age: 61
End: 2021-10-22
Attending: ORTHOPAEDIC SURGERY
Payer: COMMERCIAL

## 2021-10-22 LAB
BASOPHILS # BLD AUTO: 0.3 % (ref 0–1.8)
BASOPHILS # BLD: 0.02 K/UL (ref 0–0.12)
CRP SERPL HS-MCNC: 0.92 MG/DL (ref 0–0.75)
EOSINOPHIL # BLD AUTO: 0.06 K/UL (ref 0–0.51)
EOSINOPHIL NFR BLD: 0.9 % (ref 0–6.9)
ERYTHROCYTE [DISTWIDTH] IN BLOOD BY AUTOMATED COUNT: 47.3 FL (ref 35.9–50)
ERYTHROCYTE [SEDIMENTATION RATE] IN BLOOD BY WESTERGREN METHOD: 4 MM/HOUR (ref 0–20)
HCT VFR BLD AUTO: 50.3 % (ref 42–52)
HGB BLD-MCNC: 16.4 G/DL (ref 14–18)
IMM GRANULOCYTES # BLD AUTO: 0.05 K/UL (ref 0–0.11)
IMM GRANULOCYTES NFR BLD AUTO: 0.8 % (ref 0–0.9)
LYMPHOCYTES # BLD AUTO: 1.14 K/UL (ref 1–4.8)
LYMPHOCYTES NFR BLD: 17.2 % (ref 22–41)
MCH RBC QN AUTO: 26.7 PG (ref 27–33)
MCHC RBC AUTO-ENTMCNC: 32.6 G/DL (ref 33.7–35.3)
MCV RBC AUTO: 81.8 FL (ref 81.4–97.8)
MONOCYTES # BLD AUTO: 0.45 K/UL (ref 0–0.85)
MONOCYTES NFR BLD AUTO: 6.8 % (ref 0–13.4)
NEUTROPHILS # BLD AUTO: 4.9 K/UL (ref 1.82–7.42)
NEUTROPHILS NFR BLD: 74 % (ref 44–72)
NRBC # BLD AUTO: 0 K/UL
NRBC BLD-RTO: 0 /100 WBC
PLATELET # BLD AUTO: 199 K/UL (ref 164–446)
PMV BLD AUTO: 10.6 FL (ref 9–12.9)
RBC # BLD AUTO: 6.15 M/UL (ref 4.7–6.1)
WBC # BLD AUTO: 6.6 K/UL (ref 4.8–10.8)

## 2021-10-22 PROCEDURE — 85025 COMPLETE CBC W/AUTO DIFF WBC: CPT

## 2021-10-22 PROCEDURE — 86140 C-REACTIVE PROTEIN: CPT

## 2021-10-22 PROCEDURE — 82495 ASSAY OF CHROMIUM: CPT

## 2021-10-22 PROCEDURE — 36415 COLL VENOUS BLD VENIPUNCTURE: CPT

## 2021-10-22 PROCEDURE — 85652 RBC SED RATE AUTOMATED: CPT

## 2021-10-22 PROCEDURE — 83018 HEAVY METAL QUAN EACH NES: CPT

## 2021-10-26 LAB
COBALT SERPL-MCNC: <1 UG/L
CR SERPL-MCNC: <1 UG/L

## 2022-02-28 ENCOUNTER — HOSPITAL ENCOUNTER (OUTPATIENT)
Dept: RADIOLOGY | Facility: MEDICAL CENTER | Age: 62
End: 2022-02-28
Attending: NURSE PRACTITIONER
Payer: COMMERCIAL

## 2022-02-28 DIAGNOSIS — M54.16 LUMBAR RADICULOPATHY: ICD-10-CM

## 2022-02-28 DIAGNOSIS — M47.817 LUMBOSACRAL SPONDYLOSIS WITHOUT MYELOPATHY: ICD-10-CM

## 2022-02-28 PROCEDURE — 72148 MRI LUMBAR SPINE W/O DYE: CPT

## 2022-03-12 ENCOUNTER — APPOINTMENT (OUTPATIENT)
Dept: RADIOLOGY | Facility: IMAGING CENTER | Age: 62
End: 2022-03-12
Attending: NURSE PRACTITIONER
Payer: COMMERCIAL

## 2022-03-12 DIAGNOSIS — M47.817 LUMBOSACRAL SPONDYLOSIS WITHOUT MYELOPATHY: ICD-10-CM

## 2022-03-12 DIAGNOSIS — M54.16 LUMBAR RADICULOPATHY: ICD-10-CM

## 2022-03-12 PROCEDURE — 72100 X-RAY EXAM L-S SPINE 2/3 VWS: CPT | Mod: TC | Performed by: NURSE PRACTITIONER

## 2022-04-04 ENCOUNTER — HOSPITAL ENCOUNTER (OUTPATIENT)
Dept: LAB | Facility: MEDICAL CENTER | Age: 62
End: 2022-04-04
Attending: PHYSICIAN ASSISTANT
Payer: COMMERCIAL

## 2022-04-04 LAB
25(OH)D3 SERPL-MCNC: 49 NG/ML (ref 30–100)
ALBUMIN SERPL BCP-MCNC: 4.6 G/DL (ref 3.2–4.9)
ALBUMIN/GLOB SERPL: 2.7 G/DL
ALP SERPL-CCNC: 50 U/L (ref 30–99)
ALT SERPL-CCNC: 20 U/L (ref 2–50)
ANION GAP SERPL CALC-SCNC: 12 MMOL/L (ref 7–16)
AST SERPL-CCNC: 14 U/L (ref 12–45)
BASOPHILS # BLD AUTO: 0.5 % (ref 0–1.8)
BASOPHILS # BLD: 0.05 K/UL (ref 0–0.12)
BILIRUB SERPL-MCNC: 0.4 MG/DL (ref 0.1–1.5)
BUN SERPL-MCNC: 19 MG/DL (ref 8–22)
CALCIUM SERPL-MCNC: 9.3 MG/DL (ref 8.5–10.5)
CHLORIDE SERPL-SCNC: 102 MMOL/L (ref 96–112)
CHOLEST SERPL-MCNC: 190 MG/DL (ref 100–199)
CO2 SERPL-SCNC: 27 MMOL/L (ref 20–33)
CREAT SERPL-MCNC: 1.08 MG/DL (ref 0.5–1.4)
EOSINOPHIL # BLD AUTO: 0.06 K/UL (ref 0–0.51)
EOSINOPHIL NFR BLD: 0.6 % (ref 0–6.9)
ERYTHROCYTE [DISTWIDTH] IN BLOOD BY AUTOMATED COUNT: 42.4 FL (ref 35.9–50)
FASTING STATUS PATIENT QL REPORTED: NORMAL
GFR SERPLBLD CREATININE-BSD FMLA CKD-EPI: 79 ML/MIN/1.73 M 2
GLOBULIN SER CALC-MCNC: 1.7 G/DL (ref 1.9–3.5)
GLUCOSE SERPL-MCNC: 87 MG/DL (ref 65–99)
HCT VFR BLD AUTO: 54.7 % (ref 42–52)
HDLC SERPL-MCNC: 47 MG/DL
HGB BLD-MCNC: 17.7 G/DL (ref 14–18)
IMM GRANULOCYTES # BLD AUTO: 0.18 K/UL (ref 0–0.11)
IMM GRANULOCYTES NFR BLD AUTO: 1.7 % (ref 0–0.9)
LDLC SERPL CALC-MCNC: 93 MG/DL
LYMPHOCYTES # BLD AUTO: 2.68 K/UL (ref 1–4.8)
LYMPHOCYTES NFR BLD: 25.9 % (ref 22–41)
MCH RBC QN AUTO: 27.1 PG (ref 27–33)
MCHC RBC AUTO-ENTMCNC: 32.4 G/DL (ref 33.7–35.3)
MCV RBC AUTO: 83.9 FL (ref 81.4–97.8)
MONOCYTES # BLD AUTO: 0.63 K/UL (ref 0–0.85)
MONOCYTES NFR BLD AUTO: 6.1 % (ref 0–13.4)
NEUTROPHILS # BLD AUTO: 6.74 K/UL (ref 1.82–7.42)
NEUTROPHILS NFR BLD: 65.2 % (ref 44–72)
NRBC # BLD AUTO: 0 K/UL
NRBC BLD-RTO: 0 /100 WBC
PLATELET # BLD AUTO: 208 K/UL (ref 164–446)
PMV BLD AUTO: 10.1 FL (ref 9–12.9)
POTASSIUM SERPL-SCNC: 4.5 MMOL/L (ref 3.6–5.5)
PROT SERPL-MCNC: 6.3 G/DL (ref 6–8.2)
PSA SERPL-MCNC: 1.12 NG/ML (ref 0–4)
RBC # BLD AUTO: 6.52 M/UL (ref 4.7–6.1)
SODIUM SERPL-SCNC: 141 MMOL/L (ref 135–145)
T3FREE SERPL-MCNC: 2.72 PG/ML (ref 2–4.4)
T4 FREE SERPL-MCNC: 1.55 NG/DL (ref 0.93–1.7)
TRIGL SERPL-MCNC: 250 MG/DL (ref 0–149)
TSH SERPL DL<=0.005 MIU/L-ACNC: 0.98 UIU/ML (ref 0.38–5.33)
WBC # BLD AUTO: 10.3 K/UL (ref 4.8–10.8)

## 2022-04-04 PROCEDURE — 36415 COLL VENOUS BLD VENIPUNCTURE: CPT

## 2022-04-04 PROCEDURE — 84402 ASSAY OF FREE TESTOSTERONE: CPT

## 2022-04-04 PROCEDURE — 82306 VITAMIN D 25 HYDROXY: CPT

## 2022-04-04 PROCEDURE — 84439 ASSAY OF FREE THYROXINE: CPT

## 2022-04-04 PROCEDURE — 80061 LIPID PANEL: CPT

## 2022-04-04 PROCEDURE — 85025 COMPLETE CBC W/AUTO DIFF WBC: CPT

## 2022-04-04 PROCEDURE — 80053 COMPREHEN METABOLIC PANEL: CPT

## 2022-04-04 PROCEDURE — 84403 ASSAY OF TOTAL TESTOSTERONE: CPT

## 2022-04-04 PROCEDURE — 84481 FREE ASSAY (FT-3): CPT

## 2022-04-04 PROCEDURE — 84153 ASSAY OF PSA TOTAL: CPT

## 2022-04-04 PROCEDURE — 83036 HEMOGLOBIN GLYCOSYLATED A1C: CPT

## 2022-04-04 PROCEDURE — 84443 ASSAY THYROID STIM HORMONE: CPT

## 2022-04-04 PROCEDURE — 84270 ASSAY OF SEX HORMONE GLOBUL: CPT

## 2022-04-05 LAB
EST. AVERAGE GLUCOSE BLD GHB EST-MCNC: 126 MG/DL
HBA1C MFR BLD: 6 % (ref 4–5.6)

## 2022-04-07 LAB
SHBG SERPL-SCNC: 34 NMOL/L (ref 19–76)
TESTOST FREE MFR SERPL: 1.8 % (ref 1.6–2.9)
TESTOST FREE SERPL-MCNC: 77 PG/ML (ref 47–244)
TESTOST SERPL-MCNC: 420 NG/DL (ref 300–890)

## 2022-11-02 ENCOUNTER — APPOINTMENT (OUTPATIENT)
Dept: RADIOLOGY | Facility: MEDICAL CENTER | Age: 62
End: 2022-11-02
Attending: EMERGENCY MEDICINE
Payer: COMMERCIAL

## 2022-11-02 ENCOUNTER — HOSPITAL ENCOUNTER (EMERGENCY)
Facility: MEDICAL CENTER | Age: 62
End: 2022-11-02
Attending: EMERGENCY MEDICINE
Payer: COMMERCIAL

## 2022-11-02 ENCOUNTER — APPOINTMENT (OUTPATIENT)
Dept: RADIOLOGY | Facility: MEDICAL CENTER | Age: 62
End: 2022-11-02
Payer: COMMERCIAL

## 2022-11-02 VITALS
RESPIRATION RATE: 18 BRPM | SYSTOLIC BLOOD PRESSURE: 132 MMHG | DIASTOLIC BLOOD PRESSURE: 73 MMHG | BODY MASS INDEX: 30.8 KG/M2 | HEART RATE: 99 BPM | TEMPERATURE: 97.4 F | HEIGHT: 71 IN | OXYGEN SATURATION: 90 % | WEIGHT: 220 LBS
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
## 2022-11-02 DIAGNOSIS — T07.XXXA MULTIPLE CONTUSIONS: Primary | ICD-10-CM

## 2022-11-02 LAB
ABO + RH BLD: NORMAL
ABO GROUP BLD: NORMAL
ALBUMIN SERPL BCP-MCNC: 4 G/DL (ref 3.2–4.9)
ALBUMIN/GLOB SERPL: 2 G/DL
ALP SERPL-CCNC: 45 U/L (ref 30–99)
ALT SERPL-CCNC: 18 U/L (ref 2–50)
ANION GAP SERPL CALC-SCNC: 10 MMOL/L (ref 7–16)
AST SERPL-CCNC: 24 U/L (ref 12–45)
BILIRUB SERPL-MCNC: 0.6 MG/DL (ref 0.1–1.5)
BLD GP AB SCN SERPL QL: NORMAL
BUN SERPL-MCNC: 16 MG/DL (ref 8–22)
CALCIUM SERPL-MCNC: 9 MG/DL (ref 8.5–10.5)
CHLORIDE SERPL-SCNC: 100 MMOL/L (ref 96–112)
CO2 SERPL-SCNC: 25 MMOL/L (ref 20–33)
CREAT SERPL-MCNC: 1.3 MG/DL (ref 0.5–1.4)
EKG IMPRESSION: NORMAL
ERYTHROCYTE [DISTWIDTH] IN BLOOD BY AUTOMATED COUNT: 44 FL (ref 35.9–50)
ETHANOL BLD-MCNC: <10.1 MG/DL
GFR SERPLBLD CREATININE-BSD FMLA CKD-EPI: 63 ML/MIN/1.73 M 2
GLOBULIN SER CALC-MCNC: 2 G/DL (ref 1.9–3.5)
GLUCOSE SERPL-MCNC: 161 MG/DL (ref 65–99)
HCT VFR BLD AUTO: 53.2 % (ref 42–52)
HGB BLD-MCNC: 17.7 G/DL (ref 14–18)
MCH RBC QN AUTO: 27.6 PG (ref 27–33)
MCHC RBC AUTO-ENTMCNC: 33.3 G/DL (ref 33.7–35.3)
MCV RBC AUTO: 82.9 FL (ref 81.4–97.8)
PLATELET # BLD AUTO: 166 K/UL (ref 164–446)
PMV BLD AUTO: 9.9 FL (ref 9–12.9)
POTASSIUM SERPL-SCNC: 4.3 MMOL/L (ref 3.6–5.5)
PROT SERPL-MCNC: 6 G/DL (ref 6–8.2)
RBC # BLD AUTO: 6.42 M/UL (ref 4.7–6.1)
RH BLD: NORMAL
SODIUM SERPL-SCNC: 135 MMOL/L (ref 135–145)
WBC # BLD AUTO: 8.9 K/UL (ref 4.8–10.8)

## 2022-11-02 PROCEDURE — 72128 CT CHEST SPINE W/O DYE: CPT

## 2022-11-02 PROCEDURE — 71045 X-RAY EXAM CHEST 1 VIEW: CPT

## 2022-11-02 PROCEDURE — 93005 ELECTROCARDIOGRAM TRACING: CPT | Performed by: EMERGENCY MEDICINE

## 2022-11-02 PROCEDURE — 86900 BLOOD TYPING SEROLOGIC ABO: CPT

## 2022-11-02 PROCEDURE — 99285 EMERGENCY DEPT VISIT HI MDM: CPT

## 2022-11-02 PROCEDURE — 700117 HCHG RX CONTRAST REV CODE 255: Performed by: EMERGENCY MEDICINE

## 2022-11-02 PROCEDURE — 70450 CT HEAD/BRAIN W/O DYE: CPT

## 2022-11-02 PROCEDURE — 71260 CT THORAX DX C+: CPT

## 2022-11-02 PROCEDURE — 80053 COMPREHEN METABOLIC PANEL: CPT

## 2022-11-02 PROCEDURE — 86901 BLOOD TYPING SEROLOGIC RH(D): CPT

## 2022-11-02 PROCEDURE — 72131 CT LUMBAR SPINE W/O DYE: CPT

## 2022-11-02 PROCEDURE — 305948 HCHG GREEN TRAUMA ACT PRE-NOTIFY NO CC

## 2022-11-02 PROCEDURE — 73030 X-RAY EXAM OF SHOULDER: CPT | Mod: LT

## 2022-11-02 PROCEDURE — 82077 ASSAY SPEC XCP UR&BREATH IA: CPT

## 2022-11-02 PROCEDURE — 86850 RBC ANTIBODY SCREEN: CPT

## 2022-11-02 PROCEDURE — 72170 X-RAY EXAM OF PELVIS: CPT

## 2022-11-02 PROCEDURE — 36415 COLL VENOUS BLD VENIPUNCTURE: CPT

## 2022-11-02 PROCEDURE — 700111 HCHG RX REV CODE 636 W/ 250 OVERRIDE (IP): Performed by: EMERGENCY MEDICINE

## 2022-11-02 PROCEDURE — 96374 THER/PROPH/DIAG INJ IV PUSH: CPT

## 2022-11-02 PROCEDURE — 700111 HCHG RX REV CODE 636 W/ 250 OVERRIDE (IP)

## 2022-11-02 PROCEDURE — 96376 TX/PRO/DX INJ SAME DRUG ADON: CPT

## 2022-11-02 PROCEDURE — 72125 CT NECK SPINE W/O DYE: CPT

## 2022-11-02 PROCEDURE — 73564 X-RAY EXAM KNEE 4 OR MORE: CPT | Mod: RT

## 2022-11-02 PROCEDURE — 85027 COMPLETE CBC AUTOMATED: CPT

## 2022-11-02 RX ORDER — MORPHINE SULFATE 4 MG/ML
4 INJECTION INTRAVENOUS ONCE
Status: COMPLETED | OUTPATIENT
Start: 2022-11-02 | End: 2022-11-02

## 2022-11-02 RX ORDER — MORPHINE SULFATE 4 MG/ML
INJECTION INTRAVENOUS
Status: COMPLETED
Start: 2022-11-02 | End: 2022-11-02

## 2022-11-02 RX ADMIN — IOHEXOL 100 ML: 350 INJECTION, SOLUTION INTRAVENOUS at 05:03

## 2022-11-02 RX ADMIN — MORPHINE SULFATE 4 MG: 4 INJECTION, SOLUTION INTRAMUSCULAR; INTRAVENOUS at 07:30

## 2022-11-02 RX ADMIN — MORPHINE SULFATE 4 MG: 4 INJECTION INTRAVENOUS at 05:16

## 2022-11-02 RX ADMIN — MORPHINE SULFATE 4 MG: 4 INJECTION INTRAVENOUS at 07:20

## 2022-11-02 ASSESSMENT — PAIN DESCRIPTION - PAIN TYPE: TYPE: ACUTE PAIN
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
## 2022-11-21 ENCOUNTER — OFFICE VISIT (OUTPATIENT)
Dept: URGENT CARE | Facility: PHYSICIAN GROUP | Age: 62
End: 2022-11-21
Payer: COMMERCIAL

## 2022-11-21 VITALS
OXYGEN SATURATION: 97 % | RESPIRATION RATE: 20 BRPM | TEMPERATURE: 97.5 F | BODY MASS INDEX: 30.1 KG/M2 | WEIGHT: 215 LBS | SYSTOLIC BLOOD PRESSURE: 138 MMHG | DIASTOLIC BLOOD PRESSURE: 74 MMHG | HEIGHT: 71 IN | HEART RATE: 72 BPM
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
## 2022-11-21 DIAGNOSIS — M54.6 ACUTE RIGHT-SIDED THORACIC BACK PAIN: ICD-10-CM

## 2022-11-21 DIAGNOSIS — M62.838 MUSCLE SPASM: ICD-10-CM

## 2022-11-21 PROCEDURE — 99213 OFFICE O/P EST LOW 20 MIN: CPT | Performed by: NURSE PRACTITIONER

## 2022-11-21 RX ORDER — BACLOFEN 10 MG/1
10 TABLET ORAL 3 TIMES DAILY
Qty: 21 TABLET | Refills: 0 | Status: SHIPPED | OUTPATIENT
Start: 2022-11-21 | End: 2022-11-28

## 2022-11-21 RX ORDER — FENOFIBRATE 160 MG/1
TABLET ORAL
COMMUNITY
Start: 2022-11-15

## 2022-11-21 RX ORDER — METHYLPREDNISOLONE 4 MG/1
TABLET ORAL
Qty: 21 TABLET | Refills: 0 | Status: SHIPPED | OUTPATIENT
Start: 2022-11-21

## 2022-11-21 RX ORDER — KETOROLAC TROMETHAMINE 30 MG/ML
30 INJECTION, SOLUTION INTRAMUSCULAR; INTRAVENOUS ONCE
Status: COMPLETED | OUTPATIENT
Start: 2022-11-21 | End: 2022-11-21

## 2022-11-21 RX ADMIN — KETOROLAC TROMETHAMINE 30 MG: 30 INJECTION, SOLUTION INTRAMUSCULAR; INTRAVENOUS at 15:19

## 2022-11-21 ASSESSMENT — FIBROSIS 4 INDEX: FIB4 SCORE: 2.04
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
## 2022-12-02 ENCOUNTER — HOSPITAL ENCOUNTER (OUTPATIENT)
Dept: LAB | Facility: MEDICAL CENTER | Age: 62
End: 2022-12-02
Attending: FAMILY MEDICINE
Payer: COMMERCIAL

## 2022-12-02 LAB
25(OH)D3 SERPL-MCNC: 60 NG/ML (ref 30–100)
ALBUMIN SERPL BCP-MCNC: 4.6 G/DL (ref 3.2–4.9)
ALBUMIN/GLOB SERPL: 2.6 G/DL
ALP SERPL-CCNC: 61 U/L (ref 30–99)
ALT SERPL-CCNC: 24 U/L (ref 2–50)
ANION GAP SERPL CALC-SCNC: 12 MMOL/L (ref 7–16)
AST SERPL-CCNC: 20 U/L (ref 12–45)
BASOPHILS # BLD AUTO: 0.4 % (ref 0–1.8)
BASOPHILS # BLD: 0.04 K/UL (ref 0–0.12)
BILIRUB SERPL-MCNC: 0.4 MG/DL (ref 0.1–1.5)
BUN SERPL-MCNC: 14 MG/DL (ref 8–22)
CALCIUM SERPL-MCNC: 8.9 MG/DL (ref 8.5–10.5)
CHLORIDE SERPL-SCNC: 106 MMOL/L (ref 96–112)
CHOLEST SERPL-MCNC: 172 MG/DL (ref 100–199)
CO2 SERPL-SCNC: 21 MMOL/L (ref 20–33)
CREAT SERPL-MCNC: 1.02 MG/DL (ref 0.5–1.4)
EOSINOPHIL # BLD AUTO: 0.07 K/UL (ref 0–0.51)
EOSINOPHIL NFR BLD: 0.7 % (ref 0–6.9)
ERYTHROCYTE [DISTWIDTH] IN BLOOD BY AUTOMATED COUNT: 41.7 FL (ref 35.9–50)
FASTING STATUS PATIENT QL REPORTED: NORMAL
GFR SERPLBLD CREATININE-BSD FMLA CKD-EPI: 84 ML/MIN/1.73 M 2
GLOBULIN SER CALC-MCNC: 1.8 G/DL (ref 1.9–3.5)
GLUCOSE SERPL-MCNC: 108 MG/DL (ref 65–99)
HCT VFR BLD AUTO: 54.2 % (ref 42–52)
HDLC SERPL-MCNC: 33 MG/DL
HGB BLD-MCNC: 18.2 G/DL (ref 14–18)
IMM GRANULOCYTES # BLD AUTO: 0.12 K/UL (ref 0–0.11)
IMM GRANULOCYTES NFR BLD AUTO: 1.2 % (ref 0–0.9)
LDLC SERPL CALC-MCNC: 87 MG/DL
LYMPHOCYTES # BLD AUTO: 1.99 K/UL (ref 1–4.8)
LYMPHOCYTES NFR BLD: 20.5 % (ref 22–41)
MCH RBC QN AUTO: 27.3 PG (ref 27–33)
MCHC RBC AUTO-ENTMCNC: 33.6 G/DL (ref 33.7–35.3)
MCV RBC AUTO: 81.3 FL (ref 81.4–97.8)
MONOCYTES # BLD AUTO: 0.56 K/UL (ref 0–0.85)
MONOCYTES NFR BLD AUTO: 5.8 % (ref 0–13.4)
NEUTROPHILS # BLD AUTO: 6.94 K/UL (ref 1.82–7.42)
NEUTROPHILS NFR BLD: 71.4 % (ref 44–72)
NRBC # BLD AUTO: 0 K/UL
NRBC BLD-RTO: 0 /100 WBC
PLATELET # BLD AUTO: 177 K/UL (ref 164–446)
PMV BLD AUTO: 10.4 FL (ref 9–12.9)
POTASSIUM SERPL-SCNC: 4.4 MMOL/L (ref 3.6–5.5)
PROT SERPL-MCNC: 6.4 G/DL (ref 6–8.2)
RBC # BLD AUTO: 6.67 M/UL (ref 4.7–6.1)
SODIUM SERPL-SCNC: 139 MMOL/L (ref 135–145)
T3FREE SERPL-MCNC: 3.31 PG/ML (ref 2–4.4)
T4 FREE SERPL-MCNC: 1.38 NG/DL (ref 0.93–1.7)
TRIGL SERPL-MCNC: 262 MG/DL (ref 0–149)
TSH SERPL DL<=0.005 MIU/L-ACNC: 0.83 UIU/ML (ref 0.38–5.33)
WBC # BLD AUTO: 9.7 K/UL (ref 4.8–10.8)

## 2022-12-02 PROCEDURE — 80053 COMPREHEN METABOLIC PANEL: CPT

## 2022-12-02 PROCEDURE — 85025 COMPLETE CBC W/AUTO DIFF WBC: CPT

## 2022-12-02 PROCEDURE — 82306 VITAMIN D 25 HYDROXY: CPT

## 2022-12-02 PROCEDURE — 84403 ASSAY OF TOTAL TESTOSTERONE: CPT

## 2022-12-02 PROCEDURE — 84439 ASSAY OF FREE THYROXINE: CPT

## 2022-12-02 PROCEDURE — 84402 ASSAY OF FREE TESTOSTERONE: CPT

## 2022-12-02 PROCEDURE — 84443 ASSAY THYROID STIM HORMONE: CPT

## 2022-12-02 PROCEDURE — 84481 FREE ASSAY (FT-3): CPT

## 2022-12-02 PROCEDURE — 80061 LIPID PANEL: CPT

## 2022-12-02 PROCEDURE — 36415 COLL VENOUS BLD VENIPUNCTURE: CPT

## 2022-12-02 PROCEDURE — 84270 ASSAY OF SEX HORMONE GLOBUL: CPT

## 2022-12-02 PROCEDURE — 86800 THYROGLOBULIN ANTIBODY: CPT

## 2022-12-05 LAB
SHBG SERPL-SCNC: 24 NMOL/L (ref 19–76)
TESTOST FREE MFR SERPL: 2.1 % (ref 1.6–2.9)
TESTOST FREE SERPL-MCNC: 47 PG/ML (ref 47–244)
TESTOST SERPL-MCNC: 229 NG/DL (ref 300–720)
THYROGLOB AB SERPL-ACNC: <0.9 IU/ML (ref 0–4)

## (undated) DEVICE — GLOVE BIOGEL INDICATOR SZ 8.5 SURGICAL PF LTX - (50/BX 4BX/CA)

## (undated) DEVICE — LACTATED RINGERS INJ 1000 ML - (14EA/CA 60CA/PF)

## (undated) DEVICE — SUTURE GENERAL

## (undated) DEVICE — STAPLER SKIN DISP - (6/BX 10BX/CA) VISISTAT

## (undated) DEVICE — HEAD HOLDER JUNIOR/ADULT

## (undated) DEVICE — GLOVE, LITE (PAIR)

## (undated) DEVICE — PACK TOTAL KNEE  (1/CA)

## (undated) DEVICE — TIP INTPLS HFLO ML ORFC BTRY - (12/CS)  FOR SURGILAV

## (undated) DEVICE — MASK ANESTHESIA ADULT  - (100/CA)

## (undated) DEVICE — SUTURE 2-0 VICRYL PLUS CT-1 36 (36PK/BX)"

## (undated) DEVICE — SUCTION INSTRUMENT YANKAUER BULBOUS TIP W/O VENT (50EA/CA)

## (undated) DEVICE — BLADE 90X18X1.27MM SAW SAGITTAL

## (undated) DEVICE — PADDING CAST 6 IN STERILE - 6 X 4 YDS (24/CA)

## (undated) DEVICE — SODIUM CHL. IRRIGATION 0.9% 3000ML (4EA/CA 65CA/PF)

## (undated) DEVICE — MIXER BONE CEMENT REVOLUTION - W/FEMORAL PRESSURIZER (6/CA)

## (undated) DEVICE — TUBE CONNECTING SUCTION - CLEAR PLASTIC STERILE 72 IN (50EA/CA)

## (undated) DEVICE — IMPLANT KIT

## (undated) DEVICE — ELECTRODE DUAL RETURN W/ CORD - (50/PK)

## (undated) DEVICE — STOCKINET TUBULAR 6IN STERILE - 6 X 48YDS (25/CA)

## (undated) DEVICE — KIT ANESTHESIA W/CIRCUIT & 3/LT BAG W/FILTER (20EA/CA)

## (undated) DEVICE — Device

## (undated) DEVICE — GOWN WARMING STANDARD FLEX - (30/CA)

## (undated) DEVICE — PROTECTOR ULNA NERVE - (36PR/CA)

## (undated) DEVICE — HUMID-VENT HEAT AND MOISTURE EXCHANGE- (50/BX)

## (undated) DEVICE — SODIUM CHL IRRIGATION 0.9% 1000ML (12EA/CA)

## (undated) DEVICE — DRESSING AQUACEL AG ADVANTAGE 3.5 X 10" (10EA/BX)"

## (undated) DEVICE — NEPTUNE 4 PORT MANIFOLD - (20/PK)

## (undated) DEVICE — ELECTRODE 850 FOAM ADHESIVE - HYDROGEL RADIOTRNSPRNT (50/PK)

## (undated) DEVICE — LENS/HOOD FOR SPACESUIT - (32/PK) PEEL AWAY FACE

## (undated) DEVICE — SENSOR SPO2 NEO LNCS ADHESIVE (20/BX) SEE USER NOTES

## (undated) DEVICE — WATER IRRIGATION STERILE 1000ML (12EA/CA)

## (undated) DEVICE — CHLORAPREP 26 ML APPLICATOR - ORANGE TINT(25/CA)

## (undated) DEVICE — BAG SPONGE COUNT 10.25 X 32 - BLUE (250/CA)

## (undated) DEVICE — CANISTER SUCTION RIGID RED 1500CC (40EA/CA)

## (undated) DEVICE — HANDPIECE 10FT INTPLS SCT PLS IRRIGATION HAND CONTROL SET (6/PK)

## (undated) DEVICE — GLOVE SURGICAL PROTEXIS PI 8.0 LF - (50PR/BX)